# Patient Record
Sex: MALE | Race: WHITE | NOT HISPANIC OR LATINO | Employment: OTHER | URBAN - METROPOLITAN AREA
[De-identification: names, ages, dates, MRNs, and addresses within clinical notes are randomized per-mention and may not be internally consistent; named-entity substitution may affect disease eponyms.]

---

## 2023-02-03 PROBLEM — G89.29 CHRONIC ARTHRALGIAS OF KNEES AND HIPS: Status: ACTIVE | Noted: 2023-02-03

## 2023-02-03 PROBLEM — M25.561 CHRONIC ARTHRALGIAS OF KNEES AND HIPS: Status: ACTIVE | Noted: 2023-02-03

## 2023-02-03 PROBLEM — R79.89 LOW SERUM VITAMIN D: Status: ACTIVE | Noted: 2023-02-03

## 2023-02-03 PROBLEM — M25.551 CHRONIC ARTHRALGIAS OF KNEES AND HIPS: Status: ACTIVE | Noted: 2023-02-03

## 2023-02-03 PROBLEM — I10 ESSENTIAL HYPERTENSION: Status: ACTIVE | Noted: 2023-02-03

## 2023-02-03 PROBLEM — D64.9 ANEMIA: Status: ACTIVE | Noted: 2023-02-03

## 2023-02-03 PROBLEM — M25.552 CHRONIC ARTHRALGIAS OF KNEES AND HIPS: Status: ACTIVE | Noted: 2023-02-03

## 2023-02-03 PROBLEM — E80.6 HYPERBILIRUBINEMIA: Status: ACTIVE | Noted: 2023-02-03

## 2023-02-03 PROBLEM — M25.562 CHRONIC ARTHRALGIAS OF KNEES AND HIPS: Status: ACTIVE | Noted: 2023-02-03

## 2023-02-03 RX ORDER — CHOLECALCIFEROL (VITAMIN D3) 125 MCG
CAPSULE ORAL
COMMUNITY

## 2023-02-03 RX ORDER — LISINOPRIL 10 MG/1
30 TABLET ORAL DAILY
COMMUNITY
Start: 2022-11-01 | End: 2023-08-23 | Stop reason: ALTCHOICE

## 2023-03-17 ENCOUNTER — OFFICE VISIT (OUTPATIENT)
Dept: PRIMARY CARE | Facility: CLINIC | Age: 63
End: 2023-03-17
Payer: COMMERCIAL

## 2023-03-17 VITALS
TEMPERATURE: 97.3 F | HEIGHT: 66 IN | OXYGEN SATURATION: 99 % | RESPIRATION RATE: 16 BRPM | SYSTOLIC BLOOD PRESSURE: 216 MMHG | HEART RATE: 87 BPM | DIASTOLIC BLOOD PRESSURE: 80 MMHG | WEIGHT: 185 LBS | BODY MASS INDEX: 29.73 KG/M2

## 2023-03-17 DIAGNOSIS — E80.6 HYPERBILIRUBINEMIA: ICD-10-CM

## 2023-03-17 DIAGNOSIS — R80.9 ALBUMINURIA: ICD-10-CM

## 2023-03-17 DIAGNOSIS — E78.5 HYPERLIPIDEMIA LDL GOAL <100: ICD-10-CM

## 2023-03-17 DIAGNOSIS — R79.89 LOW SERUM VITAMIN D: ICD-10-CM

## 2023-03-17 DIAGNOSIS — R73.9 HYPERGLYCEMIA: ICD-10-CM

## 2023-03-17 DIAGNOSIS — I10 ESSENTIAL HYPERTENSION: Primary | ICD-10-CM

## 2023-03-17 PROCEDURE — 3077F SYST BP >= 140 MM HG: CPT | Performed by: INTERNAL MEDICINE

## 2023-03-17 PROCEDURE — 3079F DIAST BP 80-89 MM HG: CPT | Performed by: INTERNAL MEDICINE

## 2023-03-17 PROCEDURE — 1036F TOBACCO NON-USER: CPT | Performed by: INTERNAL MEDICINE

## 2023-03-17 PROCEDURE — 99214 OFFICE O/P EST MOD 30 MIN: CPT | Performed by: INTERNAL MEDICINE

## 2023-03-17 RX ORDER — AMLODIPINE BESYLATE 5 MG/1
5 TABLET ORAL DAILY
Qty: 30 TABLET | Refills: 5 | Status: SHIPPED | OUTPATIENT
Start: 2023-03-17 | End: 2023-04-13

## 2023-03-17 SDOH — ECONOMIC STABILITY: FOOD INSECURITY: WITHIN THE PAST 12 MONTHS, YOU WORRIED THAT YOUR FOOD WOULD RUN OUT BEFORE YOU GOT MONEY TO BUY MORE.: NEVER TRUE

## 2023-03-17 SDOH — HEALTH STABILITY: PHYSICAL HEALTH: ON AVERAGE, HOW MANY MINUTES DO YOU ENGAGE IN EXERCISE AT THIS LEVEL?: 90 MIN

## 2023-03-17 SDOH — HEALTH STABILITY: PHYSICAL HEALTH: ON AVERAGE, HOW MANY DAYS PER WEEK DO YOU ENGAGE IN MODERATE TO STRENUOUS EXERCISE (LIKE A BRISK WALK)?: 7 DAYS

## 2023-03-17 SDOH — ECONOMIC STABILITY: FOOD INSECURITY: WITHIN THE PAST 12 MONTHS, THE FOOD YOU BOUGHT JUST DIDN'T LAST AND YOU DIDN'T HAVE MONEY TO GET MORE.: NEVER TRUE

## 2023-03-17 ASSESSMENT — ENCOUNTER SYMPTOMS: HYPERTENSION: 1

## 2023-03-17 ASSESSMENT — PATIENT HEALTH QUESTIONNAIRE - PHQ9
SUM OF ALL RESPONSES TO PHQ9 QUESTIONS 1 & 2: 0
2. FEELING DOWN, DEPRESSED OR HOPELESS: NOT AT ALL
1. LITTLE INTEREST OR PLEASURE IN DOING THINGS: NOT AT ALL

## 2023-03-17 ASSESSMENT — LIFESTYLE VARIABLES
HOW OFTEN DO YOU HAVE A DRINK CONTAINING ALCOHOL: NEVER
HOW OFTEN DO YOU HAVE SIX OR MORE DRINKS ON ONE OCCASION: NEVER
AUDIT-C TOTAL SCORE: 0
HOW MANY STANDARD DRINKS CONTAINING ALCOHOL DO YOU HAVE ON A TYPICAL DAY: PATIENT DOES NOT DRINK
SKIP TO QUESTIONS 9-10: 1

## 2023-03-17 ASSESSMENT — PAIN SCALES - GENERAL: PAINLEVEL: 0-NO PAIN

## 2023-03-17 NOTE — ASSESSMENT & PLAN NOTE
BP remains elevated, he has significant albuminuria, continue the lisinopril, he has no abdominal bruits.  Will add amlodipine to current regimen. Check a renal vascular study and an ECHO cardiogram. Patient does have a family history of HTN.  Recheck BP in the office in 3 to 4 weeks. Recheck labs in a month (3 month follow up).

## 2023-03-17 NOTE — PROGRESS NOTES
Chief Complaint/HPI:  HTN: Patient has been taking lisinopril 30 mg daily since 12/2022, he remains active. He had been eating a high protein diet prior to last labs, he has reduced the amount of protein in the diet now. He otherwise feels well. He avoids added salt in the diet. He does not consume caffeine. He denies any chest discomfort or shortness of breath, he coaches tennis.    Hyperglycemia: patient feels that he was not fasting at the time of the last visit    Vitamin d deficiency: he takes vitamin d daily    ROS otherwise negative aside from what was mentioned above in HPI.      Patient Active Problem List   Diagnosis    Anemia    Chronic arthralgias of knees and hips    Essential hypertension    Hyperbilirubinemia    Low serum vitamin D    Hyperlipidemia LDL goal <100    Albuminuria    Hyperglycemia         Past Medical History:   Diagnosis Date    Anemia 02/03/2023    Chronic arthralgias of knees and hips 02/03/2023    Essential hypertension 02/03/2023    Hyperbilirubinemia 02/03/2023    Hypertension     Low serum vitamin D 02/03/2023     Past Surgical History:   Procedure Laterality Date    OTHER SURGICAL HISTORY  11/24/2021    Tonsillectomy with adenoidectomy    OTHER SURGICAL HISTORY  11/24/2021    [place] tooth extraction     Social History     Social History Narrative    Not on file         ALLERGIES  Patient has no known allergies.      MEDICATIONS  Current Outpatient Medications on File Prior to Visit   Medication Sig Dispense Refill    cholecalciferol (Vitamin D-3) 125 MCG (5000 UT) capsule Vitamin D3 125 MCG (5000 UT) Oral Capsule   Refills: 0       Active      lisinopril 10 mg tablet Take 3 tablets (30 mg) by mouth once daily.      multivit-minerals/FA/lycopene (ONE DAILY FOR MEN ORAL)        No current facility-administered medications on file prior to visit.         PHYSICAL EXAM  BP (!) 216/80 (BP Location: Left arm, Patient Position: Sitting, BP Cuff Size: Large adult)   Pulse 87   Temp  "36.3 °C (97.3 °F) (Temporal)   Resp 16   Ht 1.676 m (5' 6\")   Wt 83.9 kg (185 lb)   SpO2 99%   BMI 29.86 kg/m²   Body mass index is 29.86 kg/m².  Gen: Alert, NAD, wearing a mask  HEENT:  PERRLA, EOMI, conjunctiva and sclera normal in appearance,  neck is supple without masses, bruits or thyromegaly  Respiratory:  Lungs CTAB  Cardiovascular:  Heart RRR. No M/R/G  Neuro:  Gross motor and sensory intact  Abdomen: no abdominal bruits are noted       Component Ref Range & Units 2 mo ago 9 mo ago 1 yr ago   Glucose 74 - 99 mg/dL 113 High   95   Sodium 136 - 145 mmol/L 141  137   Potassium 3.5 - 5.3 mmol/L 4.4  3.9   Chloride 98 - 107 mmol/L 105  101   Bicarbonate 21 - 32 mmol/L 25  25   Anion Gap 10 - 20 mmol/L 15  15   Urea Nitrogen 6 - 23 mg/dL 34 High   28 High    Creatinine 0.50 - 1.30 mg/dL 1.27  1.20   GFR MALE >90 mL/min/1.73m2 64     Comment:  CALCULATIONS OF ESTIMATED GFR ARE PERFORMED   USING THE 2021 CKD-EPI STUDY REFIT EQUATION   WITHOUT THE RACE VARIABLE FOR THE IDMS-TRACEABLE   CREATININE METHODS.    https://jasn.asnjournals.org/content/early/2021/09/22/[email or web address]   Calcium 8.6 - 10.3 mg/dL 9.5  9.4   Albumin 3.4 - 5.0 g/dL 3.9 3.4 4.0   Alkaline Phosphatase 33 - 136 U/L 75 67 62   Total Protein 6.4 - 8.2 g/dL 7.3 6.6 7.5   AST 9 - 39 U/L 38 28 29   Total Bilirubin 0.0 - 1.2 mg/dL 0.6 0.6 1.5 High    ALT (SGPT) 10 - 52 U/L 19 21 CM 19 CM   Comment:  Patients treated with Sulfasalazine may generate     Cholesterol 0 - 199 mg/dL 216 High  154 CM   Comment: .      AGE      DESIRABLE   BORDERLINE HIGH   HIGH     0-19 Y     0 - 169       170 - 199     >/= 200    20-24 Y     0 - 189       190 - 224     >/= 225        >24 Y     0 - 199       200 - 239     >/= 240   **All ranges are based on fasting samples. Specific   therapeutic targets will vary based on patient-specific   cardiac risk.  .   Pediatric guidelines reference:Pediatrics 2011, 128(S5).   Adult guidelines reference: NCEP ATPIII Guidelines,    " LESLEY 2001, 258:2486-97  .   Venipuncture immediately after or during the   administration of Metamizole may lead to falsely   low results. Testing should be performed immediately   prior to Metamizole dosing.   HDL mg/dL 55.9 48.6 CM   Comment: .      AGE      VERY LOW   LOW     NORMAL    HIGH       0-19 Y       < 35   < 40     40-45     ----    20-24 Y       ----   < 40       >45     ----      >24 Y       ----   < 40     40-60      >60  .   Cholesterol/HDL Ratio  3.9 3.2 CM   Comment: REF VALUES  DESIRABLE  < 3.4  HIGH RISK  > 5.0   LDL 0 - 99 mg/dL 143 High  92 CM   Comment: .                           NEAR      BORD      AGE      DESIRABLE  OPTIMAL    HIGH     HIGH     VERY HIGH     0-19 Y     0 - 109     ---    110-129   >/= 130     ----    20-24 Y     0 - 119     ---    120-159   >/= 160     ----      >24 Y     0 -  99   100-129  130-159   160-189     >/=190  .   VLDL 0 - 40 mg/dL 17 14   Triglycerides 0 - 149 mg/dL 85 68 CM   Comment: .      AGE      DESIRABLE   BORDERLINE HIGH   HIGH     VERY HIGH   0 D-90 D    19 - 174         ----         ----        ----  91 D- 9 Y     0 -  74        75 -  99     >/= 100      ----    10-19 Y     0 -  89        90 - 129     >/= 130      ----    20-24 Y     0 - 114       115 - 149     >/= 150      ----        >24 Y     0 - 149       150 - 199    200- 499    >/= 500  .   Venipuncture immediately after or during the   administration of Metamizole may lead to falsely   low results. Testing should be performed immediately   prior to Metamizole dosing.     Component Ref Range & Units 2 mo ago   ALBUMIN (MG/L) IN URINE Not Established mg/L 534.2   Albumin/Creatine Ratio 0.0 - 30.0 ug/mg crt 2,684.4 High    Creatinine, Urine 20.0 - 370.0 mg/dL 19.9 Low        ASSESSMENT/PLAN  Problem List Items Addressed This Visit          Circulatory    Essential hypertension - Primary    Current Assessment & Plan     BP remains elevated, he has significant albuminuria, continue the lisinopril,  he has no abdominal bruits.  Will add amlodipine to current regimen. Check a renal vascular study and an ECHO cardiogram. Patient does have a family history of HTN.  Recheck BP in the office in 3 to 4 weeks. Recheck labs in a month (3 month follow up).            Other    Hyperbilirubinemia    Low serum vitamin D    Current Assessment & Plan     Continue vitamin d and continue to monitor         Hyperlipidemia LDL goal <100    Current Assessment & Plan     Recheck labs in a month, will treat if needed         Albuminuria    Current Assessment & Plan     See above plan for hypertension         Hyperglycemia    Current Assessment & Plan     Check a HgbA1C next month              Federico Katz MD Answers submitted by the patient for this visit:  High Blood Pressure Questionnaire (Submitted on 3/17/2023)  Chief Complaint: Hypertension  Chronicity: chronic  Onset: more than 1 year ago  Progression since onset: waxing and waning  Condition status: resistant  Recheck BP in 3 weeks    Follow up after testing in about one month

## 2023-03-30 DIAGNOSIS — I70.1 RENAL ARTERY STENOSIS, NATIVE, BILATERAL (CMS-HCC): Primary | ICD-10-CM

## 2023-04-08 DIAGNOSIS — R80.9 ALBUMINURIA: ICD-10-CM

## 2023-04-08 DIAGNOSIS — I10 ESSENTIAL HYPERTENSION: ICD-10-CM

## 2023-04-13 RX ORDER — AMLODIPINE BESYLATE 5 MG/1
TABLET ORAL
Qty: 30 TABLET | Refills: 5 | Status: SHIPPED | OUTPATIENT
Start: 2023-04-13 | End: 2023-10-09

## 2023-04-19 NOTE — PROGRESS NOTES
Subjective   Patient ID: Gilberto White is a 62 y.o. male who presents for Hypertension.    HPI   Patient presents for follow up for blood presssure.  Last blood work in 1/2023.    HLD: LDL elevated, 143. Does not currently take medications for this. Has blood work scheduled for 5/8 to reassess lipid panel.    HTN: BP elevated in office, 181/70. At home BP has been similar to office recording. Takes lisinopril 30 mg and Norvasc 5 mg daily. States he has upcoming renal angiography requiring stenting 5/11/2023 with Dr. Wilson which will hopefully help the blood pressure. Mackenzie Katie does not want medication changes until assessment after stenting.   Had recent Echo (4/2023) and revealed Left ventricular systolic function is normal with a 60% estimated ejection fraction. Renal artery US (3/2023) revealed bilateral renal artery stenosis at 60%.    Hyperglycemia: Fasting glucose elevated, 113. Has lost significant weight over the past few years. Has family history of Type 2 DM. Continues to stay physically active.    Vitamin D deficiency: Takes vitamin D daily.    Colon screening: Last colonoscopy 2022.    Review of Systems   Constitutional:  Negative for chills, fatigue, fever and unexpected weight change.   HENT: Negative.     Eyes: Negative.    Respiratory:  Negative for cough and shortness of breath.    Cardiovascular:  Positive for leg swelling (b/l). Negative for chest pain and palpitations.        Bilateral renal artery stenosis noted on renal arterial study, patient will have stent placement per Dr Wilson on 5/11/2023   Gastrointestinal:  Negative for abdominal pain, blood in stool, diarrhea, nausea and vomiting.   Endocrine: Negative.    Genitourinary:  Negative for difficulty urinating, frequency, hematuria and urgency.   Musculoskeletal: Negative.    Skin: Negative.    Neurological:  Negative for dizziness, syncope, weakness, light-headedness and headaches.   Psychiatric/Behavioral: Negative.         Objective  "  BP (!) 181/70 (BP Location: Right arm, BP Cuff Size: Adult)   Pulse 77   Ht 1.734 m (5' 8.27\")   Wt 86.6 kg (191 lb)   SpO2 98%   BMI 28.81 kg/m²     Physical Exam  Vitals reviewed.   Constitutional:       Appearance: Normal appearance. He is not ill-appearing.   HENT:      Head: Normocephalic and atraumatic.      Right Ear: External ear normal.      Left Ear: External ear normal.   Eyes:      Extraocular Movements: Extraocular movements intact.      Conjunctiva/sclera: Conjunctivae normal.   Neck:      Vascular: Carotid bruit present.      Comments: Soft bilateral bruits are noted  Cardiovascular:      Rate and Rhythm: Normal rate and regular rhythm.      Pulses: Normal pulses.      Heart sounds: Murmur heard.      Comments: A soft SM is noted over the LUSB  Pulmonary:      Effort: Pulmonary effort is normal.      Breath sounds: Normal breath sounds.   Abdominal:      General: There is no distension.      Comments: No abdominal bruits are noted   Musculoskeletal:         General: Swelling (b/l below knee to ankle) present. No tenderness. Normal range of motion.      Cervical back: Normal range of motion and neck supple.      Right lower leg: Edema present.      Left lower leg: Edema present.   Skin:     General: Skin is warm and dry.      Findings: No rash (on exposed skin).   Neurological:      General: No focal deficit present.      Mental Status: He is alert and oriented to person, place, and time. Mental status is at baseline.      Motor: No weakness.   Psychiatric:         Mood and Affect: Mood normal.         Behavior: Behavior normal.         Thought Content: Thought content normal.         Judgment: Judgment normal.         Assessment/Plan   Problem List Items Addressed This Visit          Circulatory    Essential hypertension - Primary     Continue meds. No changes. Patient has bilateral renal artery stenosis, stenting procedure is scheduled. Will reassess after renal stenting per Dr. Wilson's " recommendations. Lisinopril is not improving renal function at this time         Relevant Orders    Comprehensive metabolic panel    CBC and Auto Differential    Bilateral renal artery stenosis (CMS/HCC)    Relevant Orders    Comprehensive metabolic panel    CBC and Auto Differential    Albumin, urine, random    Vascular US carotid artery duplex bilateral       Other    Low serum vitamin D     Continue taking vitamin D, check labs as ordered         Relevant Orders    Comprehensive metabolic panel    CBC and Auto Differential    Vitamin D 25-Hydroxy,Total    Hyperlipidemia LDL goal <100     Will reassess after blood work 5/8/23.         Relevant Orders    Comprehensive metabolic panel    CBC and Auto Differential    Lipid panel    Albuminuria     Significant albuminuria noted, likely related to renal issues, stenting is pending         Relevant Orders    Comprehensive metabolic panel    CBC and Auto Differential    Albumin, urine, random    Hyperglycemia     Continue to watch diet. Will assess with A1c.         Relevant Orders    Comprehensive metabolic panel    CBC and Auto Differential    Hemoglobin A1c     Other Visit Diagnoses       Bilateral carotid bruits        Relevant Orders    Vascular US carotid artery duplex bilateral             Follow up here in 4-6 months   Maintain appointments with Dr. Wilson

## 2023-04-20 ENCOUNTER — OFFICE VISIT (OUTPATIENT)
Dept: PRIMARY CARE | Facility: CLINIC | Age: 63
End: 2023-04-20
Payer: COMMERCIAL

## 2023-04-20 VITALS
HEIGHT: 68 IN | DIASTOLIC BLOOD PRESSURE: 70 MMHG | HEART RATE: 77 BPM | OXYGEN SATURATION: 98 % | SYSTOLIC BLOOD PRESSURE: 181 MMHG | BODY MASS INDEX: 28.95 KG/M2 | WEIGHT: 191 LBS

## 2023-04-20 DIAGNOSIS — I10 ESSENTIAL HYPERTENSION: Primary | ICD-10-CM

## 2023-04-20 DIAGNOSIS — R73.9 HYPERGLYCEMIA: ICD-10-CM

## 2023-04-20 DIAGNOSIS — E78.5 HYPERLIPIDEMIA LDL GOAL <100: ICD-10-CM

## 2023-04-20 DIAGNOSIS — R09.89 BILATERAL CAROTID BRUITS: ICD-10-CM

## 2023-04-20 DIAGNOSIS — I70.1 BILATERAL RENAL ARTERY STENOSIS (CMS-HCC): ICD-10-CM

## 2023-04-20 DIAGNOSIS — R79.89 LOW SERUM VITAMIN D: ICD-10-CM

## 2023-04-20 DIAGNOSIS — R80.9 ALBUMINURIA: ICD-10-CM

## 2023-04-20 PROCEDURE — 3077F SYST BP >= 140 MM HG: CPT | Performed by: INTERNAL MEDICINE

## 2023-04-20 PROCEDURE — 99214 OFFICE O/P EST MOD 30 MIN: CPT | Performed by: INTERNAL MEDICINE

## 2023-04-20 PROCEDURE — 3078F DIAST BP <80 MM HG: CPT | Performed by: INTERNAL MEDICINE

## 2023-04-20 PROCEDURE — 1036F TOBACCO NON-USER: CPT | Performed by: INTERNAL MEDICINE

## 2023-04-20 ASSESSMENT — ENCOUNTER SYMPTOMS
SHORTNESS OF BREATH: 0
LIGHT-HEADEDNESS: 0
WEAKNESS: 0
ENDOCRINE NEGATIVE: 1
EYES NEGATIVE: 1
MUSCULOSKELETAL NEGATIVE: 1
PSYCHIATRIC NEGATIVE: 1
VOMITING: 0
DIARRHEA: 0
COUGH: 0
FREQUENCY: 0
CHILLS: 0
HEMATURIA: 0
BLOOD IN STOOL: 0
DIZZINESS: 0
HEADACHES: 0
ABDOMINAL PAIN: 0
FEVER: 0
NAUSEA: 0
FATIGUE: 0
PALPITATIONS: 0
UNEXPECTED WEIGHT CHANGE: 0
DIFFICULTY URINATING: 0

## 2023-04-20 ASSESSMENT — PATIENT HEALTH QUESTIONNAIRE - PHQ9
2. FEELING DOWN, DEPRESSED OR HOPELESS: NOT AT ALL
1. LITTLE INTEREST OR PLEASURE IN DOING THINGS: NOT AT ALL
SUM OF ALL RESPONSES TO PHQ9 QUESTIONS 1 & 2: 0

## 2023-04-20 NOTE — ASSESSMENT & PLAN NOTE
Continue meds. No changes. Patient has bilateral renal artery stenosis, stenting procedure is scheduled. Will reassess after renal stenting per Dr. Wilson's recommendations. Lisinopril is not improving renal function at this time

## 2023-05-08 ENCOUNTER — LAB (OUTPATIENT)
Dept: LAB | Facility: LAB | Age: 63
End: 2023-05-08
Payer: COMMERCIAL

## 2023-05-08 DIAGNOSIS — I10 ESSENTIAL HYPERTENSION: ICD-10-CM

## 2023-05-08 DIAGNOSIS — R79.89 LOW SERUM VITAMIN D: ICD-10-CM

## 2023-05-08 DIAGNOSIS — E78.5 HYPERLIPIDEMIA LDL GOAL <100: ICD-10-CM

## 2023-05-08 DIAGNOSIS — R80.9 ALBUMINURIA: ICD-10-CM

## 2023-05-08 DIAGNOSIS — I70.1 BILATERAL RENAL ARTERY STENOSIS (CMS-HCC): ICD-10-CM

## 2023-05-08 DIAGNOSIS — R73.9 HYPERGLYCEMIA: ICD-10-CM

## 2023-05-08 LAB
ALANINE AMINOTRANSFERASE (SGPT) (U/L) IN SER/PLAS: 32 U/L (ref 10–52)
ALBUMIN (G/DL) IN SER/PLAS: 3.8 G/DL (ref 3.4–5)
ALBUMIN (MG/L) IN URINE: 202.2 MG/L
ALBUMIN/CREATININE (UG/MG) IN URINE: 646 UG/MG CRT (ref 0–30)
ALKALINE PHOSPHATASE (U/L) IN SER/PLAS: 84 U/L (ref 33–136)
ANION GAP IN SER/PLAS: 14 MMOL/L (ref 10–20)
ASPARTATE AMINOTRANSFERASE (SGOT) (U/L) IN SER/PLAS: 42 U/L (ref 9–39)
BASOPHILS (10*3/UL) IN BLOOD BY AUTOMATED COUNT: 0.07 X10E9/L (ref 0–0.1)
BASOPHILS/100 LEUKOCYTES IN BLOOD BY AUTOMATED COUNT: 1.3 % (ref 0–2)
BILIRUBIN TOTAL (MG/DL) IN SER/PLAS: 1.2 MG/DL (ref 0–1.2)
CALCIDIOL (25 OH VITAMIN D3) (NG/ML) IN SER/PLAS: 65 NG/ML
CALCIUM (MG/DL) IN SER/PLAS: 8.7 MG/DL (ref 8.6–10.3)
CARBON DIOXIDE, TOTAL (MMOL/L) IN SER/PLAS: 17 MMOL/L (ref 21–32)
CHLORIDE (MMOL/L) IN SER/PLAS: 106 MMOL/L (ref 98–107)
CHOLESTEROL (MG/DL) IN SER/PLAS: 171 MG/DL (ref 0–199)
CHOLESTEROL IN HDL (MG/DL) IN SER/PLAS: 56.5 MG/DL
CHOLESTEROL/HDL RATIO: 3
CREATININE (MG/DL) IN SER/PLAS: 1.38 MG/DL (ref 0.5–1.3)
CREATININE (MG/DL) IN URINE: 31.3 MG/DL (ref 20–370)
EOSINOPHILS (10*3/UL) IN BLOOD BY AUTOMATED COUNT: 0.22 X10E9/L (ref 0–0.7)
EOSINOPHILS/100 LEUKOCYTES IN BLOOD BY AUTOMATED COUNT: 4 % (ref 0–6)
ERYTHROCYTE DISTRIBUTION WIDTH (RATIO) BY AUTOMATED COUNT: 12.6 % (ref 11.5–14.5)
ERYTHROCYTE MEAN CORPUSCULAR HEMOGLOBIN CONCENTRATION (G/DL) BY AUTOMATED: 32.1 G/DL (ref 32–36)
ERYTHROCYTE MEAN CORPUSCULAR VOLUME (FL) BY AUTOMATED COUNT: 103 FL (ref 80–100)
ERYTHROCYTES (10*6/UL) IN BLOOD BY AUTOMATED COUNT: 3.37 X10E12/L (ref 4.5–5.9)
ESTIMATED AVERAGE GLUCOSE FOR HBA1C: 126 MG/DL
GFR MALE: 58 ML/MIN/1.73M2
GLUCOSE (MG/DL) IN SER/PLAS: 72 MG/DL (ref 74–99)
HEMATOCRIT (%) IN BLOOD BY AUTOMATED COUNT: 34.6 % (ref 41–52)
HEMOGLOBIN (G/DL) IN BLOOD: 11.1 G/DL (ref 13.5–17.5)
HEMOGLOBIN A1C/HEMOGLOBIN TOTAL IN BLOOD: 6 %
IMMATURE GRANULOCYTES/100 LEUKOCYTES IN BLOOD BY AUTOMATED COUNT: 0.4 % (ref 0–0.9)
LDL: 104 MG/DL (ref 0–99)
LEUKOCYTES (10*3/UL) IN BLOOD BY AUTOMATED COUNT: 5.5 X10E9/L (ref 4.4–11.3)
LYMPHOCYTES (10*3/UL) IN BLOOD BY AUTOMATED COUNT: 1.29 X10E9/L (ref 1.2–4.8)
LYMPHOCYTES/100 LEUKOCYTES IN BLOOD BY AUTOMATED COUNT: 23.5 % (ref 13–44)
MONOCYTES (10*3/UL) IN BLOOD BY AUTOMATED COUNT: 0.48 X10E9/L (ref 0.1–1)
MONOCYTES/100 LEUKOCYTES IN BLOOD BY AUTOMATED COUNT: 8.7 % (ref 2–10)
NEUTROPHILS (10*3/UL) IN BLOOD BY AUTOMATED COUNT: 3.41 X10E9/L (ref 1.2–7.7)
NEUTROPHILS/100 LEUKOCYTES IN BLOOD BY AUTOMATED COUNT: 62.1 % (ref 40–80)
PLATELETS (10*3/UL) IN BLOOD AUTOMATED COUNT: 180 X10E9/L (ref 150–450)
POTASSIUM (MMOL/L) IN SER/PLAS: 5.5 MMOL/L (ref 3.5–5.3)
PROTEIN TOTAL: 6.6 G/DL (ref 6.4–8.2)
SODIUM (MMOL/L) IN SER/PLAS: 131 MMOL/L (ref 136–145)
TRIGLYCERIDE (MG/DL) IN SER/PLAS: 51 MG/DL (ref 0–149)
UREA NITROGEN (MG/DL) IN SER/PLAS: 56 MG/DL (ref 6–23)
VLDL: 10 MG/DL (ref 0–40)

## 2023-05-08 PROCEDURE — 82043 UR ALBUMIN QUANTITATIVE: CPT

## 2023-05-08 PROCEDURE — 80053 COMPREHEN METABOLIC PANEL: CPT

## 2023-05-08 PROCEDURE — 82306 VITAMIN D 25 HYDROXY: CPT

## 2023-05-08 PROCEDURE — 82570 ASSAY OF URINE CREATININE: CPT

## 2023-05-08 PROCEDURE — 36415 COLL VENOUS BLD VENIPUNCTURE: CPT

## 2023-05-08 PROCEDURE — 80061 LIPID PANEL: CPT

## 2023-05-08 PROCEDURE — 85025 COMPLETE CBC W/AUTO DIFF WBC: CPT

## 2023-05-08 PROCEDURE — 83036 HEMOGLOBIN GLYCOSYLATED A1C: CPT

## 2023-05-09 LAB — SARS-COV-2 RESULT: NOT DETECTED

## 2023-05-11 ENCOUNTER — HOSPITAL ENCOUNTER (OUTPATIENT)
Dept: DATA CONVERSION | Facility: HOSPITAL | Age: 63
End: 2023-05-11
Attending: INTERNAL MEDICINE | Admitting: INTERNAL MEDICINE
Payer: COMMERCIAL

## 2023-05-11 DIAGNOSIS — Z90.49 ACQUIRED ABSENCE OF OTHER SPECIFIED PARTS OF DIGESTIVE TRACT: ICD-10-CM

## 2023-05-11 DIAGNOSIS — Z90.89 ACQUIRED ABSENCE OF OTHER ORGANS: ICD-10-CM

## 2023-05-11 DIAGNOSIS — E80.6 OTHER DISORDERS OF BILIRUBIN METABOLISM: ICD-10-CM

## 2023-05-11 DIAGNOSIS — G89.29 OTHER CHRONIC PAIN: ICD-10-CM

## 2023-05-11 DIAGNOSIS — I73.9 PERIPHERAL VASCULAR DISEASE, UNSPECIFIED (CMS-HCC): ICD-10-CM

## 2023-05-11 DIAGNOSIS — I10 ESSENTIAL (PRIMARY) HYPERTENSION: ICD-10-CM

## 2023-05-11 DIAGNOSIS — I70.1 ATHEROSCLEROSIS OF RENAL ARTERY (CMS-HCC): ICD-10-CM

## 2023-05-11 DIAGNOSIS — D64.9 ANEMIA, UNSPECIFIED: ICD-10-CM

## 2023-05-12 LAB
ANION GAP IN SER/PLAS: 11 MMOL/L (ref 10–20)
CALCIUM (MG/DL) IN SER/PLAS: 8.7 MG/DL (ref 8.6–10.3)
CARBON DIOXIDE, TOTAL (MMOL/L) IN SER/PLAS: 19 MMOL/L (ref 21–32)
CHLORIDE (MMOL/L) IN SER/PLAS: 115 MMOL/L (ref 98–107)
CREATININE (MG/DL) IN SER/PLAS: 1.49 MG/DL (ref 0.5–1.3)
ERYTHROCYTE DISTRIBUTION WIDTH (RATIO) BY AUTOMATED COUNT: 13.1 % (ref 11.5–14.5)
ERYTHROCYTE MEAN CORPUSCULAR HEMOGLOBIN CONCENTRATION (G/DL) BY AUTOMATED: 32.6 G/DL (ref 32–36)
ERYTHROCYTE MEAN CORPUSCULAR VOLUME (FL) BY AUTOMATED COUNT: 101 FL (ref 80–100)
ERYTHROCYTES (10*6/UL) IN BLOOD BY AUTOMATED COUNT: 2.76 X10E12/L (ref 4.5–5.9)
GFR MALE: 53 ML/MIN/1.73M2
GLUCOSE (MG/DL) IN SER/PLAS: 126 MG/DL (ref 74–99)
HEMATOCRIT (%) IN BLOOD BY AUTOMATED COUNT: 27.9 % (ref 41–52)
HEMOGLOBIN (G/DL) IN BLOOD: 9.1 G/DL (ref 13.5–17.5)
LEUKOCYTES (10*3/UL) IN BLOOD BY AUTOMATED COUNT: 8.4 X10E9/L (ref 4.4–11.3)
PLATELETS (10*3/UL) IN BLOOD AUTOMATED COUNT: 187 X10E9/L (ref 150–450)
POTASSIUM (MMOL/L) IN SER/PLAS: 5.8 MMOL/L (ref 3.5–5.3)
SODIUM (MMOL/L) IN SER/PLAS: 139 MMOL/L (ref 136–145)
UREA NITROGEN (MG/DL) IN SER/PLAS: 48 MG/DL (ref 6–23)

## 2023-08-23 ENCOUNTER — OFFICE VISIT (OUTPATIENT)
Dept: PRIMARY CARE | Facility: CLINIC | Age: 63
End: 2023-08-23
Payer: COMMERCIAL

## 2023-08-23 VITALS
DIASTOLIC BLOOD PRESSURE: 73 MMHG | HEART RATE: 74 BPM | SYSTOLIC BLOOD PRESSURE: 136 MMHG | OXYGEN SATURATION: 99 % | BODY MASS INDEX: 28.59 KG/M2 | RESPIRATION RATE: 16 BRPM | WEIGHT: 188 LBS | TEMPERATURE: 96.7 F

## 2023-08-23 DIAGNOSIS — I70.1 BILATERAL RENAL ARTERY STENOSIS (CMS-HCC): ICD-10-CM

## 2023-08-23 DIAGNOSIS — R73.9 HYPERGLYCEMIA: ICD-10-CM

## 2023-08-23 DIAGNOSIS — D64.9 ANEMIA, UNSPECIFIED TYPE: ICD-10-CM

## 2023-08-23 DIAGNOSIS — I10 ESSENTIAL HYPERTENSION: Primary | ICD-10-CM

## 2023-08-23 DIAGNOSIS — R79.89 LOW SERUM VITAMIN D: ICD-10-CM

## 2023-08-23 DIAGNOSIS — E78.5 HYPERLIPIDEMIA LDL GOAL <100: ICD-10-CM

## 2023-08-23 DIAGNOSIS — Z00.00 HEALTHCARE MAINTENANCE: ICD-10-CM

## 2023-08-23 DIAGNOSIS — N18.31 STAGE 3A CHRONIC KIDNEY DISEASE (MULTI): ICD-10-CM

## 2023-08-23 PROBLEM — I72.3: Status: ACTIVE | Noted: 2023-08-23

## 2023-08-23 PROBLEM — N18.9 CKD (CHRONIC KIDNEY DISEASE): Status: ACTIVE | Noted: 2023-08-23

## 2023-08-23 PROCEDURE — 3075F SYST BP GE 130 - 139MM HG: CPT | Performed by: INTERNAL MEDICINE

## 2023-08-23 PROCEDURE — 3078F DIAST BP <80 MM HG: CPT | Performed by: INTERNAL MEDICINE

## 2023-08-23 PROCEDURE — 1036F TOBACCO NON-USER: CPT | Performed by: INTERNAL MEDICINE

## 2023-08-23 PROCEDURE — 99214 OFFICE O/P EST MOD 30 MIN: CPT | Performed by: INTERNAL MEDICINE

## 2023-08-23 RX ORDER — CHLORTHALIDONE 25 MG/1
25 TABLET ORAL DAILY
COMMUNITY
Start: 2023-08-01

## 2023-08-23 RX ORDER — HYDRALAZINE HYDROCHLORIDE 25 MG/1
25 TABLET, FILM COATED ORAL 2 TIMES DAILY
COMMUNITY
End: 2024-02-26 | Stop reason: WASHOUT

## 2023-08-23 RX ORDER — LISINOPRIL 20 MG/1
20 TABLET ORAL DAILY
COMMUNITY
Start: 2023-08-01 | End: 2024-02-26 | Stop reason: WASHOUT

## 2023-08-23 RX ORDER — METOPROLOL SUCCINATE 25 MG/1
25 TABLET, EXTENDED RELEASE ORAL DAILY
COMMUNITY

## 2023-08-23 RX ORDER — HYDRALAZINE HYDROCHLORIDE 25 MG/1
25 TABLET, FILM COATED ORAL EVERY 12 HOURS
COMMUNITY
End: 2023-08-23 | Stop reason: ALTCHOICE

## 2023-08-23 SDOH — ECONOMIC STABILITY: FOOD INSECURITY: WITHIN THE PAST 12 MONTHS, THE FOOD YOU BOUGHT JUST DIDN'T LAST AND YOU DIDN'T HAVE MONEY TO GET MORE.: NEVER TRUE

## 2023-08-23 SDOH — ECONOMIC STABILITY: FOOD INSECURITY: WITHIN THE PAST 12 MONTHS, YOU WORRIED THAT YOUR FOOD WOULD RUN OUT BEFORE YOU GOT MONEY TO BUY MORE.: NEVER TRUE

## 2023-08-23 ASSESSMENT — LIFESTYLE VARIABLES
AUDIT-C TOTAL SCORE: 0
HOW OFTEN DO YOU HAVE SIX OR MORE DRINKS ON ONE OCCASION: NEVER
HOW MANY STANDARD DRINKS CONTAINING ALCOHOL DO YOU HAVE ON A TYPICAL DAY: PATIENT DOES NOT DRINK
HOW OFTEN DO YOU HAVE A DRINK CONTAINING ALCOHOL: NEVER
SKIP TO QUESTIONS 9-10: 1

## 2023-08-23 ASSESSMENT — PATIENT HEALTH QUESTIONNAIRE - PHQ9
2. FEELING DOWN, DEPRESSED OR HOPELESS: NOT AT ALL
SUM OF ALL RESPONSES TO PHQ9 QUESTIONS 1 & 2: 0
1. LITTLE INTEREST OR PLEASURE IN DOING THINGS: NOT AT ALL

## 2023-08-23 ASSESSMENT — PAIN SCALES - GENERAL: PAINLEVEL: 0-NO PAIN

## 2023-08-23 NOTE — PROGRESS NOTES
Chief Complaint/HPI:    hyperlipidemia:      HTN: BP elevated in office, Patient had evaluation by Dr Wilson. He did not require renal artery stenting. Lisinopril initially was stopped, he has been resumed on lisinopril and chlorthalidone per nephrology. Patient currently takes amlodipine, metoprolol, chlorthalidone, hydralazine and lisinopril for BP. Patient now sees Dr Fink for nephrology.  Had recent Echo (4/2023) and revealed Left ventricular systolic function is normal with a 60% estimated ejection fraction. Renal artery US (3/2023) revealed bilateral renal artery stenosis at 60%, the patient apparently did not have significant renal artery stenosis on cath procedure. He did not require stenting. Patient does get lightheaded at times, especially when standing.  Patient has some residual hip pain post cath procedure     Hyperglycemia:  most recent glucose was 126, it was not fasting however. Patient has had good fasting glucoses in the recent past     Vitamin D deficiency: Takes vitamin D daily.     Colon screening: Last colonoscopy 2022.    ROS otherwise negative aside from what was mentioned above in HPI.      Patient Active Problem List   Diagnosis    Anemia    Chronic arthralgias of knees and hips    Essential hypertension    Hyperbilirubinemia    Low serum vitamin D    Hyperlipidemia LDL goal <100    Albuminuria    Hyperglycemia    Bilateral renal artery stenosis (CMS/HCC)    CKD (chronic kidney disease)    Pseudoaneurysm of iliac artery (CMS/HCC)    Renal artery stenosis (CMS/HCC)         Past Medical History:   Diagnosis Date    Anemia 02/03/2023    Chronic arthralgias of knees and hips 02/03/2023    Essential hypertension 02/03/2023    Hyperbilirubinemia 02/03/2023    Hypertension     Low serum vitamin D 02/03/2023     Past Surgical History:   Procedure Laterality Date    OTHER SURGICAL HISTORY  11/24/2021    Tonsillectomy with adenoidectomy    OTHER SURGICAL HISTORY  11/24/2021    [place] tooth  extraction     Social History     Social History Narrative    Not on file         ALLERGIES  Patient has no known allergies.      MEDICATIONS  Current Outpatient Medications on File Prior to Visit   Medication Sig Dispense Refill    amLODIPine (Norvasc) 5 mg tablet TAKE 1 TABLET BY MOUTH EVERY DAY 30 tablet 5    chlorthalidone (Hygroton) 25 mg tablet Take 1 tablet (25 mg) by mouth once daily.      cholecalciferol (Vitamin D-3) 125 MCG (5000 UT) capsule Vitamin D3 125 MCG (5000 UT) Oral Capsule   Refills: 0       Active      lisinopril 20 mg tablet Take 1 tablet (20 mg) by mouth once daily.      metoprolol succinate XL (Toprol-XL) 25 mg 24 hr tablet Take 1 tablet (25 mg) by mouth once daily.      multivit-minerals/FA/lycopene (ONE DAILY FOR MEN ORAL)       hydrALAZINE (Apresoline) 25 mg tablet Take 1 tablet (25 mg) by mouth 2 times a day.      [DISCONTINUED] hydrALAZINE (Apresoline) 25 mg tablet Take 1 tablet (25 mg) by mouth every 12 hours.      [DISCONTINUED] lisinopril 10 mg tablet Take 3 tablets (30 mg) by mouth once daily.       No current facility-administered medications on file prior to visit.         PHYSICAL EXAM  /73 (BP Location: Left arm, Patient Position: Sitting, BP Cuff Size: Adult)   Pulse 74   Temp 35.9 °C (96.7 °F) (Temporal)   Resp 16   Wt 85.3 kg (188 lb)   SpO2 99%   BMI 28.59 kg/m²   Body mass index is 28.59 kg/m².  Constitutional:       Appearance: Normal appearance. He is not ill-appearing.   HENT:      Head: Normocephalic and atraumatic.      Right Ear: External ear normal.      Left Ear: External ear normal.   Eyes:      Extraocular Movements: Extraocular movements intact.      Conjunctiva/sclera: Conjunctivae normal.   Neck:      Vascular: Carotid bruit present.      Comments: Soft bilateral bruits are noted, unchanged  Cardiovascular:      Rate and Rhythm: Normal rate and regular rhythm.      Pulses: Normal pulses.      Heart sounds: Murmur heard.      Comments: A soft SM is  noted over the LUSB, unchanged  Pulmonary:      Effort: Pulmonary effort is normal.      Breath sounds: Normal breath sounds.   Musculoskeletal:         General: Swelling (b/l below knee to ankle) present. No tenderness. Normal range of motion.      Cervical back: Normal range of motion and neck supple.      Right lower leg: Edema present, he has 1 + pitting ankle edema bilaterally.      Left lower leg: Edema present.   Skin:     General: Skin is warm and dry.      Findings: a few pale patches on crown of scalp, no significant pigmented lesions (post sunburn).   Neurological:      General: No focal deficit present.      Mental Status: He is alert and oriented to person, place, and time. Mental status is at baseline.      Motor: No weakness.   Psychiatric:         Mood and Affect: Mood normal.         Behavior: Behavior normal.         Thought Content: Thought content normal.         Judgment: Judgment normal.     ASSESSMENT/PLAN  Problem List Items Addressed This Visit       Anemia    Current Assessment & Plan     Possibly due to blood loss post cath procedure, recheck the CBC         Relevant Orders    CBC and Auto Differential    Comprehensive metabolic panel    Essential hypertension - Primary    Current Assessment & Plan     Controlled on current med therapy, sees nephrology         Relevant Orders    Comprehensive metabolic panel    Albumin, urine, random    Low serum vitamin D    Current Assessment & Plan     Recheck labs as ordered         Relevant Orders    Comprehensive metabolic panel    Vitamin D 25-Hydroxy,Total    Hyperlipidemia LDL goal <100    Current Assessment & Plan     Fair control, check labs as ordered         Relevant Orders    Lipid panel    Comprehensive metabolic panel    Hyperglycemia    Current Assessment & Plan     Glucoses have been stable, recheck HgbA1C         Relevant Orders    Comprehensive metabolic panel    Hemoglobin A1c    Bilateral renal artery stenosis (CMS/HCC)    Current  Assessment & Plan     No stenting required, is managing BP         Relevant Orders    CBC and Auto Differential    Comprehensive metabolic panel    Albumin, urine, random    CKD (chronic kidney disease)    Current Assessment & Plan     Stable, sees nephrology now, takes multiple meds for BP         Relevant Orders    Comprehensive metabolic panel     Other Visit Diagnoses       Healthcare maintenance        Relevant Orders    Hepatitis C antibody          Recheck labs as ordered, may follow up in 6 months    Federico Katz MD

## 2023-09-05 ENCOUNTER — LAB (OUTPATIENT)
Dept: LAB | Facility: LAB | Age: 63
End: 2023-09-05
Payer: COMMERCIAL

## 2023-09-05 DIAGNOSIS — D64.9 ANEMIA, UNSPECIFIED TYPE: ICD-10-CM

## 2023-09-05 DIAGNOSIS — R79.89 LOW SERUM VITAMIN D: ICD-10-CM

## 2023-09-05 DIAGNOSIS — I10 ESSENTIAL HYPERTENSION: ICD-10-CM

## 2023-09-05 DIAGNOSIS — N18.31 STAGE 3A CHRONIC KIDNEY DISEASE (MULTI): ICD-10-CM

## 2023-09-05 DIAGNOSIS — R73.9 HYPERGLYCEMIA: ICD-10-CM

## 2023-09-05 DIAGNOSIS — I70.1 BILATERAL RENAL ARTERY STENOSIS (CMS-HCC): ICD-10-CM

## 2023-09-05 DIAGNOSIS — Z00.00 HEALTHCARE MAINTENANCE: ICD-10-CM

## 2023-09-05 DIAGNOSIS — E78.5 HYPERLIPIDEMIA LDL GOAL <100: ICD-10-CM

## 2023-09-05 LAB
ALANINE AMINOTRANSFERASE (SGPT) (U/L) IN SER/PLAS: 36 U/L (ref 10–52)
ALBUMIN (G/DL) IN SER/PLAS: 3.9 G/DL (ref 3.4–5)
ALBUMIN (MG/L) IN URINE: 117.9 MG/L
ALBUMIN/CREATININE (UG/MG) IN URINE: 193.3 UG/MG CRT (ref 0–30)
ALKALINE PHOSPHATASE (U/L) IN SER/PLAS: 79 U/L (ref 33–136)
ANION GAP IN SER/PLAS: 15 MMOL/L (ref 10–20)
ASPARTATE AMINOTRANSFERASE (SGOT) (U/L) IN SER/PLAS: 52 U/L (ref 9–39)
BASOPHILS (10*3/UL) IN BLOOD BY AUTOMATED COUNT: 0.05 X10E9/L (ref 0–0.1)
BASOPHILS/100 LEUKOCYTES IN BLOOD BY AUTOMATED COUNT: 1 % (ref 0–2)
BILIRUBIN TOTAL (MG/DL) IN SER/PLAS: 1.2 MG/DL (ref 0–1.2)
CALCIDIOL (25 OH VITAMIN D3) (NG/ML) IN SER/PLAS: 55 NG/ML
CALCIUM (MG/DL) IN SER/PLAS: 9.1 MG/DL (ref 8.6–10.3)
CARBON DIOXIDE, TOTAL (MMOL/L) IN SER/PLAS: 22 MMOL/L (ref 21–32)
CHLORIDE (MMOL/L) IN SER/PLAS: 99 MMOL/L (ref 98–107)
CHOLESTEROL (MG/DL) IN SER/PLAS: 169 MG/DL (ref 0–199)
CHOLESTEROL IN HDL (MG/DL) IN SER/PLAS: 35.6 MG/DL
CHOLESTEROL/HDL RATIO: 4.7
CREATININE (MG/DL) IN SER/PLAS: 1.84 MG/DL (ref 0.5–1.3)
CREATININE (MG/DL) IN URINE: 61 MG/DL (ref 20–370)
EOSINOPHILS (10*3/UL) IN BLOOD BY AUTOMATED COUNT: 0.29 X10E9/L (ref 0–0.7)
EOSINOPHILS/100 LEUKOCYTES IN BLOOD BY AUTOMATED COUNT: 5.8 % (ref 0–6)
ERYTHROCYTE DISTRIBUTION WIDTH (RATIO) BY AUTOMATED COUNT: 12 % (ref 11.5–14.5)
ERYTHROCYTE MEAN CORPUSCULAR HEMOGLOBIN CONCENTRATION (G/DL) BY AUTOMATED: 33.9 G/DL (ref 32–36)
ERYTHROCYTE MEAN CORPUSCULAR VOLUME (FL) BY AUTOMATED COUNT: 92 FL (ref 80–100)
ERYTHROCYTES (10*6/UL) IN BLOOD BY AUTOMATED COUNT: 3.46 X10E12/L (ref 4.5–5.9)
ESTIMATED AVERAGE GLUCOSE FOR HBA1C: 143 MG/DL
GFR MALE: 41 ML/MIN/1.73M2
GLUCOSE (MG/DL) IN SER/PLAS: 84 MG/DL (ref 74–99)
HEMATOCRIT (%) IN BLOOD BY AUTOMATED COUNT: 31.9 % (ref 41–52)
HEMOGLOBIN (G/DL) IN BLOOD: 10.8 G/DL (ref 13.5–17.5)
HEMOGLOBIN A1C/HEMOGLOBIN TOTAL IN BLOOD: 6.6 %
HEPATITIS C VIRUS AB PRESENCE IN SERUM: NONREACTIVE
IMMATURE GRANULOCYTES/100 LEUKOCYTES IN BLOOD BY AUTOMATED COUNT: 0.2 % (ref 0–0.9)
LDL: 111 MG/DL (ref 0–99)
LEUKOCYTES (10*3/UL) IN BLOOD BY AUTOMATED COUNT: 5 X10E9/L (ref 4.4–11.3)
LYMPHOCYTES (10*3/UL) IN BLOOD BY AUTOMATED COUNT: 1.49 X10E9/L (ref 1.2–4.8)
LYMPHOCYTES/100 LEUKOCYTES IN BLOOD BY AUTOMATED COUNT: 30 % (ref 13–44)
MONOCYTES (10*3/UL) IN BLOOD BY AUTOMATED COUNT: 0.65 X10E9/L (ref 0.1–1)
MONOCYTES/100 LEUKOCYTES IN BLOOD BY AUTOMATED COUNT: 13.1 % (ref 2–10)
NEUTROPHILS (10*3/UL) IN BLOOD BY AUTOMATED COUNT: 2.47 X10E9/L (ref 1.2–7.7)
NEUTROPHILS/100 LEUKOCYTES IN BLOOD BY AUTOMATED COUNT: 49.9 % (ref 40–80)
PLATELETS (10*3/UL) IN BLOOD AUTOMATED COUNT: 179 X10E9/L (ref 150–450)
POTASSIUM (MMOL/L) IN SER/PLAS: 4.9 MMOL/L (ref 3.5–5.3)
PROTEIN TOTAL: 6.6 G/DL (ref 6.4–8.2)
SODIUM (MMOL/L) IN SER/PLAS: 131 MMOL/L (ref 136–145)
TRIGLYCERIDE (MG/DL) IN SER/PLAS: 114 MG/DL (ref 0–149)
UREA NITROGEN (MG/DL) IN SER/PLAS: 59 MG/DL (ref 6–23)
VLDL: 23 MG/DL (ref 0–40)

## 2023-09-05 PROCEDURE — 85025 COMPLETE CBC W/AUTO DIFF WBC: CPT

## 2023-09-05 PROCEDURE — 36415 COLL VENOUS BLD VENIPUNCTURE: CPT

## 2023-09-05 PROCEDURE — 80061 LIPID PANEL: CPT

## 2023-09-05 PROCEDURE — 82043 UR ALBUMIN QUANTITATIVE: CPT

## 2023-09-05 PROCEDURE — 82570 ASSAY OF URINE CREATININE: CPT

## 2023-09-05 PROCEDURE — 83036 HEMOGLOBIN GLYCOSYLATED A1C: CPT

## 2023-09-05 PROCEDURE — 82306 VITAMIN D 25 HYDROXY: CPT

## 2023-09-05 PROCEDURE — 86803 HEPATITIS C AB TEST: CPT

## 2023-09-05 PROCEDURE — 80053 COMPREHEN METABOLIC PANEL: CPT

## 2023-09-14 NOTE — H&P
History & Physical Reviewed:   I have reviewed the History and Physical dated:  13-Apr-2023   History and Physical reviewed and relevant findings noted. Patient examined to review pertinent physical  findings.: No significant changes   Home Medications Reviewed: no changes noted   Allergies Reviewed: no changes noted       Airway/Sedation Assessment:  ·  Emotional Status calm   ·  Neurologic alert & oriented x 3   ·  Respiratory clear to auscultation   ·  Cardiovascular rhythm & rate regular   ·  Mouth Opening OK yes   ·  Neck Flexibility OK yes   ·  Loose Teeth no   ·  Oropharyngeal Classification Class II       ERAS (Enhanced Recovery After Surgery):  ·  ERAS Patient: no     Consent:   COVID-19 Consent:  ·  COVID-19 Risk Consent Surgeon has reviewed key risks related to the risk of felipe COVID-19 and if they contract COVID-19 what the risks are.     Assessment/Plan:   ·  Assessment and Plan    Impression: bilateral renal artery stenosis     Plan: renal angiogram       Electronic Signatures:  Lisa Camarena (APRN-CNP)  (Signed 11-May-2023 11:49)   Authored: History & Physical Reviewed, Airway/Sedation,  ERAS, Consent, Assessment/Plan, Note Completion      Last Updated: 11-May-2023 11:49 by Lisa Camarena (APRN-CNP)

## 2023-10-08 DIAGNOSIS — I10 ESSENTIAL HYPERTENSION: ICD-10-CM

## 2023-10-08 DIAGNOSIS — R80.9 ALBUMINURIA: ICD-10-CM

## 2023-10-09 RX ORDER — AMLODIPINE BESYLATE 5 MG/1
TABLET ORAL
Qty: 90 TABLET | Refills: 1 | Status: SHIPPED | OUTPATIENT
Start: 2023-10-09

## 2023-11-10 ENCOUNTER — LAB (OUTPATIENT)
Dept: LAB | Facility: LAB | Age: 63
End: 2023-11-10
Payer: COMMERCIAL

## 2023-11-13 ENCOUNTER — LAB (OUTPATIENT)
Dept: LAB | Facility: LAB | Age: 63
End: 2023-11-13
Payer: COMMERCIAL

## 2023-11-13 DIAGNOSIS — D64.9 ANEMIA, UNSPECIFIED: ICD-10-CM

## 2023-11-13 DIAGNOSIS — N18.31 CHRONIC KIDNEY DISEASE, STAGE 3A (MULTI): Primary | ICD-10-CM

## 2023-11-13 LAB
ALBUMIN SERPL BCP-MCNC: 4 G/DL (ref 3.4–5)
ANION GAP SERPL CALC-SCNC: 14 MMOL/L (ref 10–20)
APPEARANCE UR: CLEAR
BILIRUB UR STRIP.AUTO-MCNC: NEGATIVE MG/DL
BUN SERPL-MCNC: 65 MG/DL (ref 6–23)
CALCIUM SERPL-MCNC: 8.8 MG/DL (ref 8.6–10.3)
CHLORIDE SERPL-SCNC: 108 MMOL/L (ref 98–107)
CO2 SERPL-SCNC: 20 MMOL/L (ref 21–32)
COLOR UR: ABNORMAL
CREAT SERPL-MCNC: 2.06 MG/DL (ref 0.5–1.3)
CREAT UR-MCNC: 57.4 MG/DL (ref 20–370)
FERRITIN SERPL-MCNC: 775 NG/ML (ref 20–300)
GFR SERPL CREATININE-BSD FRML MDRD: 36 ML/MIN/1.73M*2
GLUCOSE SERPL-MCNC: 95 MG/DL (ref 74–99)
GLUCOSE UR STRIP.AUTO-MCNC: NEGATIVE MG/DL
IRON SATN MFR SERPL: 34 % (ref 25–45)
IRON SERPL-MCNC: 101 UG/DL (ref 35–150)
KETONES UR STRIP.AUTO-MCNC: NEGATIVE MG/DL
LEUKOCYTE ESTERASE UR QL STRIP.AUTO: NEGATIVE
MAGNESIUM SERPL-MCNC: 2.82 MG/DL (ref 1.6–2.4)
NITRITE UR QL STRIP.AUTO: NEGATIVE
PH UR STRIP.AUTO: 5 [PH]
PHOSPHATE SERPL-MCNC: 3.9 MG/DL (ref 2.5–4.9)
POTASSIUM SERPL-SCNC: 5 MMOL/L (ref 3.5–5.3)
PROT UR STRIP.AUTO-MCNC: ABNORMAL MG/DL
PROT UR-ACNC: 18 MG/DL (ref 5–25)
PROT UR-ACNC: 19 MG/DL (ref 5–25)
PROT/CREAT UR: 0.31 MG/MG CREAT (ref 0–0.17)
PTH-INTACT SERPL-MCNC: 44.9 PG/ML (ref 18.5–88)
RBC # UR STRIP.AUTO: ABNORMAL /UL
RBC #/AREA URNS AUTO: NORMAL /HPF
SODIUM SERPL-SCNC: 137 MMOL/L (ref 136–145)
SP GR UR STRIP.AUTO: 1.01
TIBC SERPL-MCNC: 299 UG/DL (ref 240–445)
UIBC SERPL-MCNC: 198 UG/DL (ref 110–370)
UROBILINOGEN UR STRIP.AUTO-MCNC: <2 MG/DL
VIT B12 SERPL-MCNC: 600 PG/ML (ref 211–911)
WBC #/AREA URNS AUTO: NORMAL /HPF

## 2023-11-13 PROCEDURE — 83521 IG LIGHT CHAINS FREE EACH: CPT

## 2023-11-13 PROCEDURE — 83550 IRON BINDING TEST: CPT

## 2023-11-13 PROCEDURE — 36415 COLL VENOUS BLD VENIPUNCTURE: CPT

## 2023-11-13 PROCEDURE — 84166 PROTEIN E-PHORESIS/URINE/CSF: CPT

## 2023-11-13 PROCEDURE — 81001 URINALYSIS AUTO W/SCOPE: CPT

## 2023-11-13 PROCEDURE — 83970 ASSAY OF PARATHORMONE: CPT

## 2023-11-13 PROCEDURE — 83735 ASSAY OF MAGNESIUM: CPT

## 2023-11-13 PROCEDURE — 80069 RENAL FUNCTION PANEL: CPT

## 2023-11-13 PROCEDURE — 82607 VITAMIN B-12: CPT

## 2023-11-13 PROCEDURE — 82570 ASSAY OF URINE CREATININE: CPT

## 2023-11-13 PROCEDURE — 83540 ASSAY OF IRON: CPT

## 2023-11-13 PROCEDURE — 82728 ASSAY OF FERRITIN: CPT

## 2023-11-13 PROCEDURE — 86335 IMMUNFIX E-PHORSIS/URINE/CSF: CPT

## 2023-11-13 PROCEDURE — 84156 ASSAY OF PROTEIN URINE: CPT

## 2023-11-13 PROCEDURE — 86325 OTHER IMMUNOELECTROPHORESIS: CPT

## 2023-11-14 LAB
KAPPA LC SERPL-MCNC: 5.74 MG/DL (ref 0.33–1.94)
KAPPA LC/LAMBDA SER: 1.94 {RATIO} (ref 0.26–1.65)
LAMBDA LC SERPL-MCNC: 2.96 MG/DL (ref 0.57–2.63)

## 2023-11-17 LAB
ALBUMIN MFR UR ELPH: 72.1 %
ALPHA1 GLOB MFR UR ELPH: 2.6 %
ALPHA2 GLOB MFR UR ELPH: 4.4 %
B-GLOBULIN MFR UR ELPH: 10 %
GAMMA GLOB MFR UR ELPH: 10.9 %
IMMUNOFIXATION COMMENT: NORMAL
PATH REVIEW - URINE IMMUNOFIXATION: NORMAL
PATH REVIEW-URINE PROTEIN ELECTROPHORESIS: NORMAL
URINE ELECTROPHORESIS COMMENT: NORMAL

## 2023-11-27 ENCOUNTER — LAB (OUTPATIENT)
Dept: LAB | Facility: LAB | Age: 63
End: 2023-11-27
Payer: COMMERCIAL

## 2023-11-27 DIAGNOSIS — N17.9 ACUTE KIDNEY FAILURE, UNSPECIFIED (CMS-HCC): ICD-10-CM

## 2023-11-27 DIAGNOSIS — N17.9 ACUTE KIDNEY FAILURE, UNSPECIFIED (CMS-HCC): Primary | ICD-10-CM

## 2023-11-27 LAB
ALBUMIN SERPL BCP-MCNC: 4 G/DL (ref 3.4–5)
ANION GAP SERPL CALC-SCNC: 13 MMOL/L (ref 10–20)
BUN SERPL-MCNC: 56 MG/DL (ref 6–23)
CALCIUM SERPL-MCNC: 9.1 MG/DL (ref 8.6–10.3)
CHLORIDE SERPL-SCNC: 112 MMOL/L (ref 98–107)
CO2 SERPL-SCNC: 21 MMOL/L (ref 21–32)
CREAT SERPL-MCNC: 1.59 MG/DL (ref 0.5–1.3)
GFR SERPL CREATININE-BSD FRML MDRD: 49 ML/MIN/1.73M*2
GLUCOSE SERPL-MCNC: 94 MG/DL (ref 74–99)
PHOSPHATE SERPL-MCNC: 3.4 MG/DL (ref 2.5–4.9)
POTASSIUM SERPL-SCNC: 5.4 MMOL/L (ref 3.5–5.3)
SODIUM SERPL-SCNC: 141 MMOL/L (ref 136–145)

## 2023-11-27 PROCEDURE — 80069 RENAL FUNCTION PANEL: CPT

## 2023-11-27 PROCEDURE — 36415 COLL VENOUS BLD VENIPUNCTURE: CPT

## 2023-12-18 RX ORDER — LISINOPRIL 40 MG/1
40 TABLET ORAL DAILY
COMMUNITY
Start: 2023-09-12 | End: 2024-02-26 | Stop reason: WASHOUT

## 2023-12-18 RX ORDER — LORATADINE 10 MG/1
10 TABLET ORAL EVERY 24 HOURS
COMMUNITY
End: 2024-02-26 | Stop reason: WASHOUT

## 2023-12-18 RX ORDER — FLUTICASONE PROPIONATE 50 MCG
1 SPRAY, SUSPENSION (ML) NASAL EVERY 24 HOURS
COMMUNITY
End: 2024-02-26 | Stop reason: ALTCHOICE

## 2023-12-18 RX ORDER — ASPIRIN 81 MG/1
81 TABLET ORAL DAILY
COMMUNITY
End: 2024-02-26 | Stop reason: WASHOUT

## 2023-12-18 RX ORDER — LEVOTHYROXINE SODIUM 88 UG/1
88 TABLET ORAL EVERY 24 HOURS
COMMUNITY
End: 2024-02-26 | Stop reason: WASHOUT

## 2023-12-18 RX ORDER — VERAPAMIL HYDROCHLORIDE 180 MG/1
180 TABLET, FILM COATED, EXTENDED RELEASE ORAL EVERY 24 HOURS
COMMUNITY
End: 2024-02-26 | Stop reason: WASHOUT

## 2023-12-18 RX ORDER — LOSARTAN POTASSIUM 100 MG/1
25 TABLET ORAL EVERY 24 HOURS
COMMUNITY

## 2023-12-18 RX ORDER — HYDROCHLOROTHIAZIDE 12.5 MG/1
12.5 CAPSULE ORAL EVERY 12 HOURS
COMMUNITY
End: 2024-02-26 | Stop reason: WASHOUT

## 2023-12-18 RX ORDER — MONTELUKAST SODIUM 10 MG/1
10 TABLET ORAL EVERY 24 HOURS
COMMUNITY
End: 2024-02-26 | Stop reason: WASHOUT

## 2023-12-21 ENCOUNTER — LAB (OUTPATIENT)
Dept: LAB | Facility: LAB | Age: 63
End: 2023-12-21
Payer: COMMERCIAL

## 2023-12-21 DIAGNOSIS — N17.9 ACUTE KIDNEY FAILURE, UNSPECIFIED (CMS-HCC): Primary | ICD-10-CM

## 2023-12-21 LAB
ALBUMIN SERPL BCP-MCNC: 3.8 G/DL (ref 3.4–5)
ANION GAP SERPL CALC-SCNC: 11 MMOL/L (ref 10–20)
BUN SERPL-MCNC: 42 MG/DL (ref 6–23)
CALCIUM SERPL-MCNC: 9 MG/DL (ref 8.6–10.3)
CHLORIDE SERPL-SCNC: 105 MMOL/L (ref 98–107)
CO2 SERPL-SCNC: 27 MMOL/L (ref 21–32)
CREAT SERPL-MCNC: 1.52 MG/DL (ref 0.5–1.3)
GFR SERPL CREATININE-BSD FRML MDRD: 51 ML/MIN/1.73M*2
GLUCOSE SERPL-MCNC: 98 MG/DL (ref 74–99)
PHOSPHATE SERPL-MCNC: 3.6 MG/DL (ref 2.5–4.9)
POTASSIUM SERPL-SCNC: 4.3 MMOL/L (ref 3.5–5.3)
SODIUM SERPL-SCNC: 139 MMOL/L (ref 136–145)

## 2023-12-21 PROCEDURE — 80069 RENAL FUNCTION PANEL: CPT

## 2023-12-21 PROCEDURE — 36415 COLL VENOUS BLD VENIPUNCTURE: CPT

## 2024-01-16 ENCOUNTER — LAB (OUTPATIENT)
Dept: LAB | Facility: LAB | Age: 64
End: 2024-01-16
Payer: COMMERCIAL

## 2024-01-16 DIAGNOSIS — N18.31 CHRONIC KIDNEY DISEASE, STAGE 3A (MULTI): Primary | ICD-10-CM

## 2024-01-16 DIAGNOSIS — N18.31 CHRONIC KIDNEY DISEASE, STAGE 3A (MULTI): ICD-10-CM

## 2024-01-16 LAB
ALBUMIN SERPL BCP-MCNC: 3.8 G/DL (ref 3.4–5)
ANION GAP SERPL CALC-SCNC: 11 MMOL/L (ref 10–20)
BUN SERPL-MCNC: 41 MG/DL (ref 6–23)
CALCIUM SERPL-MCNC: 8.7 MG/DL (ref 8.6–10.3)
CHLORIDE SERPL-SCNC: 108 MMOL/L (ref 98–107)
CO2 SERPL-SCNC: 26 MMOL/L (ref 21–32)
CREAT SERPL-MCNC: 1.52 MG/DL (ref 0.5–1.3)
EGFRCR SERPLBLD CKD-EPI 2021: 51 ML/MIN/1.73M*2
GLUCOSE SERPL-MCNC: 147 MG/DL (ref 74–99)
PHOSPHATE SERPL-MCNC: 3.7 MG/DL (ref 2.5–4.9)
POTASSIUM SERPL-SCNC: 4.3 MMOL/L (ref 3.5–5.3)
SODIUM SERPL-SCNC: 141 MMOL/L (ref 136–145)

## 2024-01-16 PROCEDURE — 36415 COLL VENOUS BLD VENIPUNCTURE: CPT

## 2024-01-16 PROCEDURE — 80069 RENAL FUNCTION PANEL: CPT

## 2024-02-26 ENCOUNTER — OFFICE VISIT (OUTPATIENT)
Dept: PRIMARY CARE | Facility: CLINIC | Age: 64
End: 2024-02-26
Payer: COMMERCIAL

## 2024-02-26 VITALS
OXYGEN SATURATION: 99 % | BODY MASS INDEX: 27.74 KG/M2 | SYSTOLIC BLOOD PRESSURE: 169 MMHG | HEART RATE: 80 BPM | RESPIRATION RATE: 16 BRPM | TEMPERATURE: 97.8 F | DIASTOLIC BLOOD PRESSURE: 67 MMHG | HEIGHT: 68 IN | WEIGHT: 183 LBS

## 2024-02-26 DIAGNOSIS — I70.1 BILATERAL RENAL ARTERY STENOSIS (CMS-HCC): ICD-10-CM

## 2024-02-26 DIAGNOSIS — R73.9 HYPERGLYCEMIA: ICD-10-CM

## 2024-02-26 DIAGNOSIS — N18.31 STAGE 3A CHRONIC KIDNEY DISEASE (MULTI): ICD-10-CM

## 2024-02-26 DIAGNOSIS — R79.89 LOW SERUM VITAMIN D: ICD-10-CM

## 2024-02-26 DIAGNOSIS — I10 ESSENTIAL HYPERTENSION: Primary | ICD-10-CM

## 2024-02-26 DIAGNOSIS — D64.9 ANEMIA, UNSPECIFIED TYPE: ICD-10-CM

## 2024-02-26 DIAGNOSIS — E78.5 HYPERLIPIDEMIA LDL GOAL <100: ICD-10-CM

## 2024-02-26 PROBLEM — K92.9 DISORDER OF DIGESTIVE TRACT: Status: ACTIVE | Noted: 2024-02-26

## 2024-02-26 PROBLEM — G89.29 CHRONIC PAIN: Status: ACTIVE | Noted: 2022-06-10

## 2024-02-26 PROBLEM — R09.89 CAROTID BRUIT: Status: ACTIVE | Noted: 2024-02-26

## 2024-02-26 PROCEDURE — 3078F DIAST BP <80 MM HG: CPT | Performed by: INTERNAL MEDICINE

## 2024-02-26 PROCEDURE — 3077F SYST BP >= 140 MM HG: CPT | Performed by: INTERNAL MEDICINE

## 2024-02-26 PROCEDURE — 99214 OFFICE O/P EST MOD 30 MIN: CPT | Performed by: INTERNAL MEDICINE

## 2024-02-26 PROCEDURE — 1036F TOBACCO NON-USER: CPT | Performed by: INTERNAL MEDICINE

## 2024-02-26 SDOH — ECONOMIC STABILITY: FOOD INSECURITY: WITHIN THE PAST 12 MONTHS, THE FOOD YOU BOUGHT JUST DIDN'T LAST AND YOU DIDN'T HAVE MONEY TO GET MORE.: NEVER TRUE

## 2024-02-26 SDOH — ECONOMIC STABILITY: FOOD INSECURITY: WITHIN THE PAST 12 MONTHS, YOU WORRIED THAT YOUR FOOD WOULD RUN OUT BEFORE YOU GOT MONEY TO BUY MORE.: NEVER TRUE

## 2024-02-26 ASSESSMENT — LIFESTYLE VARIABLES
HOW OFTEN DO YOU HAVE SIX OR MORE DRINKS ON ONE OCCASION: NEVER
HOW OFTEN DO YOU HAVE A DRINK CONTAINING ALCOHOL: NEVER
AUDIT-C TOTAL SCORE: 0
SKIP TO QUESTIONS 9-10: 1
HOW MANY STANDARD DRINKS CONTAINING ALCOHOL DO YOU HAVE ON A TYPICAL DAY: PATIENT DOES NOT DRINK

## 2024-02-26 ASSESSMENT — PATIENT HEALTH QUESTIONNAIRE - PHQ9
1. LITTLE INTEREST OR PLEASURE IN DOING THINGS: NOT AT ALL
2. FEELING DOWN, DEPRESSED OR HOPELESS: NOT AT ALL
SUM OF ALL RESPONSES TO PHQ9 QUESTIONS 1 & 2: 0

## 2024-02-26 NOTE — PROGRESS NOTES
"subjective   Patient ID: Gilberto White is a 63 y.o. male who presents for Follow-up (No new health concerns).     HPI      HLD: cholesterol 169, HDL 35.6, , Tri 114, not currently on a statin     HTN: ongoing BP-elevation in office (169/67), patient had an evaluation by Dr Wilson. He did not require renal artery stenting.     Lisinopril-therapy off-and-on but has now been completely removed, on losartan and chlorthalidone per nephrology. Patient currently takes amlodipine, metoprolol, chlorthalidone, and losartan and for BP.     Since starting on Losartan, he admits to developing a cough. But, he states that he is willing to live with the side-effect if it helps with his lightheadedness.     He has a home BP-cuff and measures twice a day. This /60 before coming to the office.     Patient now sees Dr Fink for nephrology. Renal artery US (3/2023) revealed bilateral renal artery stenosis at 60%, the patient apparently did not have significant renal artery stenosis on cath procedure. He did not require stenting. Patient no longer gets lightheaded at times, especially when standing. Follow-up appt scheduled for next week. Goal mentioned of 130/80.     Hyperglycemia:  most recent glucose was 147. Patient has had good fasting glucoses in the recent past. Most recent Hgb A1c 6.6 back in Sept. He resumed exercise 2.5 wks ago.     Cataracts: consultation scheduled in April      Vitamin D deficiency: Takes vitamin D daily.     Review of Systems   All other systems reviewed and are negative.  ,aside from what was mentioned above in HPI.            Objective   /67 (BP Location: Left arm, Patient Position: Sitting, BP Cuff Size: Adult)   Pulse 80   Temp 36.6 °C (97.8 °F)   Resp 16   Ht 1.727 m (5' 8\")   Wt 83 kg (183 lb)   SpO2 99%   BMI 27.83 kg/m²      Physical Exam  Constitutional:       Appearance: Normal appearance. He is not ill-appearing.   HENT:      Head: Normocephalic and atraumatic. "      Right Ear: External ear normal.      Left Ear: External ear normal.   Eyes:      Extraocular Movements: Extraocular movements intact.      Conjunctiva/sclera: Conjunctivae normal.   Neck:      Vascular: Carotid bruit present.      Comments: Soft bilateral bruits are noted, unchanged  Cardiovascular:      Rate and Rhythm: Normal rate and regular rhythm.      Pulses: Normal pulses.      Heart sounds: Murmur heard.      Comments: A soft SM is noted over the LUSB, unchanged  Pulmonary:      Effort: Pulmonary effort is normal.      Breath sounds: Normal breath sounds.   Musculoskeletal:         General: Swelling (b/l below knee to ankle) present. No tenderness. Normal range of motion.      Cervical back: Normal range of motion and neck supple.      Right lower leg: Edema present, he has trace pitting ankle edema bilaterally. Right > left     Left lower leg: Edema present.   Skin:     General: Skin is warm and dry.      Findings: a few pale patches on crown of scalp, no significant pigmented lesions (post sunburn).   Neurological:      General: No focal deficit present.      Mental Status: He is alert and oriented to person, place, and time. Mental status is at baseline.      Motor: No weakness.   Psychiatric:         Mood and Affect: Mood normal.         Behavior: Behavior normal.         Thought Content: Thought content normal.         Judgment: Judgment normal.

## 2024-02-26 NOTE — ASSESSMENT & PLAN NOTE
BP is elevated today, will not change meds since the patient sees nephrology, check labs as ordered

## 2024-02-26 NOTE — PROGRESS NOTES
"Subjective   Patient ID: Gilberto White is a 63 y.o. male who presents for Follow-up (No new health concerns).    HPI     HLD: cholesterol 169, HDL 35.6, , Tri 114, not currently on a statin     HTN: ongoing BP-elevation in office (169/67), patient had an evaluation by Dr Wilson. He did not require renal artery stenting.    Lisinopril-therapy off-and-on but has now been completely removed, on losartan and chlorthalidone per nephrology. Patient currently takes amlodipine, metoprolol, chlorthalidone, and losartan and for BP.    Since starting on Losartan, he admits to developing a cough. But, he states that he is willing to live with the side-effect if it helps with his lightheadedness.    He has a home BP-cuff and measures twice a day. This /60 before coming to the office.     Patient now sees Dr Fink for nephrology. Renal artery US (3/2023) revealed bilateral renal artery stenosis at 60%, the patient apparently did not have significant renal artery stenosis on cath procedure. He did not require stenting. Patient no longer gets lightheaded at times, especially when standing. Follow-up appt scheduled for next week. Goal mentioned of 130/80.     Hyperglycemia:  most recent glucose was 147. Patient has had good fasting glucoses in the recent past. Most recent Hgb A1c 6.6 back in Sept. He resumed exercise 2.5 wks ago.    Cataracts: consultation scheduled in April      Vitamin D deficiency: Takes vitamin D daily.    Review of Systems   All other systems reviewed and are negative.  Negative ,aside from what was mentioned above in HPI.     Objective   /67 (BP Location: Left arm, Patient Position: Sitting, BP Cuff Size: Adult)   Pulse 80   Temp 36.6 °C (97.8 °F)   Resp 16   Ht 1.727 m (5' 8\")   Wt 83 kg (183 lb)   SpO2 99%   BMI 27.83 kg/m²     Physical Exam  Constitutional:       Appearance: Normal appearance. He is not ill-appearing.   HENT:      Head: Normocephalic and atraumatic.      " Right Ear: External ear normal.      Left Ear: External ear normal.   Eyes:      Extraocular Movements: Extraocular movements intact.      Conjunctiva/sclera: Conjunctivae normal.   Neck:      Vascular: Carotid bruit present.      Comments: Soft bilateral bruits are noted, unchanged  Cardiovascular:      Rate and Rhythm: Normal rate and regular rhythm.      Pulses: Normal pulses.      Heart sounds: Murmur heard.      Comments: A soft SM is noted over the LUSB, unchanged  Pulmonary:      Effort: Pulmonary effort is normal.      Breath sounds: Normal breath sounds.   Musculoskeletal:         General: Swelling (b/l below knee to ankle) present. No tenderness. Normal range of motion.      Cervical back: Normal range of motion and neck supple.      Right lower leg: Edema present, he has 1 + pitting ankle edema bilaterally. This is not new     Left lower leg: Edema present.   Skin:     General: Skin is warm and dry.      Findings: a few pale patches on crown of scalp, no significant pigmented lesions (post sunburn).   Neurological:      General: No focal deficit present.      Mental Status: He is alert and oriented to person, place, and time. Mental status is at baseline.      Motor: No weakness.   Psychiatric:         Mood and Affect: Mood normal.         Behavior: Behavior normal.         Thought Content: Thought content normal.         Judgment: Judgment normal.   Assessment/Plan     Problem List Items Addressed This Visit       Anemia    Relevant Orders    CBC and Auto Differential    Comprehensive Metabolic Panel    Essential hypertension - Primary     BP is elevated today, will not change meds since the patient sees nephrology, check labs as ordered         Relevant Orders    Albumin , Urine Random    CBC and Auto Differential    Comprehensive Metabolic Panel    Low serum vitamin D    Relevant Orders    CBC and Auto Differential    Comprehensive Metabolic Panel    Vitamin D 25-Hydroxy,Total (for eval of Vitamin D  levels)    Hyperlipidemia LDL goal <100     Will reorder lipids, if patient has DM , statin therapy is recommended         Relevant Orders    CBC and Auto Differential    Comprehensive Metabolic Panel    Lipid Panel    Hyperglycemia     Continue to monitor labs, concerned that the patient has DM, dietary measures may be adequate, recheck labs now         Relevant Orders    CBC and Auto Differential    Comprehensive Metabolic Panel    Hemoglobin A1C    TSH with reflex to Free T4 if abnormal    Bilateral renal artery stenosis (CMS/HCC)     Follow up with Dr Wilson as needed         Relevant Orders    CBC and Auto Differential    Comprehensive Metabolic Panel    CKD (chronic kidney disease)     To follow up with Dr Avalos         Relevant Orders    CBC and Auto Differential    Comprehensive Metabolic Panel    TSH with reflex to Free T4 if abnormal    Vitamin D 25-Hydroxy,Total (for eval of Vitamin D levels)     Follow up in 6 months  Recheck labs prior to next visit, continue follow up with nephrology , recommend TDAP vaccine when available.

## 2024-02-26 NOTE — ASSESSMENT & PLAN NOTE
Continue to monitor labs, concerned that the patient has DM, dietary measures may be adequate, recheck labs now

## 2024-03-01 ENCOUNTER — LAB (OUTPATIENT)
Dept: LAB | Facility: LAB | Age: 64
End: 2024-03-01
Payer: COMMERCIAL

## 2024-03-01 DIAGNOSIS — N18.31 CHRONIC KIDNEY DISEASE, STAGE 3A (MULTI): Primary | ICD-10-CM

## 2024-03-01 DIAGNOSIS — N18.31 CHRONIC KIDNEY DISEASE, STAGE 3A (MULTI): ICD-10-CM

## 2024-03-01 LAB
25(OH)D3 SERPL-MCNC: 57 NG/ML (ref 30–100)
ALBUMIN SERPL BCP-MCNC: 3.7 G/DL (ref 3.4–5)
ANION GAP SERPL CALC-SCNC: 19 MMOL/L (ref 10–20)
APPEARANCE UR: CLEAR
BILIRUB UR STRIP.AUTO-MCNC: NEGATIVE MG/DL
BUN SERPL-MCNC: 43 MG/DL (ref 6–23)
CALCIUM SERPL-MCNC: 9.2 MG/DL (ref 8.6–10.3)
CHLORIDE SERPL-SCNC: 102 MMOL/L (ref 98–107)
CO2 SERPL-SCNC: 21 MMOL/L (ref 21–32)
COLOR UR: YELLOW
CREAT SERPL-MCNC: 1.57 MG/DL (ref 0.5–1.3)
CREAT UR-MCNC: 66.1 MG/DL (ref 20–370)
EGFRCR SERPLBLD CKD-EPI 2021: 50 ML/MIN/1.73M*2
GLUCOSE SERPL-MCNC: 73 MG/DL (ref 74–99)
GLUCOSE UR STRIP.AUTO-MCNC: NEGATIVE MG/DL
KETONES UR STRIP.AUTO-MCNC: ABNORMAL MG/DL
LEUKOCYTE ESTERASE UR QL STRIP.AUTO: NEGATIVE
MICROALBUMIN UR-MCNC: 160.3 MG/L
MICROALBUMIN/CREAT UR: 242.5 UG/MG CREAT
NITRITE UR QL STRIP.AUTO: NEGATIVE
PH UR STRIP.AUTO: 5 [PH]
PHOSPHATE SERPL-MCNC: 3.5 MG/DL (ref 2.5–4.9)
POTASSIUM SERPL-SCNC: 4.7 MMOL/L (ref 3.5–5.3)
PROT UR STRIP.AUTO-MCNC: ABNORMAL MG/DL
RBC # UR STRIP.AUTO: ABNORMAL /UL
RBC #/AREA URNS AUTO: NORMAL /HPF
SODIUM SERPL-SCNC: 137 MMOL/L (ref 136–145)
SP GR UR STRIP.AUTO: 1.01
UROBILINOGEN UR STRIP.AUTO-MCNC: <2 MG/DL
WBC #/AREA URNS AUTO: NORMAL /HPF

## 2024-03-01 PROCEDURE — 36415 COLL VENOUS BLD VENIPUNCTURE: CPT

## 2024-03-01 PROCEDURE — 82306 VITAMIN D 25 HYDROXY: CPT

## 2024-03-01 PROCEDURE — 82043 UR ALBUMIN QUANTITATIVE: CPT

## 2024-03-01 PROCEDURE — 82570 ASSAY OF URINE CREATININE: CPT

## 2024-03-01 PROCEDURE — 80069 RENAL FUNCTION PANEL: CPT

## 2024-03-01 PROCEDURE — 81001 URINALYSIS AUTO W/SCOPE: CPT

## 2024-07-02 ENCOUNTER — LAB (OUTPATIENT)
Dept: LAB | Facility: LAB | Age: 64
End: 2024-07-02
Payer: COMMERCIAL

## 2024-07-02 DIAGNOSIS — N20.0 CALCULUS OF KIDNEY: ICD-10-CM

## 2024-07-02 DIAGNOSIS — N18.31 CHRONIC KIDNEY DISEASE, STAGE 3A (MULTI): Primary | ICD-10-CM

## 2024-07-02 LAB
ALBUMIN SERPL BCP-MCNC: 3.9 G/DL (ref 3.4–5)
ANION GAP SERPL CALC-SCNC: 12 MMOL/L (ref 10–20)
APPEARANCE UR: CLEAR
BILIRUB UR STRIP.AUTO-MCNC: NEGATIVE MG/DL
BUN SERPL-MCNC: 60 MG/DL (ref 6–23)
CALCIUM SERPL-MCNC: 8.8 MG/DL (ref 8.6–10.3)
CHLORIDE SERPL-SCNC: 100 MMOL/L (ref 98–107)
CO2 SERPL-SCNC: 26 MMOL/L (ref 21–32)
COLOR UR: YELLOW
CREAT SERPL-MCNC: 1.81 MG/DL (ref 0.5–1.3)
CREAT UR-MCNC: 137.3 MG/DL (ref 20–370)
EGFRCR SERPLBLD CKD-EPI 2021: 42 ML/MIN/1.73M*2
GLUCOSE SERPL-MCNC: 89 MG/DL (ref 74–99)
GLUCOSE UR STRIP.AUTO-MCNC: ABNORMAL MG/DL
HYALINE CASTS #/AREA URNS AUTO: ABNORMAL /LPF
KETONES UR STRIP.AUTO-MCNC: NEGATIVE MG/DL
LEUKOCYTE ESTERASE UR QL STRIP.AUTO: NEGATIVE
MICROALBUMIN UR-MCNC: 160.7 MG/L
MICROALBUMIN/CREAT UR: 117 UG/MG CREAT
MUCOUS THREADS #/AREA URNS AUTO: ABNORMAL /LPF
NITRITE UR QL STRIP.AUTO: NEGATIVE
PH UR STRIP.AUTO: 5 [PH]
PHOSPHATE SERPL-MCNC: 3.9 MG/DL (ref 2.5–4.9)
POTASSIUM SERPL-SCNC: 4.4 MMOL/L (ref 3.5–5.3)
PROT UR STRIP.AUTO-MCNC: ABNORMAL MG/DL
PTH-INTACT SERPL-MCNC: 68.3 PG/ML (ref 18.5–88)
RBC # UR STRIP.AUTO: ABNORMAL /UL
RBC #/AREA URNS AUTO: >20 /HPF
SODIUM SERPL-SCNC: 134 MMOL/L (ref 136–145)
SP GR UR STRIP.AUTO: 1.01
SQUAMOUS #/AREA URNS AUTO: ABNORMAL /HPF
UROBILINOGEN UR STRIP.AUTO-MCNC: NORMAL MG/DL
WBC #/AREA URNS AUTO: ABNORMAL /HPF

## 2024-07-02 PROCEDURE — 36415 COLL VENOUS BLD VENIPUNCTURE: CPT

## 2024-07-02 PROCEDURE — 80069 RENAL FUNCTION PANEL: CPT

## 2024-07-02 PROCEDURE — 81001 URINALYSIS AUTO W/SCOPE: CPT

## 2024-07-02 PROCEDURE — 82043 UR ALBUMIN QUANTITATIVE: CPT

## 2024-07-02 PROCEDURE — 83970 ASSAY OF PARATHORMONE: CPT

## 2024-07-02 PROCEDURE — 82570 ASSAY OF URINE CREATININE: CPT

## 2024-08-20 ENCOUNTER — LAB (OUTPATIENT)
Dept: LAB | Facility: LAB | Age: 64
End: 2024-08-20
Payer: COMMERCIAL

## 2024-08-20 DIAGNOSIS — R79.89 LOW SERUM VITAMIN D: ICD-10-CM

## 2024-08-20 DIAGNOSIS — N18.31 STAGE 3A CHRONIC KIDNEY DISEASE (MULTI): ICD-10-CM

## 2024-08-20 DIAGNOSIS — I70.1 BILATERAL RENAL ARTERY STENOSIS (CMS-HCC): ICD-10-CM

## 2024-08-20 DIAGNOSIS — I10 ESSENTIAL HYPERTENSION: ICD-10-CM

## 2024-08-20 DIAGNOSIS — D64.9 ANEMIA, UNSPECIFIED TYPE: ICD-10-CM

## 2024-08-20 DIAGNOSIS — R73.9 HYPERGLYCEMIA: ICD-10-CM

## 2024-08-20 DIAGNOSIS — E78.5 HYPERLIPIDEMIA LDL GOAL <100: ICD-10-CM

## 2024-08-20 LAB
25(OH)D3 SERPL-MCNC: 67 NG/ML (ref 30–100)
ALBUMIN SERPL BCP-MCNC: 4.1 G/DL (ref 3.4–5)
ALP SERPL-CCNC: 69 U/L (ref 33–136)
ALT SERPL W P-5'-P-CCNC: 16 U/L (ref 10–52)
ANION GAP SERPL CALC-SCNC: 12 MMOL/L (ref 10–20)
AST SERPL W P-5'-P-CCNC: 24 U/L (ref 9–39)
BASOPHILS # BLD AUTO: 0.05 X10*3/UL (ref 0–0.1)
BASOPHILS NFR BLD AUTO: 1 %
BILIRUB SERPL-MCNC: 1.5 MG/DL (ref 0–1.2)
BUN SERPL-MCNC: 59 MG/DL (ref 6–23)
CALCIUM SERPL-MCNC: 9.2 MG/DL (ref 8.6–10.3)
CHLORIDE SERPL-SCNC: 102 MMOL/L (ref 98–107)
CHOLEST SERPL-MCNC: 211 MG/DL (ref 0–199)
CHOLESTEROL/HDL RATIO: 4.7
CO2 SERPL-SCNC: 27 MMOL/L (ref 21–32)
CREAT SERPL-MCNC: 1.65 MG/DL (ref 0.5–1.3)
CREAT UR-MCNC: 41.1 MG/DL (ref 20–370)
EGFRCR SERPLBLD CKD-EPI 2021: 47 ML/MIN/1.73M*2
EOSINOPHIL # BLD AUTO: 0.22 X10*3/UL (ref 0–0.7)
EOSINOPHIL NFR BLD AUTO: 4.4 %
ERYTHROCYTE [DISTWIDTH] IN BLOOD BY AUTOMATED COUNT: 12.2 % (ref 11.5–14.5)
EST. AVERAGE GLUCOSE BLD GHB EST-MCNC: 126 MG/DL
GLUCOSE SERPL-MCNC: 80 MG/DL (ref 74–99)
HBA1C MFR BLD: 6 %
HCT VFR BLD AUTO: 31.9 % (ref 41–52)
HDLC SERPL-MCNC: 44.6 MG/DL
HGB BLD-MCNC: 10.7 G/DL (ref 13.5–17.5)
IMM GRANULOCYTES # BLD AUTO: 0.01 X10*3/UL (ref 0–0.7)
IMM GRANULOCYTES NFR BLD AUTO: 0.2 % (ref 0–0.9)
LDLC SERPL CALC-MCNC: 140 MG/DL
LYMPHOCYTES # BLD AUTO: 1.5 X10*3/UL (ref 1.2–4.8)
LYMPHOCYTES NFR BLD AUTO: 29.9 %
MCH RBC QN AUTO: 32.4 PG (ref 26–34)
MCHC RBC AUTO-ENTMCNC: 33.5 G/DL (ref 32–36)
MCV RBC AUTO: 97 FL (ref 80–100)
MICROALBUMIN UR-MCNC: 83.1 MG/L
MICROALBUMIN/CREAT UR: 202.2 UG/MG CREAT
MONOCYTES # BLD AUTO: 0.57 X10*3/UL (ref 0.1–1)
MONOCYTES NFR BLD AUTO: 11.4 %
NEUTROPHILS # BLD AUTO: 2.66 X10*3/UL (ref 1.2–7.7)
NEUTROPHILS NFR BLD AUTO: 53.1 %
NON HDL CHOLESTEROL: 166 MG/DL (ref 0–149)
NRBC BLD-RTO: 0 /100 WBCS (ref 0–0)
PLATELET # BLD AUTO: 180 X10*3/UL (ref 150–450)
POTASSIUM SERPL-SCNC: 4.5 MMOL/L (ref 3.5–5.3)
PROT SERPL-MCNC: 7.1 G/DL (ref 6.4–8.2)
RBC # BLD AUTO: 3.3 X10*6/UL (ref 4.5–5.9)
SODIUM SERPL-SCNC: 136 MMOL/L (ref 136–145)
TRIGL SERPL-MCNC: 134 MG/DL (ref 0–149)
TSH SERPL-ACNC: 1.24 MIU/L (ref 0.44–3.98)
VLDL: 27 MG/DL (ref 0–40)
WBC # BLD AUTO: 5 X10*3/UL (ref 4.4–11.3)

## 2024-08-20 PROCEDURE — 80053 COMPREHEN METABOLIC PANEL: CPT

## 2024-08-20 PROCEDURE — 82306 VITAMIN D 25 HYDROXY: CPT

## 2024-08-20 PROCEDURE — 84443 ASSAY THYROID STIM HORMONE: CPT

## 2024-08-20 PROCEDURE — 83036 HEMOGLOBIN GLYCOSYLATED A1C: CPT

## 2024-08-20 PROCEDURE — 85025 COMPLETE CBC W/AUTO DIFF WBC: CPT

## 2024-08-20 PROCEDURE — 82043 UR ALBUMIN QUANTITATIVE: CPT

## 2024-08-20 PROCEDURE — 36415 COLL VENOUS BLD VENIPUNCTURE: CPT

## 2024-08-20 PROCEDURE — 82570 ASSAY OF URINE CREATININE: CPT

## 2024-08-20 PROCEDURE — 80061 LIPID PANEL: CPT

## 2024-08-28 ENCOUNTER — APPOINTMENT (OUTPATIENT)
Dept: PRIMARY CARE | Facility: CLINIC | Age: 64
End: 2024-08-28
Payer: COMMERCIAL

## 2024-08-28 VITALS
BODY MASS INDEX: 27.04 KG/M2 | TEMPERATURE: 97.5 F | DIASTOLIC BLOOD PRESSURE: 70 MMHG | HEART RATE: 85 BPM | RESPIRATION RATE: 16 BRPM | SYSTOLIC BLOOD PRESSURE: 145 MMHG | WEIGHT: 178.4 LBS | HEIGHT: 68 IN | OXYGEN SATURATION: 99 %

## 2024-08-28 DIAGNOSIS — R79.89 LOW SERUM VITAMIN D: ICD-10-CM

## 2024-08-28 DIAGNOSIS — L98.9 LESION OF SKIN OF SCALP: ICD-10-CM

## 2024-08-28 DIAGNOSIS — Z12.5 PROSTATE CANCER SCREENING: ICD-10-CM

## 2024-08-28 DIAGNOSIS — N18.31 TYPE 2 DIABETES MELLITUS WITH STAGE 3A CHRONIC KIDNEY DISEASE, WITHOUT LONG-TERM CURRENT USE OF INSULIN (MULTI): ICD-10-CM

## 2024-08-28 DIAGNOSIS — E11.22 TYPE 2 DIABETES MELLITUS WITH STAGE 3A CHRONIC KIDNEY DISEASE, WITHOUT LONG-TERM CURRENT USE OF INSULIN (MULTI): ICD-10-CM

## 2024-08-28 DIAGNOSIS — E78.5 HYPERLIPIDEMIA LDL GOAL <100: Primary | ICD-10-CM

## 2024-08-28 DIAGNOSIS — I10 ESSENTIAL HYPERTENSION: ICD-10-CM

## 2024-08-28 DIAGNOSIS — N18.31 STAGE 3A CHRONIC KIDNEY DISEASE (MULTI): ICD-10-CM

## 2024-08-28 DIAGNOSIS — L97.511 SKIN ULCER OF TOE OF RIGHT FOOT, LIMITED TO BREAKDOWN OF SKIN (MULTI): ICD-10-CM

## 2024-08-28 PROCEDURE — 3077F SYST BP >= 140 MM HG: CPT | Performed by: INTERNAL MEDICINE

## 2024-08-28 PROCEDURE — 3008F BODY MASS INDEX DOCD: CPT | Performed by: INTERNAL MEDICINE

## 2024-08-28 PROCEDURE — 3044F HG A1C LEVEL LT 7.0%: CPT | Performed by: INTERNAL MEDICINE

## 2024-08-28 PROCEDURE — 99215 OFFICE O/P EST HI 40 MIN: CPT | Performed by: INTERNAL MEDICINE

## 2024-08-28 PROCEDURE — 3050F LDL-C >= 130 MG/DL: CPT | Performed by: INTERNAL MEDICINE

## 2024-08-28 PROCEDURE — 3078F DIAST BP <80 MM HG: CPT | Performed by: INTERNAL MEDICINE

## 2024-08-28 PROCEDURE — 4010F ACE/ARB THERAPY RXD/TAKEN: CPT | Performed by: INTERNAL MEDICINE

## 2024-08-28 PROCEDURE — 1036F TOBACCO NON-USER: CPT | Performed by: INTERNAL MEDICINE

## 2024-08-28 PROCEDURE — 3060F POS MICROALBUMINURIA REV: CPT | Performed by: INTERNAL MEDICINE

## 2024-08-28 RX ORDER — EMPAGLIFLOZIN 10 MG/1
1 TABLET, FILM COATED ORAL DAILY
COMMUNITY
Start: 2024-03-05 | End: 2024-09-01

## 2024-08-28 RX ORDER — AFLIBERCEPT 40 MG/ML
2 INJECTION, SOLUTION INTRAVITREAL
COMMUNITY

## 2024-08-28 RX ORDER — ROSUVASTATIN CALCIUM 10 MG/1
10 TABLET, COATED ORAL DAILY
Qty: 90 TABLET | Refills: 3 | Status: SHIPPED | OUTPATIENT
Start: 2024-08-28 | End: 2025-10-02

## 2024-08-28 SDOH — ECONOMIC STABILITY: FOOD INSECURITY: WITHIN THE PAST 12 MONTHS, THE FOOD YOU BOUGHT JUST DIDN'T LAST AND YOU DIDN'T HAVE MONEY TO GET MORE.: NEVER TRUE

## 2024-08-28 SDOH — ECONOMIC STABILITY: FOOD INSECURITY: WITHIN THE PAST 12 MONTHS, YOU WORRIED THAT YOUR FOOD WOULD RUN OUT BEFORE YOU GOT MONEY TO BUY MORE.: NEVER TRUE

## 2024-08-28 ASSESSMENT — PATIENT HEALTH QUESTIONNAIRE - PHQ9
1. LITTLE INTEREST OR PLEASURE IN DOING THINGS: NOT AT ALL
SUM OF ALL RESPONSES TO PHQ9 QUESTIONS 1 & 2: 0
2. FEELING DOWN, DEPRESSED OR HOPELESS: NOT AT ALL

## 2024-08-28 ASSESSMENT — LIFESTYLE VARIABLES
HOW OFTEN DO YOU HAVE SIX OR MORE DRINKS ON ONE OCCASION: NEVER
HOW MANY STANDARD DRINKS CONTAINING ALCOHOL DO YOU HAVE ON A TYPICAL DAY: PATIENT DOES NOT DRINK
SKIP TO QUESTIONS 9-10: 1
HOW OFTEN DO YOU HAVE A DRINK CONTAINING ALCOHOL: NEVER
AUDIT-C TOTAL SCORE: 0

## 2024-08-28 NOTE — ASSESSMENT & PLAN NOTE
Patient now takes Jardiance, will monitor for now, may require a dose increase in the future, follow up with ophthalmology also

## 2024-08-28 NOTE — PROGRESS NOTES
Chief Complaint/HPI:    HLD:  not currently on a statin, labs recently completed     HTN:  patient had an evaluation by Dr Wilson. He did not require renal artery stenting.     Patient currently takes amlodipine, metoprolol, chlorthalidone, and losartan and for BP.  Patient did decrease the amlodipine.     Patient now sees Dr Fink for nephrology. Renal artery US (3/2023) revealed bilateral renal artery stenosis at 60%, the patient apparently did not have significant renal artery stenosis on cath procedure. He did not require stenting. Patient no longer gets lightheaded at times, especially when standing.     DM type 2: He takes Jardiance now. Patient walks 4-5 miles per day now, the patient now takes low dose Jardiance. Patient has a history of retinal swelling. He initially had a HgbA1C of 6.6, it is now 6.0  , he generally follows a keto diet.    Callus on toe: patient has developed a crusted lesion on the right great toe. Patient developed some right great toe swelling after vascular procedure.      Cataracts: these are stable, no changes.      Vitamin D deficiency: Takes vitamin D daily.    ROS otherwise negative aside from what was mentioned above in HPI.      Patient Active Problem List   Diagnosis    Anemia    Chronic arthralgias of knees and hips    Essential hypertension    Hyperbilirubinemia    Low serum vitamin D    Hyperlipidemia LDL goal <100    Albuminuria    Type 2 diabetes mellitus with chronic kidney disease, without long-term current use of insulin (Multi)    Bilateral renal artery stenosis (CMS-HCC)    CKD (chronic kidney disease)    Pseudoaneurysm of iliac artery (CMS-HCC)    Renal artery stenosis (CMS-HCC)    Carotid bruit    Chronic pain    Disorder of digestive tract    Skin ulcer of toe of right foot, limited to breakdown of skin (Multi)    Lesion of skin of scalp         Past Medical History:   Diagnosis Date    Anemia 02/03/2023    Chronic arthralgias of knees and hips 02/03/2023     "Essential hypertension 02/03/2023    Hyperbilirubinemia 02/03/2023    Hypertension     Low serum vitamin D 02/03/2023     Past Surgical History:   Procedure Laterality Date    OTHER SURGICAL HISTORY  11/24/2021    Tonsillectomy with adenoidectomy    OTHER SURGICAL HISTORY  11/24/2021    [place] tooth extraction     Social History     Social History Narrative    Not on file         ALLERGIES  Patient has no known allergies.      MEDICATIONS  Current Outpatient Medications on File Prior to Visit   Medication Sig Dispense Refill    aflibercept (Eylea) 2 mg/0.05 mL intra-ocular injection 0.05 mL (2 mg) by intravitreal route every 30 (thirty) days.      amLODIPine (Norvasc) 5 mg tablet TAKE 1 TABLET BY MOUTH EVERY DAY 90 tablet 1    chlorthalidone (Hygroton) 25 mg tablet Take 1 tablet (25 mg) by mouth once daily.      cholecalciferol (Vitamin D-3) 125 MCG (5000 UT) capsule Vitamin D3 125 MCG (5000 UT) Oral Capsule   Refills: 0       Active      Jardiance 10 mg Take 1 tablet (10 mg) by mouth once daily.      losartan (Cozaar) 100 mg tablet Take 25 mg by mouth once every 24 hours.      metoprolol succinate XL (Toprol-XL) 25 mg 24 hr tablet Take 1 tablet (25 mg) by mouth once daily.      multivit-minerals/FA/lycopene (ONE DAILY FOR MEN ORAL)        No current facility-administered medications on file prior to visit.         PHYSICAL EXAM  /70 (BP Location: Left arm, Patient Position: Sitting, BP Cuff Size: Adult)   Pulse 85   Temp 36.4 °C (97.5 °F)   Resp 16   Ht 1.727 m (5' 8\")   Wt 80.9 kg (178 lb 6.4 oz)   SpO2 99%   BMI 27.13 kg/m²   Body mass index is 27.13 kg/m².    Constitutional:       Appearance: Normal appearance. He is not ill-appearing.   HENT:      Head: Normocephalic and atraumatic.      Right Ear: External ear normal.      Left Ear: External ear normal.   Eyes:      Extraocular Movements: Extraocular movements intact.      Conjunctiva/sclera: Conjunctivae normal.   Neck:        Comments:  carotid " bruits are not noted today  Cardiovascular:      Rate and Rhythm: Normal rate and regular rhythm.      Pulses: Normal pulses.      Heart sounds: Murmur heard.      Comments: A soft SM is noted over the LUSB, unchanged  Pulmonary:      Effort: Pulmonary effort is normal.      Breath sounds: Normal breath sounds.   Musculoskeletal:         General: no edema is present today. No tenderness. Normal range of motion.      Cervical back: Normal range of motion and neck supple.      Right lower leg: no edema is noted      Left lower leg: no edema is noted  Skin:     General: skin on the crown of scalp is slightly erythematous and some crusting is noted       A crusted closed callus on the dorsal right great toe. The right 1st MCP joint is swelled also.    Neurological:      General: No focal deficit present.      Mental Status: He is alert and oriented to person, place, and time. Mental status is at baseline.      Motor: No weakness.   Psychiatric:         Mood and Affect: Mood normal.         Behavior: Behavior normal.         Thought Content: Thought content normal.         Judgment: Judgment normal.        ASSESSMENT/PLAN  Problem List Items Addressed This Visit       CKD (chronic kidney disease)    Relevant Orders    CBC and Auto Differential    Comprehensive Metabolic Panel    Albumin-Creatinine Ratio, Urine Random    Essential hypertension    Current Assessment & Plan     BP has improved, will not change meds for now         Relevant Orders    CBC and Auto Differential    Comprehensive Metabolic Panel    Hyperlipidemia LDL goal <100 - Primary    Current Assessment & Plan     LDL is still elevated, will start rosuvastatin 10 mg daily, recheck labs in 3 months         Relevant Medications    rosuvastatin (Crestor) 10 mg tablet    Other Relevant Orders    CBC and Auto Differential    Comprehensive Metabolic Panel    Lipid Panel    Lesion of skin of scalp    Relevant Orders    Referral to Dermatology    Low serum vitamin  D    Current Assessment & Plan     Continue vitamin d supplement         Relevant Orders    CBC and Auto Differential    Comprehensive Metabolic Panel    Vitamin D 25-Hydroxy,Total (for eval of Vitamin D levels)    Skin ulcer of toe of right foot, limited to breakdown of skin (Multi)    Relevant Orders    Uric acid    Referral to Podiatry    CBC and Auto Differential    Comprehensive Metabolic Panel    Type 2 diabetes mellitus with chronic kidney disease, without long-term current use of insulin (Multi)    Current Assessment & Plan     Patient now takes Jardiance, will monitor for now, may require a dose increase in the future, follow up with ophthalmology also         Relevant Medications    rosuvastatin (Crestor) 10 mg tablet    Other Relevant Orders    Uric acid    Referral to Podiatry    CBC and Auto Differential    Comprehensive Metabolic Panel    Hemoglobin A1C    Albumin-Creatinine Ratio, Urine Random     Other Visit Diagnoses       Prostate cancer screening        Relevant Orders    Prostate Spec.Ag,Screen    CBC and Auto Differential    Comprehensive Metabolic Panel          Recheck labs in 2-3 months  Follow up in 4 months to review, see podiatry for exam of foot    Federico Katz MD

## 2024-09-09 ENCOUNTER — LAB (OUTPATIENT)
Dept: LAB | Facility: LAB | Age: 64
End: 2024-09-09
Payer: COMMERCIAL

## 2024-09-09 DIAGNOSIS — N18.31 TYPE 2 DIABETES MELLITUS WITH STAGE 3A CHRONIC KIDNEY DISEASE, WITHOUT LONG-TERM CURRENT USE OF INSULIN (MULTI): ICD-10-CM

## 2024-09-09 DIAGNOSIS — Z12.5 PROSTATE CANCER SCREENING: ICD-10-CM

## 2024-09-09 DIAGNOSIS — L97.511 SKIN ULCER OF TOE OF RIGHT FOOT, LIMITED TO BREAKDOWN OF SKIN (MULTI): ICD-10-CM

## 2024-09-09 DIAGNOSIS — E11.22 TYPE 2 DIABETES MELLITUS WITH STAGE 3A CHRONIC KIDNEY DISEASE, WITHOUT LONG-TERM CURRENT USE OF INSULIN (MULTI): ICD-10-CM

## 2024-09-09 LAB
PSA SERPL-MCNC: 0.36 NG/ML
URATE SERPL-MCNC: 7.7 MG/DL (ref 4–7.5)

## 2024-09-09 PROCEDURE — 84153 ASSAY OF PSA TOTAL: CPT

## 2024-09-09 PROCEDURE — 36415 COLL VENOUS BLD VENIPUNCTURE: CPT

## 2024-09-09 PROCEDURE — 84550 ASSAY OF BLOOD/URIC ACID: CPT

## 2024-09-22 ENCOUNTER — APPOINTMENT (OUTPATIENT)
Dept: RADIOLOGY | Facility: HOSPITAL | Age: 64
DRG: 853 | End: 2024-09-22
Payer: COMMERCIAL

## 2024-09-22 ENCOUNTER — HOSPITAL ENCOUNTER (INPATIENT)
Facility: HOSPITAL | Age: 64
DRG: 853 | End: 2024-09-22
Attending: EMERGENCY MEDICINE | Admitting: SURGERY
Payer: COMMERCIAL

## 2024-09-22 ENCOUNTER — ANESTHESIA (OUTPATIENT)
Dept: OPERATING ROOM | Facility: HOSPITAL | Age: 64
End: 2024-09-22
Payer: COMMERCIAL

## 2024-09-22 ENCOUNTER — APPOINTMENT (OUTPATIENT)
Dept: CARDIOLOGY | Facility: HOSPITAL | Age: 64
DRG: 853 | End: 2024-09-22
Payer: COMMERCIAL

## 2024-09-22 ENCOUNTER — ANESTHESIA EVENT (OUTPATIENT)
Dept: OPERATING ROOM | Facility: HOSPITAL | Age: 64
End: 2024-09-22
Payer: COMMERCIAL

## 2024-09-22 VITALS
OXYGEN SATURATION: 94 % | HEIGHT: 68 IN | BODY MASS INDEX: 26.98 KG/M2 | HEART RATE: 85 BPM | TEMPERATURE: 99.1 F | RESPIRATION RATE: 18 BRPM | SYSTOLIC BLOOD PRESSURE: 124 MMHG | DIASTOLIC BLOOD PRESSURE: 61 MMHG | WEIGHT: 178 LBS

## 2024-09-22 DIAGNOSIS — K35.32 ACUTE APPENDICITIS WITH PERFORATION AND LOCALIZED PERITONITIS, UNSPECIFIED WHETHER ABSCESS PRESENT, UNSPECIFIED WHETHER GANGRENE PRESENT: ICD-10-CM

## 2024-09-22 DIAGNOSIS — K35.30 ACUTE APPENDICITIS WITH LOCALIZED PERITONITIS, WITHOUT PERFORATION OR ABSCESS, UNSPECIFIED WHETHER GANGRENE PRESENT: ICD-10-CM

## 2024-09-22 DIAGNOSIS — K35.33 ACUTE APPENDICITIS WITH APPENDICEAL ABSCESS: Primary | ICD-10-CM

## 2024-09-22 LAB
ALBUMIN SERPL BCP-MCNC: 3.9 G/DL (ref 3.4–5)
ALP SERPL-CCNC: 79 U/L (ref 33–136)
ALT SERPL W P-5'-P-CCNC: 25 U/L (ref 10–52)
ANION GAP SERPL CALC-SCNC: 12 MMOL/L (ref 10–20)
APPEARANCE UR: CLEAR
AST SERPL W P-5'-P-CCNC: 26 U/L (ref 9–39)
BASOPHILS # BLD MANUAL: 0.09 X10*3/UL (ref 0–0.1)
BASOPHILS NFR BLD MANUAL: 1 %
BILIRUB SERPL-MCNC: 0.7 MG/DL (ref 0–1.2)
BILIRUB UR STRIP.AUTO-MCNC: NEGATIVE MG/DL
BUN SERPL-MCNC: 89 MG/DL (ref 6–23)
CALCIUM SERPL-MCNC: 8.2 MG/DL (ref 8.6–10.3)
CHLORIDE SERPL-SCNC: 106 MMOL/L (ref 98–107)
CO2 SERPL-SCNC: 23 MMOL/L (ref 21–32)
COLOR UR: ABNORMAL
CREAT SERPL-MCNC: 2.27 MG/DL (ref 0.5–1.3)
EGFRCR SERPLBLD CKD-EPI 2021: 32 ML/MIN/1.73M*2
EOSINOPHIL # BLD MANUAL: 0.09 X10*3/UL (ref 0–0.7)
EOSINOPHIL NFR BLD MANUAL: 1 %
ERYTHROCYTE [DISTWIDTH] IN BLOOD BY AUTOMATED COUNT: 12 % (ref 11.5–14.5)
GLUCOSE SERPL-MCNC: 120 MG/DL (ref 74–99)
GLUCOSE UR STRIP.AUTO-MCNC: ABNORMAL MG/DL
GRAN CASTS #/AREA UR COMP ASSIST: ABNORMAL /LPF
HCT VFR BLD AUTO: 30.1 % (ref 41–52)
HGB BLD-MCNC: 10.2 G/DL (ref 13.5–17.5)
HYALINE CASTS #/AREA URNS AUTO: ABNORMAL /LPF
IMM GRANULOCYTES # BLD AUTO: 0.06 X10*3/UL (ref 0–0.7)
IMM GRANULOCYTES NFR BLD AUTO: 0.6 % (ref 0–0.9)
KETONES UR STRIP.AUTO-MCNC: NEGATIVE MG/DL
LEUKOCYTE ESTERASE UR QL STRIP.AUTO: NEGATIVE
LIPASE SERPL-CCNC: 32 U/L (ref 9–82)
LYMPHOCYTES # BLD MANUAL: 0.74 X10*3/UL (ref 1.2–4.8)
LYMPHOCYTES NFR BLD MANUAL: 8 %
MCH RBC QN AUTO: 32.1 PG (ref 26–34)
MCHC RBC AUTO-ENTMCNC: 33.9 G/DL (ref 32–36)
MCV RBC AUTO: 95 FL (ref 80–100)
MONOCYTES # BLD MANUAL: 0.47 X10*3/UL (ref 0.1–1)
MONOCYTES NFR BLD MANUAL: 5 %
MUCOUS THREADS #/AREA URNS AUTO: ABNORMAL /LPF
NEUTS SEG # BLD MANUAL: 7.44 X10*3/UL (ref 1.2–7)
NEUTS SEG NFR BLD MANUAL: 80 %
NITRITE UR QL STRIP.AUTO: NEGATIVE
NRBC BLD-RTO: 0 /100 WBCS (ref 0–0)
PH UR STRIP.AUTO: 5 [PH]
PLATELET # BLD AUTO: 209 X10*3/UL (ref 150–450)
POTASSIUM SERPL-SCNC: 4 MMOL/L (ref 3.5–5.3)
PROT SERPL-MCNC: 7.3 G/DL (ref 6.4–8.2)
PROT UR STRIP.AUTO-MCNC: ABNORMAL MG/DL
RBC # BLD AUTO: 3.18 X10*6/UL (ref 4.5–5.9)
RBC # UR STRIP.AUTO: ABNORMAL /UL
RBC #/AREA URNS AUTO: ABNORMAL /HPF
RBC MORPH BLD: ABNORMAL
SODIUM SERPL-SCNC: 137 MMOL/L (ref 136–145)
SP GR UR STRIP.AUTO: 1.01
SQUAMOUS #/AREA URNS AUTO: ABNORMAL /HPF
TOTAL CELLS COUNTED BLD: 100
UROBILINOGEN UR STRIP.AUTO-MCNC: NORMAL MG/DL
VARIANT LYMPHS # BLD MANUAL: 0.47 X10*3/UL (ref 0–0.5)
VARIANT LYMPHS NFR BLD: 5 %
WBC # BLD AUTO: 9.3 X10*3/UL (ref 4.4–11.3)
WBC #/AREA URNS AUTO: ABNORMAL /HPF

## 2024-09-22 PROCEDURE — 2500000004 HC RX 250 GENERAL PHARMACY W/ HCPCS (ALT 636 FOR OP/ED): Performed by: EMERGENCY MEDICINE

## 2024-09-22 PROCEDURE — 2500000004 HC RX 250 GENERAL PHARMACY W/ HCPCS (ALT 636 FOR OP/ED): Performed by: PHYSICIAN ASSISTANT

## 2024-09-22 PROCEDURE — 1210000001 HC SEMI-PRIVATE ROOM DAILY

## 2024-09-22 PROCEDURE — 99285 EMERGENCY DEPT VISIT HI MDM: CPT | Mod: 25

## 2024-09-22 PROCEDURE — 96365 THER/PROPH/DIAG IV INF INIT: CPT

## 2024-09-22 PROCEDURE — 2500000004 HC RX 250 GENERAL PHARMACY W/ HCPCS (ALT 636 FOR OP/ED): Performed by: SURGERY

## 2024-09-22 PROCEDURE — 85007 BL SMEAR W/DIFF WBC COUNT: CPT | Performed by: PHYSICIAN ASSISTANT

## 2024-09-22 PROCEDURE — 96375 TX/PRO/DX INJ NEW DRUG ADDON: CPT

## 2024-09-22 PROCEDURE — 2500000005 HC RX 250 GENERAL PHARMACY W/O HCPCS: Performed by: NURSE ANESTHETIST, CERTIFIED REGISTERED

## 2024-09-22 PROCEDURE — 87077 CULTURE AEROBIC IDENTIFY: CPT | Mod: PORLAB | Performed by: SURGERY

## 2024-09-22 PROCEDURE — 7100000001 HC RECOVERY ROOM TIME - INITIAL BASE CHARGE: Performed by: SURGERY

## 2024-09-22 PROCEDURE — 2500000004 HC RX 250 GENERAL PHARMACY W/ HCPCS (ALT 636 FOR OP/ED): Performed by: ANESTHESIOLOGY

## 2024-09-22 PROCEDURE — 2500000004 HC RX 250 GENERAL PHARMACY W/ HCPCS (ALT 636 FOR OP/ED): Performed by: NURSE ANESTHETIST, CERTIFIED REGISTERED

## 2024-09-22 PROCEDURE — 93005 ELECTROCARDIOGRAM TRACING: CPT

## 2024-09-22 PROCEDURE — 3700000002 HC GENERAL ANESTHESIA TIME - EACH INCREMENTAL 1 MINUTE: Performed by: SURGERY

## 2024-09-22 PROCEDURE — 0752T DGTZ GLS MCRSCP SLD LVL III: CPT | Mod: TC,PORLAB | Performed by: SURGERY

## 2024-09-22 PROCEDURE — 93010 ELECTROCARDIOGRAM REPORT: CPT | Performed by: INTERNAL MEDICINE

## 2024-09-22 PROCEDURE — 3600000004 HC OR TIME - INITIAL BASE CHARGE - PROCEDURE LEVEL FOUR: Performed by: SURGERY

## 2024-09-22 PROCEDURE — 99221 1ST HOSP IP/OBS SF/LOW 40: CPT

## 2024-09-22 PROCEDURE — 2720000007 HC OR 272 NO HCPCS: Performed by: SURGERY

## 2024-09-22 PROCEDURE — 96361 HYDRATE IV INFUSION ADD-ON: CPT

## 2024-09-22 PROCEDURE — 36415 COLL VENOUS BLD VENIPUNCTURE: CPT | Performed by: PHYSICIAN ASSISTANT

## 2024-09-22 PROCEDURE — 74176 CT ABD & PELVIS W/O CONTRAST: CPT | Performed by: RADIOLOGY

## 2024-09-22 PROCEDURE — 2500000004 HC RX 250 GENERAL PHARMACY W/ HCPCS (ALT 636 FOR OP/ED)

## 2024-09-22 PROCEDURE — 7100000002 HC RECOVERY ROOM TIME - EACH INCREMENTAL 1 MINUTE: Performed by: SURGERY

## 2024-09-22 PROCEDURE — 83690 ASSAY OF LIPASE: CPT | Performed by: PHYSICIAN ASSISTANT

## 2024-09-22 PROCEDURE — 3700000001 HC GENERAL ANESTHESIA TIME - INITIAL BASE CHARGE: Performed by: SURGERY

## 2024-09-22 PROCEDURE — 80053 COMPREHEN METABOLIC PANEL: CPT | Performed by: PHYSICIAN ASSISTANT

## 2024-09-22 PROCEDURE — 81001 URINALYSIS AUTO W/SCOPE: CPT | Performed by: PHYSICIAN ASSISTANT

## 2024-09-22 PROCEDURE — 85027 COMPLETE CBC AUTOMATED: CPT | Performed by: PHYSICIAN ASSISTANT

## 2024-09-22 PROCEDURE — 74176 CT ABD & PELVIS W/O CONTRAST: CPT

## 2024-09-22 PROCEDURE — 3600000009 HC OR TIME - EACH INCREMENTAL 1 MINUTE - PROCEDURE LEVEL FOUR: Performed by: SURGERY

## 2024-09-22 PROCEDURE — 0DTJ0ZZ RESECTION OF APPENDIX, OPEN APPROACH: ICD-10-PCS | Performed by: SURGERY

## 2024-09-22 RX ORDER — AMLODIPINE BESYLATE 5 MG/1
5 TABLET ORAL DAILY
Status: DISCONTINUED | OUTPATIENT
Start: 2024-09-23 | End: 2024-09-26

## 2024-09-22 RX ORDER — ORPHENADRINE CITRATE 30 MG/ML
30 INJECTION INTRAMUSCULAR; INTRAVENOUS ONCE
Status: COMPLETED | OUTPATIENT
Start: 2024-09-22 | End: 2024-09-22

## 2024-09-22 RX ORDER — ROSUVASTATIN CALCIUM 10 MG/1
10 TABLET, COATED ORAL NIGHTLY
Status: DISCONTINUED | OUTPATIENT
Start: 2024-09-22 | End: 2024-09-28 | Stop reason: HOSPADM

## 2024-09-22 RX ORDER — ROCURONIUM BROMIDE 10 MG/ML
INJECTION, SOLUTION INTRAVENOUS AS NEEDED
Status: DISCONTINUED | OUTPATIENT
Start: 2024-09-22 | End: 2024-09-22

## 2024-09-22 RX ORDER — METOPROLOL SUCCINATE 25 MG/1
25 TABLET, EXTENDED RELEASE ORAL DAILY
Status: DISCONTINUED | OUTPATIENT
Start: 2024-09-23 | End: 2024-09-28 | Stop reason: HOSPADM

## 2024-09-22 RX ORDER — MORPHINE SULFATE 2 MG/ML
2 INJECTION, SOLUTION INTRAMUSCULAR; INTRAVENOUS EVERY 5 MIN PRN
Status: DISCONTINUED | OUTPATIENT
Start: 2024-09-22 | End: 2024-09-22 | Stop reason: HOSPADM

## 2024-09-22 RX ORDER — PROPOFOL 10 MG/ML
INJECTION, EMULSION INTRAVENOUS AS NEEDED
Status: DISCONTINUED | OUTPATIENT
Start: 2024-09-22 | End: 2024-09-22

## 2024-09-22 RX ORDER — SODIUM CHLORIDE, SODIUM LACTATE, POTASSIUM CHLORIDE, CALCIUM CHLORIDE 600; 310; 30; 20 MG/100ML; MG/100ML; MG/100ML; MG/100ML
100 INJECTION, SOLUTION INTRAVENOUS CONTINUOUS
Status: DISCONTINUED | OUTPATIENT
Start: 2024-09-22 | End: 2024-09-25

## 2024-09-22 RX ORDER — HYDROMORPHONE HYDROCHLORIDE 1 MG/ML
1 INJECTION, SOLUTION INTRAMUSCULAR; INTRAVENOUS; SUBCUTANEOUS EVERY 5 MIN PRN
Status: DISCONTINUED | OUTPATIENT
Start: 2024-09-22 | End: 2024-09-22 | Stop reason: HOSPADM

## 2024-09-22 RX ORDER — MORPHINE SULFATE 4 MG/ML
4 INJECTION INTRAVENOUS EVERY 4 HOURS PRN
Status: DISCONTINUED | OUTPATIENT
Start: 2024-09-22 | End: 2024-09-28 | Stop reason: HOSPADM

## 2024-09-22 RX ORDER — PHENYLEPHRINE HCL IN 0.9% NACL 1 MG/10 ML
SYRINGE (ML) INTRAVENOUS AS NEEDED
Status: DISCONTINUED | OUTPATIENT
Start: 2024-09-22 | End: 2024-09-22

## 2024-09-22 RX ORDER — ONDANSETRON HYDROCHLORIDE 2 MG/ML
4 INJECTION, SOLUTION INTRAVENOUS EVERY 6 HOURS PRN
Status: DISCONTINUED | OUTPATIENT
Start: 2024-09-22 | End: 2024-09-28 | Stop reason: HOSPADM

## 2024-09-22 RX ORDER — SODIUM CHLORIDE, SODIUM LACTATE, POTASSIUM CHLORIDE, CALCIUM CHLORIDE 600; 310; 30; 20 MG/100ML; MG/100ML; MG/100ML; MG/100ML
100 INJECTION, SOLUTION INTRAVENOUS CONTINUOUS
Status: DISCONTINUED | OUTPATIENT
Start: 2024-09-22 | End: 2024-09-22 | Stop reason: HOSPADM

## 2024-09-22 RX ORDER — LIDOCAINE HYDROCHLORIDE 10 MG/ML
0.1 INJECTION, SOLUTION EPIDURAL; INFILTRATION; INTRACAUDAL; PERINEURAL ONCE
Status: DISCONTINUED | OUTPATIENT
Start: 2024-09-22 | End: 2024-09-22 | Stop reason: HOSPADM

## 2024-09-22 RX ORDER — LIDOCAINE HYDROCHLORIDE 20 MG/ML
INJECTION, SOLUTION INFILTRATION; PERINEURAL AS NEEDED
Status: DISCONTINUED | OUTPATIENT
Start: 2024-09-22 | End: 2024-09-22

## 2024-09-22 RX ORDER — CHLORTHALIDONE 25 MG/1
25 TABLET ORAL DAILY
Status: DISCONTINUED | OUTPATIENT
Start: 2024-09-23 | End: 2024-09-28 | Stop reason: HOSPADM

## 2024-09-22 RX ORDER — SODIUM CHLORIDE, SODIUM LACTATE, POTASSIUM CHLORIDE, CALCIUM CHLORIDE 600; 310; 30; 20 MG/100ML; MG/100ML; MG/100ML; MG/100ML
INJECTION, SOLUTION INTRAVENOUS CONTINUOUS PRN
Status: DISCONTINUED | OUTPATIENT
Start: 2024-09-22 | End: 2024-09-22

## 2024-09-22 RX ORDER — LOSARTAN POTASSIUM 25 MG/1
25 TABLET ORAL DAILY
Status: DISCONTINUED | OUTPATIENT
Start: 2024-09-23 | End: 2024-09-28 | Stop reason: HOSPADM

## 2024-09-22 RX ORDER — ACETAMINOPHEN 325 MG/1
650 TABLET ORAL EVERY 4 HOURS PRN
Status: DISCONTINUED | OUTPATIENT
Start: 2024-09-22 | End: 2024-09-28 | Stop reason: HOSPADM

## 2024-09-22 RX ORDER — FENTANYL CITRATE 50 UG/ML
INJECTION, SOLUTION INTRAMUSCULAR; INTRAVENOUS AS NEEDED
Status: DISCONTINUED | OUTPATIENT
Start: 2024-09-22 | End: 2024-09-22

## 2024-09-22 RX ORDER — ONDANSETRON HYDROCHLORIDE 2 MG/ML
INJECTION, SOLUTION INTRAVENOUS AS NEEDED
Status: DISCONTINUED | OUTPATIENT
Start: 2024-09-22 | End: 2024-09-22

## 2024-09-22 RX ORDER — MORPHINE SULFATE 2 MG/ML
2 INJECTION, SOLUTION INTRAMUSCULAR; INTRAVENOUS EVERY 4 HOURS PRN
Status: DISCONTINUED | OUTPATIENT
Start: 2024-09-22 | End: 2024-09-28 | Stop reason: HOSPADM

## 2024-09-22 RX ADMIN — PIPERACILLIN SODIUM AND TAZOBACTAM SODIUM 3.38 G: 3; .375 INJECTION, SOLUTION INTRAVENOUS at 16:50

## 2024-09-22 RX ADMIN — ONDANSETRON 4 MG: 2 INJECTION INTRAMUSCULAR; INTRAVENOUS at 23:10

## 2024-09-22 RX ADMIN — MORPHINE SULFATE 2 MG: 2 INJECTION, SOLUTION INTRAMUSCULAR; INTRAVENOUS at 20:20

## 2024-09-22 RX ADMIN — PROMETHAZINE HYDROCHLORIDE 12.5 MG: 25 INJECTION INTRAMUSCULAR; INTRAVENOUS at 20:41

## 2024-09-22 RX ADMIN — SODIUM CHLORIDE, POTASSIUM CHLORIDE, SODIUM LACTATE AND CALCIUM CHLORIDE 100 ML/HR: 600; 310; 30; 20 INJECTION, SOLUTION INTRAVENOUS at 22:40

## 2024-09-22 RX ADMIN — ORPHENADRINE CITRATE 30 MG: 30 INJECTION, SOLUTION INTRAMUSCULAR; INTRAVENOUS at 14:44

## 2024-09-22 RX ADMIN — SODIUM CHLORIDE 1000 ML: 9 INJECTION, SOLUTION INTRAVENOUS at 14:42

## 2024-09-22 RX ADMIN — PIPERACILLIN SODIUM AND TAZOBACTAM SODIUM 2.25 G: 2; .25 INJECTION, SOLUTION INTRAVENOUS at 22:40

## 2024-09-22 SDOH — HEALTH STABILITY: MENTAL HEALTH: CURRENT SMOKER: 0

## 2024-09-22 SDOH — SOCIAL STABILITY: SOCIAL INSECURITY: WERE YOU ABLE TO COMPLETE ALL THE BEHAVIORAL HEALTH SCREENINGS?: YES

## 2024-09-22 SDOH — SOCIAL STABILITY: SOCIAL INSECURITY: HAVE YOU HAD THOUGHTS OF HARMING ANYONE ELSE?: NO

## 2024-09-22 ASSESSMENT — PAIN SCALES - GENERAL
PAINLEVEL_OUTOF10: 4
PAINLEVEL_OUTOF10: 6
PAIN_LEVEL: 1
PAINLEVEL_OUTOF10: 5 - MODERATE PAIN
PAINLEVEL_OUTOF10: 0 - NO PAIN
PAINLEVEL_OUTOF10: 6

## 2024-09-22 ASSESSMENT — COGNITIVE AND FUNCTIONAL STATUS - GENERAL
DAILY ACTIVITIY SCORE: 19
DRESSING REGULAR UPPER BODY CLOTHING: A LITTLE
DRESSING REGULAR LOWER BODY CLOTHING: A LITTLE
PERSONAL GROOMING: A LITTLE
TOILETING: A LITTLE
MOBILITY SCORE: 18
CLIMB 3 TO 5 STEPS WITH RAILING: A LITTLE
TURNING FROM BACK TO SIDE WHILE IN FLAT BAD: A LITTLE
WALKING IN HOSPITAL ROOM: A LITTLE
STANDING UP FROM CHAIR USING ARMS: A LITTLE
HELP NEEDED FOR BATHING: A LITTLE
PATIENT BASELINE BEDBOUND: NO
MOVING TO AND FROM BED TO CHAIR: A LITTLE
MOVING FROM LYING ON BACK TO SITTING ON SIDE OF FLAT BED WITH BEDRAILS: A LITTLE

## 2024-09-22 ASSESSMENT — ACTIVITIES OF DAILY LIVING (ADL)
HEARING - RIGHT EAR: FUNCTIONAL
JUDGMENT_ADEQUATE_SAFELY_COMPLETE_DAILY_ACTIVITIES: YES
ASSISTIVE_DEVICE: EYEGLASSES
TOILETING: INDEPENDENT
LACK_OF_TRANSPORTATION: NO
DRESSING YOURSELF: INDEPENDENT
BATHING: INDEPENDENT
FEEDING YOURSELF: INDEPENDENT
WALKS IN HOME: INDEPENDENT
PATIENT'S MEMORY ADEQUATE TO SAFELY COMPLETE DAILY ACTIVITIES?: YES
ADEQUATE_TO_COMPLETE_ADL: YES
GROOMING: INDEPENDENT
HEARING - LEFT EAR: FUNCTIONAL

## 2024-09-22 ASSESSMENT — COLUMBIA-SUICIDE SEVERITY RATING SCALE - C-SSRS
6. HAVE YOU EVER DONE ANYTHING, STARTED TO DO ANYTHING, OR PREPARED TO DO ANYTHING TO END YOUR LIFE?: NO
1. IN THE PAST MONTH, HAVE YOU WISHED YOU WERE DEAD OR WISHED YOU COULD GO TO SLEEP AND NOT WAKE UP?: NO
2. HAVE YOU ACTUALLY HAD ANY THOUGHTS OF KILLING YOURSELF?: NO

## 2024-09-22 ASSESSMENT — LIFESTYLE VARIABLES
EVER FELT BAD OR GUILTY ABOUT YOUR DRINKING: NO
TOTAL SCORE: 0
HAVE YOU EVER FELT YOU SHOULD CUT DOWN ON YOUR DRINKING: NO
HOW OFTEN DO YOU HAVE A DRINK CONTAINING ALCOHOL: NEVER
HAVE PEOPLE ANNOYED YOU BY CRITICIZING YOUR DRINKING: NO
SKIP TO QUESTIONS 9-10: 1
EVER HAD A DRINK FIRST THING IN THE MORNING TO STEADY YOUR NERVES TO GET RID OF A HANGOVER: NO
HOW OFTEN DO YOU HAVE 6 OR MORE DRINKS ON ONE OCCASION: NEVER
AUDIT-C TOTAL SCORE: 0
HOW MANY STANDARD DRINKS CONTAINING ALCOHOL DO YOU HAVE ON A TYPICAL DAY: PATIENT DOES NOT DRINK
AUDIT-C TOTAL SCORE: 0

## 2024-09-22 ASSESSMENT — PAIN - FUNCTIONAL ASSESSMENT
PAIN_FUNCTIONAL_ASSESSMENT: 0-10

## 2024-09-22 ASSESSMENT — PAIN DESCRIPTION - LOCATION: LOCATION: ABDOMEN

## 2024-09-22 ASSESSMENT — PAIN DESCRIPTION - ORIENTATION: ORIENTATION: MID;LOWER

## 2024-09-22 NOTE — ED PROVIDER NOTES
HPI   Chief Complaint   Patient presents with    Flank Pain     Began last Monday after lifting heavy objects. Nothing make it better or worse.       History of present illness: 63-year-old male with history of diabetes, kidney stones, chronic kidney disease, hypertension complains of right flank and abdominal pain for 1 week.  Says the pain has been intermittent primarily with movement.  Patient states that he was lifting objects when the pain began.  Pain was improving with Biofreeze and he then helped lift a 200 pound individual.  Patient has a history of kidney stones and says this feels somewhat similar to the pain.  He is concerned because the pain has not gone away.  Denies dysuria polyuria hematuria nausea vomiting diarrhea constipation.    Review of systems: Constitutional, eye, ENT, cardiovascular, respiratory, gastrointestinal, genitourinary, neurologic, musculoskeletal, dermatologic, hematologic, endocrine systems were evaluated and were negative unless otherwise specified in history of present illness.    Medications: Reviewed and per nursing note.    Family history: Denies relevant medical conditions.    Social history: Denies tobacco, alcohol, drug use.      Physical exam:    Appearance: Well-developed, well-nourished, nontoxic-appearing, alert and oriented x3. Talking in complete sentences.    HEENT:  Head normocephalic atraumatic, extraocular movements intact, pupils equal round reactive to light, mucous membranes are moist and pink.    NECK:  Nml Inspection, no meningismus, no thyromegaly, no lymphadenopathy, no JVD, trachea is midline.    Respiratory: Clear to auscultation bilaterally with normal bilateral excursion. No wheezes, rhonchi, crackles.    Cardiovascular: Regular rate and rhythm, no murmurs, rubs or gallops. Pulses 2+ symmetrically in the dorsalis pedis and radial pulses.    Abdomen/GI: Slight right lower quadrant tenderness with no rebound guarding or rigidity.  Soft, nondistended,  normal bowel sounds x4. No masses or organomegaly.    : Right CVA tenderness    Neuro:  Oriented x 3, Speech Clear, cranial nerves grossly intact. Normal sensation to light touch in all 4 extremities.    Musculoskeletal: Patient spontaneously moves all 4 extremities.    Skin:  No cyanosis, clubbing, edema, open wounds, or rashes.                Patient History   Past Medical History:   Diagnosis Date    Anemia 02/03/2023    Chronic arthralgias of knees and hips 02/03/2023    Essential hypertension 02/03/2023    Hyperbilirubinemia 02/03/2023    Hypertension     Low serum vitamin D 02/03/2023     Past Surgical History:   Procedure Laterality Date    OTHER SURGICAL HISTORY  11/24/2021    Tonsillectomy with adenoidectomy    OTHER SURGICAL HISTORY  11/24/2021    [place] tooth extraction     Family History   Problem Relation Name Age of Onset    Stroke Mother      Diabetes Mother      Breast cancer Mother      Diabetes type II Mother          with other skin complication    Diabetes Father      Myasthenia gravis Father      Diabetes type II Father      Stroke Sibling       Social History     Tobacco Use    Smoking status: Never    Smokeless tobacco: Never   Vaping Use    Vaping status: Never Used   Substance Use Topics    Alcohol use: Never    Drug use: Never       Physical Exam   ED Triage Vitals [09/22/24 1339]   Temperature Heart Rate Respirations BP   36.6 °C (97.9 °F) 74 16 158/71      Pulse Ox Temp src Heart Rate Source Patient Position   98 % -- -- --      BP Location FiO2 (%)     -- --       Physical Exam      ED Course & MDM   Diagnoses as of 09/24/24 0948   Acute appendicitis with appendiceal abscess                 No data recorded     Gunner Coma Scale Score: 15 (09/24/24 0844 : Kindra Hemphill RN)                           Medical Decision Making  63-year-old male with abdominal and flank pain.  Differential diagnosis of muscle strain, spasm, appendicitis, kidney stone, pyelonephritis, pancreatitis,  diverticulitis.  Examination shows tenderness in the right flank and right lower abdomen.  No associated nausea vomiting or urine changes.  History of kidney stones.    CBC CMP urinalysis lipase CT ab pelvis ordered.  Given normal saline and Norflex IV.    Diagnostic studies show glucose 120, BUN 89, creatinine 2.27 with known history of CKD, urinalysis with blood and no bacteria.  No UTI.  CBC with normal white blood cell count hemoglobin 10.2.  CT abdomen pelvis shows markedly dilated and thick-walled appendix containing radiopaque material approximately suspect appendicolith with associated periappendiceal fat stranding with no clear abscess or perforation.  Presentation most consistent with acute appendicitis.  Patient has nonobstructing nephrolithiasis bilateral with perinephric edema.  No clear UTI.    Spoke with Dr. Davis  general surgeon who evaluated patient.  Intends to take him to the operating room for acute appendicitis.  Patient is treated with IV antibiotics with Zosyn and cultures were obtained.  Patient remains hemodynamically stable.        Procedure  Procedures     Dave Casper PA-C  09/22/24 1709       Dave Casper PA-C  09/22/24 1714       Dave Casper PA-C  09/24/24 0955

## 2024-09-22 NOTE — H&P
History Of Present Illness  Gilberto White is a 63 y.o. male presenting with r upper abd pain.     Past Medical History  Past Medical History:   Diagnosis Date    Anemia 02/03/2023    Chronic arthralgias of knees and hips 02/03/2023    Essential hypertension 02/03/2023    Hyperbilirubinemia 02/03/2023    Hypertension     Low serum vitamin D 02/03/2023       Surgical History  Past Surgical History:   Procedure Laterality Date    OTHER SURGICAL HISTORY  11/24/2021    Tonsillectomy with adenoidectomy    OTHER SURGICAL HISTORY  11/24/2021    [place] tooth extraction        Social History  He reports that he has never smoked. He has never used smokeless tobacco. He reports that he does not drink alcohol and does not use drugs.    Family History  Family History   Problem Relation Name Age of Onset    Stroke Mother      Diabetes Mother      Breast cancer Mother      Diabetes type II Mother          with other skin complication    Diabetes Father      Myasthenia gravis Father      Diabetes type II Father      Stroke Sibling          Allergies  Patient has no known allergies.    Review of Systems  Noncontributory  Physical Exam  Constitutional:       Appearance: Normal appearance. He is normal weight.   HENT:      Head: Normocephalic.      Nose: Nose normal.   Cardiovascular:      Rate and Rhythm: Normal rate and regular rhythm.   Pulmonary:      Effort: Pulmonary effort is normal.      Breath sounds: Normal breath sounds.   Abdominal:      General: Abdomen is flat. There is no distension.      Palpations: Abdomen is soft. There is no mass.      Tenderness: There is abdominal tenderness (ruq laterally). There is no guarding or rebound.   Musculoskeletal:         General: Normal range of motion.   Skin:     General: Skin is warm and dry.   Neurological:      General: No focal deficit present.      Mental Status: He is alert and oriented to person, place, and time.   Psychiatric:         Mood and Affect: Mood normal.         "  Last Recorded Vitals  Blood pressure 158/71, pulse 74, temperature 36.6 °C (97.9 °F), resp. rate 16, height 1.727 m (5' 8\"), weight 80.7 kg (178 lb), SpO2 98%.    Relevant Results      Results for orders placed or performed during the hospital encounter of 09/22/24 (from the past 24 hour(s))   CBC and Auto Differential   Result Value Ref Range    WBC 9.3 4.4 - 11.3 x10*3/uL    nRBC 0.0 0.0 - 0.0 /100 WBCs    RBC 3.18 (L) 4.50 - 5.90 x10*6/uL    Hemoglobin 10.2 (L) 13.5 - 17.5 g/dL    Hematocrit 30.1 (L) 41.0 - 52.0 %    MCV 95 80 - 100 fL    MCH 32.1 26.0 - 34.0 pg    MCHC 33.9 32.0 - 36.0 g/dL    RDW 12.0 11.5 - 14.5 %    Platelets 209 150 - 450 x10*3/uL    Immature Granulocytes %, Automated 0.6 0.0 - 0.9 %    Immature Granulocytes Absolute, Automated 0.06 0.00 - 0.70 x10*3/uL   Comprehensive metabolic panel   Result Value Ref Range    Glucose 120 (H) 74 - 99 mg/dL    Sodium 137 136 - 145 mmol/L    Potassium 4.0 3.5 - 5.3 mmol/L    Chloride 106 98 - 107 mmol/L    Bicarbonate 23 21 - 32 mmol/L    Anion Gap 12 10 - 20 mmol/L    Urea Nitrogen 89 (H) 6 - 23 mg/dL    Creatinine 2.27 (H) 0.50 - 1.30 mg/dL    eGFR 32 (L) >60 mL/min/1.73m*2    Calcium 8.2 (L) 8.6 - 10.3 mg/dL    Albumin 3.9 3.4 - 5.0 g/dL    Alkaline Phosphatase 79 33 - 136 U/L    Total Protein 7.3 6.4 - 8.2 g/dL    AST 26 9 - 39 U/L    Bilirubin, Total 0.7 0.0 - 1.2 mg/dL    ALT 25 10 - 52 U/L   Lipase   Result Value Ref Range    Lipase 32 9 - 82 U/L   Manual Differential   Result Value Ref Range    Neutrophils %, Manual 80.0 40.0 - 80.0 %    Lymphocytes %, Manual 8.0 13.0 - 44.0 %    Monocytes %, Manual 5.0 2.0 - 10.0 %    Eosinophils %, Manual 1.0 0.0 - 6.0 %    Basophils %, Manual 1.0 0.0 - 2.0 %    Atypical Lymphocytes %, Manual 5.0 0.0 - 2.0 %    Seg Neutrophils Absolute, Manual 7.44 (H) 1.20 - 7.00 x10*3/uL    Lymphocytes Absolute, Manual 0.74 (L) 1.20 - 4.80 x10*3/uL    Monocytes Absolute, Manual 0.47 0.10 - 1.00 x10*3/uL    Eosinophils Absolute, " Manual 0.09 0.00 - 0.70 x10*3/uL    Basophils Absolute, Manual 0.09 0.00 - 0.10 x10*3/uL    Atypical Lymphs Absolute, Manual 0.47 0.00 - 0.50 x10*3/uL    Total Cells Counted 100     RBC Morphology No significant RBC morphology present    Urinalysis with Reflex Culture and Microscopic   Result Value Ref Range    Color, Urine Light-Yellow Light-Yellow, Yellow, Dark-Yellow    Appearance, Urine Clear Clear    Specific Gravity, Urine 1.011 1.005 - 1.035    pH, Urine 5.0 5.0, 5.5, 6.0, 6.5, 7.0, 7.5, 8.0    Protein, Urine 20 (TRACE) NEGATIVE, 10 (TRACE), 20 (TRACE) mg/dL    Glucose, Urine OVER (4+) (A) Normal mg/dL    Blood, Urine 1.0 (3+) (A) NEGATIVE    Ketones, Urine NEGATIVE NEGATIVE mg/dL    Bilirubin, Urine NEGATIVE NEGATIVE    Urobilinogen, Urine Normal Normal mg/dL    Nitrite, Urine NEGATIVE NEGATIVE    Leukocyte Esterase, Urine NEGATIVE NEGATIVE   Urinalysis Microscopic   Result Value Ref Range    WBC, Urine 1-5 1-5, NONE /HPF    RBC, Urine 11-20 (A) NONE, 1-2, 3-5 /HPF    Squamous Epithelial Cells, Urine 1-9 (SPARSE) Reference range not established. /HPF    Mucus, Urine FEW Reference range not established. /LPF    Hyaline Casts, Urine 2+ (A) NONE /LPF    Fine Granular Casts, Urine 2+ (A) NONE /LPF    CT abdomen pelvis wo IV contrast    Result Date: 9/22/2024  Interpreted By:  Anna Stephens, STUDY: CT ABDOMEN PELVIS WO IV CONTRAST;  9/22/2024 3:08 pm   INDICATION: Signs/Symptoms:right flank and rlq pain, tender, hx kidney stones.   COMPARISON: 09/05/2023   ACCESSION NUMBER(S): [id number]   ORDERING CLINICIAN: ANGELINA MCFADDEN   TECHNIQUE: CT of the abdomen and pelvis was performed. Contiguous axial images were obtained at 3 mm slice thickness through the abdomen and pelvis. Coronal and sagittal reconstructions at 3 mm slice thickness were performed.  No intravenous or oral contrast agents were administered.   FINDINGS: Please note that the evaluation of vessels, lymph nodes and organs is limited without intravenous  contrast.   LOWER CHEST: There are bands of atelectasis in the lung bases and right middle lobe. No pleural effusion. Gynecomastia noted.   ABDOMEN:   LIVER: Normal size, contour, and density. No focal lesion evident.   GALLBLADDER: Normal-size. Hazy increased density in the dependent portion of the gallbladder, likely sludge. No definite radiopaque gallstone.   BILE DUCTS: Normal caliber.   PANCREAS: The pancreas appears within normal limits, no evidence of pancreatitis or mass.   SPLEEN: Normal size.   ADRENAL GLANDS: Within normal limits.   KIDNEYS AND URETERS: Bilateral perinephric edema. No hydronephrosis. Bilateral nonobstructing calculi are present. The largest is in the right renal pelvis, triangular shaped, 1.3 cm. No ureteral stone. No focal lesion evident.   PELVIS:   BLADDER: Within normal limits.   REPRODUCTIVE ORGANS: Mild prostate enlargement.   BOWEL: The stomach is unremarkable. The small bowel is normal in caliber without evidence of focal wall thickening or obstruction. There is no evidence of focal wall thickening or dilatation of the large bowel. Markedly abnormal appendix is diffusely thickened and dilated, 1.8 cm diameter, with significant surrounding fat stranding, extends along right paracolic gutter up to near the gallbladder fossa. There is calcific density column of material in the proximal appendix which may represent 1 or more appendicoliths although difficult to measure. Significant fat stranding present around the appendix, there is no definite fluid collection or abscess. No evidence of perforation.   VESSELS: Normal caliber aorta. Atherosclerosis present.   PERITONEUM/RETROPERITONEUM/LYMPH NODES: Retroperitoneal edema bilateral perinephric space and along the paracolic gutters. Edema surrounds the dilated appendix, however no significant free fluid. No pneumoperitoneum. No adenopathy.   BONES AND ABDOMINAL WALL: No evidence of acute fracture. No suspicious osseous lesions are  identified.Mild degenerative changes in the spine and hips. Small fat containing left inguinal hernia.       1.  Acute appendicitis. There is a markedly dilated (1.8 cm) and thick-walled appendix containing some radiopaque material proximally, suspect there is probably an appendicolith within although difficult to measure. Periappendiceal fat stranding is present, however no definite abscess or evidence of perforation. 2. Bilateral nonobstructing nephrolithiasis. Perinephric edema is symmetric and nonspecific. Correlate for any clinical evidence of UTI. 3. Ancillary findings as above.     Signed by: Anna Stephens 9/22/2024 4:09 PM Dictation workstation:   [id number]             Assessment/Plan   Assessment & Plan      Acute appendicitis       I spent 20 minutes in the professional and overall care of this patient.      Harsha Davis MD

## 2024-09-22 NOTE — ANESTHESIA PROCEDURE NOTES
Airway  Date/Time: 9/22/2024 6:33 PM  Urgency: emergent    Airway not difficult    Staffing  Performed: CRNA   Authorized by: TIMOTHY Sims    Performed by: TIMOTHY Sims  Patient location during procedure: OR    Consent for Airway (if performed for an anesthetic, see related documentation for consents)  Patient identity confirmed: verbally with patient, arm band, provided demographic data and hospital-assigned identification number  Consent: No emergent situation. Verbal consent obtained. Written consent obtained.  Risks and benefits: risks, benefits and alternatives were discussed  Consent given by: patient and spouse      Indications and Patient Condition  Indications for airway management: anesthesia  Spontaneous ventilation: present  Sedation level: deep  Preoxygenated: yes  Patient position: sniffing  MILS maintained throughout  Mask difficulty assessment: 1 - vent by mask  Planned trial extubation    Final Airway Details  Final airway type: endotracheal airway      Successful airway: ETT  Cuffed: yes   Successful intubation technique: direct laryngoscopy  Facilitating devices/methods: cricoid pressure and intubating stylet  Endotracheal tube insertion site: oral  Blade: Ines  Blade size: #4  ETT size (mm): 7.5  Cormack-Lehane Classification: grade I - full view of glottis  Placement verified by: chest auscultation and capnometry   Inital cuff pressure (cm H2O): 10  Cuff volume (mL): 5  Measured from: lips  ETT to lips (cm): 22  Number of attempts at approach: 1  Ventilation between attempts: none  Number of other approaches attempted: 0

## 2024-09-22 NOTE — OP NOTE
Appendectomy Laparoscopy, RUPTURED, CONVERTED TO OPEN Operative Note     Date: 2024  OR Location: [facility]    Name: Gilberto White, : 1960, Age: 63 y.o., MRN: [id number], Sex: male    Diagnosis  Acute appendicitis Acute perforated appendicitis with abscess     Procedures  Appendectomy Laparoscopy, RUPTURED, CONVERTED TO OPEN  [id number] - IN LAPAROSCOPIC APPENDECTOMY      Surgeons      * Harsha Davis - Primary    Resident/Fellow/Other Assistant:  Surgeons and Role:  * No surgeons found with a matching role *  Sarvan  Procedure Summary  Anesthesia: General  ASA: II  Anesthesia Staff: CRNA: SASHA Sims-CRNA; SASHA Dennis-CRNA  Estimated Blood Loss: 4mL  Intra-op Medications: Administrations occurring from 1800 to 2015 on 24:  * No intraprocedure medications in log *           Anesthesia Record               Intraprocedure I/O Totals       None           Specimen:   ID Type Source Tests Collected by Time   1 : Appendix Tissue APPENDIX SURGICAL PATHOLOGY EXAM Harsha Davis MD 2024 [id number]        Staff:   Circulator: Matilde  Scrub Person: Geovany  Scrub Person: Shankar         Drains and/or Catheters: * None in log *    Tourniquet Times:         Implants:     Findings:       Indications: Gilberto White is an 63 y.o. male who is having surgery for .  Acute appendicitis    The patient was seen in the preoperative area. The risks, benefits, complications, treatment options, non-operative alternatives, expected recovery and outcomes were discussed with the patient. The possibilities of reaction to medication, pulmonary aspiration, injury to surrounding structures, bleeding, recurrent infection, the need for additional procedures, failure to diagnose a condition, and creating a complication requiring transfusion or operation were discussed with the patient. The patient concurred with the proposed plan, giving informed consent.  The site of surgery was properly noted/marked if necessary per policy. The  patient has been actively warmed in preoperative area. Preoperative antibiotics were given in the emergency room venous thrombosis prophylaxis have been ordered including bilateral sequential compression devices    Procedure Details: Patient is a 63-year-old male who notes almost a week of symptoms of right lateral upper abdominal discomfort.  He thought this was related to some work he did 6 days ago however the pain persisted he came to the emergency room he was found to have appendicitis.  He was counseled and brought to the operating room for laparoscopic appendectomy    Patient was brought to the operating room placed in supine position placed under general endotracheal anesthesia.  Orogastric tube Kunz catheter PAS stockings were placed.  Abdomen was prepped and draped in usual sterile fashion.  Given the position of his appendix supraumbilical incision was made carried down through the subcutaneous tissues to the fascia which was elevated bilaterally incised and a Venita clamp was used into the peritoneum bluntly.  Bilateral stay sutures 0 Vicryl were placed.  Blackwell trocar was placed.  Abdomen was insufflated to a pressure of 15 with CO2 gas.  A subxiphoid 5 mm port was placed as was a left upper quadrant port.  These were 5 mm.  In the operating room patient was found to have a mass in the right upper quadrant in the area of where the appendix was once the abdomen was insufflated to a pressure of 15 patient was placed headdown and turned to the left.  The right colon was found to be somewhat adhesed to the anterior abdominal wall and when this was teased down we had the on rush of pus which was cultured.  We could see the ruptured necrotic appendix and we could not get to the base to do the inflammatory changes and hence the ports were removed patient was open both water was placed and we took down some adhesions of the peritoneum to the right colon and this allowed us to present the base of the appendix  which was pink and healthy.  Right ankle was placed around the base of the appendix and to ties of 0 silk were placed and the appendix was transected.  Distal appendix was then dissected out using a combination of traction and right angle clips and cautery.  The base of the appendix was cauterized using a hemostat on the Bovie.  Wound was irrigated copiously abdomen was cultured again prior to irrigating and then the fascia was closed with running double-stranded PDS.  Skin was closed with interrupted staples and lj.  Upper 5 mm subxiphoid port closed with staples.  Prior to closure of 10 mm Luis Antonio-Sharif had been placed in the appendiceal bed covering where the abscess was.  This was brought out through the left upper quadrant wound.  Patient was then awakened extubated returned to recovery room in satisfactory condition.  Complications:  None; patient tolerated the procedure well.    Disposition: PACU - hemodynamically stable.  Condition: stable         Additional Details:       Attending Attestation: I performed the procedure.    Harsha Davis  Phone Number: [phone]

## 2024-09-22 NOTE — ANESTHESIA PREPROCEDURE EVALUATION
Patient: Gilberto White    Procedure Information       Date/Time: 09/22/24 1800    Location: [facility] 01 / [facility]    Surgeons: Harsha Davis MD            Relevant Problems   Cardiac   (+) Bilateral renal artery stenosis (CMS-HCC)   (+) Essential hypertension   (+) Hyperlipidemia LDL goal <100   (+) Renal artery stenosis (CMS-HCC)      Endocrine   (+) Type 2 diabetes mellitus with chronic kidney disease, without long-term current use of insulin (Multi)      Hematology   (+) Anemia       Clinical information reviewed:    Allergies                NPO Detail:  No data recorded     Physical Exam    Airway  Mallampati: II  TM distance: >3 FB  Neck ROM: full     Cardiovascular - normal exam  Rhythm: regular  Rate: normal     Dental - normal exam     Pulmonary    Abdominal   Abdomen: soft           Anesthesia Plan    History of general anesthesia?: yes  History of complications of general anesthesia?: no    ASA 2 - emergent     general     The patient is not a current smoker.  Patient was not previously instructed to abstain from smoking on day of procedure.  Patient did not smoke on day of procedure.    intravenous induction   Anesthetic plan and risks discussed with patient.    Plan discussed with CRNA.

## 2024-09-23 LAB
ALBUMIN SERPL BCP-MCNC: 3.3 G/DL (ref 3.4–5)
ALP SERPL-CCNC: 64 U/L (ref 33–136)
ALT SERPL W P-5'-P-CCNC: 18 U/L (ref 10–52)
ANION GAP SERPL CALC-SCNC: 15 MMOL/L (ref 10–20)
AST SERPL W P-5'-P-CCNC: 18 U/L (ref 9–39)
ATRIAL RATE: 84 BPM
BASOPHILS # BLD AUTO: 0.05 X10*3/UL (ref 0–0.1)
BASOPHILS NFR BLD AUTO: 0.4 %
BILIRUB SERPL-MCNC: 0.6 MG/DL (ref 0–1.2)
BUN SERPL-MCNC: 76 MG/DL (ref 6–23)
CALCIUM SERPL-MCNC: 7.7 MG/DL (ref 8.6–10.3)
CHLORIDE SERPL-SCNC: 109 MMOL/L (ref 98–107)
CO2 SERPL-SCNC: 19 MMOL/L (ref 21–32)
CREAT SERPL-MCNC: 2.24 MG/DL (ref 0.5–1.3)
CREAT UR-MCNC: 50.3 MG/DL (ref 20–370)
EGFRCR SERPLBLD CKD-EPI 2021: 32 ML/MIN/1.73M*2
EOSINOPHIL # BLD AUTO: 0.06 X10*3/UL (ref 0–0.7)
EOSINOPHIL NFR BLD AUTO: 0.5 %
ERYTHROCYTE [DISTWIDTH] IN BLOOD BY AUTOMATED COUNT: 11.9 % (ref 11.5–14.5)
GLUCOSE BLD MANUAL STRIP-MCNC: 155 MG/DL (ref 74–99)
GLUCOSE BLD MANUAL STRIP-MCNC: 158 MG/DL (ref 74–99)
GLUCOSE BLD MANUAL STRIP-MCNC: 161 MG/DL (ref 74–99)
GLUCOSE BLD MANUAL STRIP-MCNC: 183 MG/DL (ref 74–99)
GLUCOSE SERPL-MCNC: 173 MG/DL (ref 74–99)
HCT VFR BLD AUTO: 29.2 % (ref 41–52)
HGB BLD-MCNC: 10 G/DL (ref 13.5–17.5)
HOLD SPECIMEN: NORMAL
IMM GRANULOCYTES # BLD AUTO: 0.1 X10*3/UL (ref 0–0.7)
IMM GRANULOCYTES NFR BLD AUTO: 0.8 % (ref 0–0.9)
LYMPHOCYTES # BLD AUTO: 0.46 X10*3/UL (ref 1.2–4.8)
LYMPHOCYTES NFR BLD AUTO: 3.5 %
MCH RBC QN AUTO: 31.9 PG (ref 26–34)
MCHC RBC AUTO-ENTMCNC: 34.2 G/DL (ref 32–36)
MCV RBC AUTO: 93 FL (ref 80–100)
MONOCYTES # BLD AUTO: 0.82 X10*3/UL (ref 0.1–1)
MONOCYTES NFR BLD AUTO: 6.2 %
NEUTROPHILS # BLD AUTO: 11.84 X10*3/UL (ref 1.2–7.7)
NEUTROPHILS NFR BLD AUTO: 88.6 %
NRBC BLD-RTO: 0 /100 WBCS (ref 0–0)
P AXIS: 28 DEGREES
PLATELET # BLD AUTO: 215 X10*3/UL (ref 150–450)
POTASSIUM SERPL-SCNC: 4.3 MMOL/L (ref 3.5–5.3)
PR INTERVAL: 194 MS
PROT SERPL-MCNC: 6.2 G/DL (ref 6.4–8.2)
Q ONSET: 252 MS
QRS COUNT: 14 BEATS
QRS DURATION: 99 MS
QT INTERVAL: 403 MS
QTC CALCULATION(BAZETT): 474 MS
QTC FREDERICIA: 449 MS
R AXIS: -11 DEGREES
RBC # BLD AUTO: 3.13 X10*6/UL (ref 4.5–5.9)
SODIUM SERPL-SCNC: 139 MMOL/L (ref 136–145)
SODIUM UR-SCNC: 59 MMOL/L
SODIUM/CREAT UR-RTO: 117 MMOL/G CREAT
T AXIS: 31 DEGREES
T OFFSET: 454 MS
VENTRICULAR RATE: 83 BPM
WBC # BLD AUTO: 13.3 X10*3/UL (ref 4.4–11.3)

## 2024-09-23 PROCEDURE — 82947 ASSAY GLUCOSE BLOOD QUANT: CPT

## 2024-09-23 PROCEDURE — 2500000002 HC RX 250 W HCPCS SELF ADMINISTERED DRUGS (ALT 637 FOR MEDICARE OP, ALT 636 FOR OP/ED)

## 2024-09-23 PROCEDURE — 1210000001 HC SEMI-PRIVATE ROOM DAILY

## 2024-09-23 PROCEDURE — 2500000001 HC RX 250 WO HCPCS SELF ADMINISTERED DRUGS (ALT 637 FOR MEDICARE OP)

## 2024-09-23 PROCEDURE — 36415 COLL VENOUS BLD VENIPUNCTURE: CPT | Performed by: SURGERY

## 2024-09-23 PROCEDURE — 84075 ASSAY ALKALINE PHOSPHATASE: CPT

## 2024-09-23 PROCEDURE — 82542 COL CHROMOTOGRAPHY QUAL/QUAN: CPT | Performed by: REGISTERED NURSE

## 2024-09-23 PROCEDURE — 85025 COMPLETE CBC W/AUTO DIFF WBC: CPT

## 2024-09-23 PROCEDURE — 87040 BLOOD CULTURE FOR BACTERIA: CPT | Mod: PORLAB | Performed by: SURGERY

## 2024-09-23 PROCEDURE — 97162 PT EVAL MOD COMPLEX 30 MIN: CPT | Mod: GP

## 2024-09-23 PROCEDURE — 82570 ASSAY OF URINE CREATININE: CPT | Performed by: REGISTERED NURSE

## 2024-09-23 PROCEDURE — 2500000004 HC RX 250 GENERAL PHARMACY W/ HCPCS (ALT 636 FOR OP/ED)

## 2024-09-23 PROCEDURE — 99233 SBSQ HOSP IP/OBS HIGH 50: CPT | Performed by: PHYSICIAN ASSISTANT

## 2024-09-23 PROCEDURE — 36415 COLL VENOUS BLD VENIPUNCTURE: CPT

## 2024-09-23 PROCEDURE — 2500000004 HC RX 250 GENERAL PHARMACY W/ HCPCS (ALT 636 FOR OP/ED): Performed by: SURGERY

## 2024-09-23 PROCEDURE — 97165 OT EVAL LOW COMPLEX 30 MIN: CPT | Mod: GO | Performed by: OCCUPATIONAL THERAPIST

## 2024-09-23 RX ADMIN — PIPERACILLIN SODIUM AND TAZOBACTAM SODIUM 2.25 G: 2; .25 INJECTION, SOLUTION INTRAVENOUS at 22:46

## 2024-09-23 RX ADMIN — SODIUM CHLORIDE, POTASSIUM CHLORIDE, SODIUM LACTATE AND CALCIUM CHLORIDE 100 ML/HR: 600; 310; 30; 20 INJECTION, SOLUTION INTRAVENOUS at 20:59

## 2024-09-23 RX ADMIN — PIPERACILLIN SODIUM AND TAZOBACTAM SODIUM 2.25 G: 2; .25 INJECTION, SOLUTION INTRAVENOUS at 16:38

## 2024-09-23 RX ADMIN — METOPROLOL SUCCINATE 25 MG: 25 TABLET, EXTENDED RELEASE ORAL at 21:01

## 2024-09-23 RX ADMIN — AMLODIPINE BESYLATE 5 MG: 5 TABLET ORAL at 09:07

## 2024-09-23 RX ADMIN — PIPERACILLIN SODIUM AND TAZOBACTAM SODIUM 2.25 G: 2; .25 INJECTION, SOLUTION INTRAVENOUS at 10:41

## 2024-09-23 RX ADMIN — MORPHINE SULFATE 4 MG: 4 INJECTION INTRAVENOUS at 01:00

## 2024-09-23 RX ADMIN — EMPAGLIFLOZIN 10 MG: 10 TABLET, FILM COATED ORAL at 09:07

## 2024-09-23 RX ADMIN — MORPHINE SULFATE 2 MG: 2 INJECTION, SOLUTION INTRAMUSCULAR; INTRAVENOUS at 16:43

## 2024-09-23 RX ADMIN — ROSUVASTATIN 10 MG: 10 TABLET, FILM COATED ORAL at 20:59

## 2024-09-23 RX ADMIN — PIPERACILLIN SODIUM AND TAZOBACTAM SODIUM 2.25 G: 2; .25 INJECTION, SOLUTION INTRAVENOUS at 05:03

## 2024-09-23 RX ADMIN — MORPHINE SULFATE 2 MG: 2 INJECTION, SOLUTION INTRAMUSCULAR; INTRAVENOUS at 04:27

## 2024-09-23 ASSESSMENT — ENCOUNTER SYMPTOMS
CHEST TIGHTNESS: 0
CHILLS: 0
AGITATION: 0
PALPITATIONS: 0
FREQUENCY: 0
LIGHT-HEADEDNESS: 0
TROUBLE SWALLOWING: 0
JOINT SWELLING: 0
FEVER: 0
CONSTIPATION: 0
MYALGIAS: 0
NECK STIFFNESS: 0
BLOOD IN STOOL: 0
SHORTNESS OF BREATH: 0
CHILLS: 1
BRUISES/BLEEDS EASILY: 0
VOMITING: 0
BACK PAIN: 0
FACIAL SWELLING: 0
ABDOMINAL PAIN: 1
NUMBNESS: 0
HALLUCINATIONS: 0
WEAKNESS: 0
DIAPHORESIS: 0
DYSURIA: 0
FLANK PAIN: 0
DIZZINESS: 0
FATIGUE: 0
COUGH: 0
WOUND: 0
HEMATURIA: 0
DIARRHEA: 0
COLOR CHANGE: 0
EYE PAIN: 0
APPETITE CHANGE: 0
NAUSEA: 0
WHEEZING: 0
SORE THROAT: 0
HEADACHES: 0
POLYDIPSIA: 0

## 2024-09-23 ASSESSMENT — COGNITIVE AND FUNCTIONAL STATUS - GENERAL
HELP NEEDED FOR BATHING: A LITTLE
TOILETING: A LITTLE
DAILY ACTIVITIY SCORE: 22
STANDING UP FROM CHAIR USING ARMS: A LITTLE
DRESSING REGULAR UPPER BODY CLOTHING: A LITTLE
TURNING FROM BACK TO SIDE WHILE IN FLAT BAD: A LITTLE
DRESSING REGULAR LOWER BODY CLOTHING: A LITTLE
DRESSING REGULAR LOWER BODY CLOTHING: A LITTLE
STANDING UP FROM CHAIR USING ARMS: A LITTLE
WALKING IN HOSPITAL ROOM: A LITTLE
MOVING FROM LYING ON BACK TO SITTING ON SIDE OF FLAT BED WITH BEDRAILS: A LITTLE
TURNING FROM BACK TO SIDE WHILE IN FLAT BAD: A LITTLE
TOILETING: A LITTLE
HELP NEEDED FOR BATHING: A LITTLE
CLIMB 3 TO 5 STEPS WITH RAILING: A LITTLE
CLIMB 3 TO 5 STEPS WITH RAILING: A LITTLE
HELP NEEDED FOR BATHING: A LITTLE
MOVING TO AND FROM BED TO CHAIR: A LITTLE
PERSONAL GROOMING: A LITTLE
DRESSING REGULAR LOWER BODY CLOTHING: A LITTLE
MOBILITY SCORE: 23
MOBILITY SCORE: 18
MOVING TO AND FROM BED TO CHAIR: A LITTLE
MOBILITY SCORE: 18
WALKING IN HOSPITAL ROOM: A LITTLE
MOVING FROM LYING ON BACK TO SITTING ON SIDE OF FLAT BED WITH BEDRAILS: A LITTLE
CLIMB 3 TO 5 STEPS WITH RAILING: A LITTLE
DAILY ACTIVITIY SCORE: 19
DAILY ACTIVITIY SCORE: 21

## 2024-09-23 ASSESSMENT — PAIN SCALES - GENERAL
PAINLEVEL_OUTOF10: 6
PAINLEVEL_OUTOF10: 4
PAINLEVEL_OUTOF10: 2
PAINLEVEL_OUTOF10: 6
PAINLEVEL_OUTOF10: 4
PAINLEVEL_OUTOF10: 7

## 2024-09-23 ASSESSMENT — PAIN - FUNCTIONAL ASSESSMENT
PAIN_FUNCTIONAL_ASSESSMENT: 0-10

## 2024-09-23 ASSESSMENT — ACTIVITIES OF DAILY LIVING (ADL)
ADL_ASSISTANCE: INDEPENDENT
LACK_OF_TRANSPORTATION: NO

## 2024-09-23 ASSESSMENT — PAIN DESCRIPTION - LOCATION
LOCATION: ABDOMEN
LOCATION: ABDOMEN

## 2024-09-23 NOTE — CARE PLAN
The patient's goals for the shift include      The clinical goals for the shift include pain control and comfort      Problem: Pain - Adult  Goal: Verbalizes/displays adequate comfort level or baseline comfort level  Outcome: Progressing     Problem: Safety - Adult  Goal: Free from fall injury  Outcome: Progressing     Problem: Discharge Planning  Goal: Discharge to home or other facility with appropriate resources  Outcome: Progressing     Problem: Chronic Conditions and Co-morbidities  Goal: Patient's chronic conditions and co-morbidity symptoms are monitored and maintained or improved  Outcome: Progressing     Problem: Diabetes  Goal: Achieve decreasing blood glucose levels by end of shift  Outcome: Progressing  Goal: Increase stability of blood glucose readings by end of shift  Outcome: Progressing  Goal: Decrease in ketones present in urine by end of shift  Outcome: Progressing  Goal: Maintain electrolyte levels within acceptable range throughout shift  Outcome: Progressing  Goal: Maintain glucose levels >70mg/dl to <250mg/dl throughout shift  Outcome: Progressing  Goal: No changes in neurological exam by end of shift  Outcome: Progressing  Goal: Learn about and adhere to nutrition recommendations by end of shift  Outcome: Progressing  Goal: Vital signs within normal range for age by end of shift  Outcome: Progressing  Goal: Increase self care and/or family involovement by end of shift  Outcome: Progressing  Goal: Receive DSME education by end of shift  Outcome: Progressing     Problem: Pain  Goal: Takes deep breaths with improved pain control throughout the shift  Outcome: Progressing  Goal: Turns in bed with improved pain control throughout the shift  Outcome: Progressing  Goal: Walks with improved pain control throughout the shift  Outcome: Progressing  Goal: Performs ADL's with improved pain control throughout shift  Outcome: Progressing  Goal: Participates in PT with improved pain control throughout the  shift  Outcome: Progressing  Goal: Free from opioid side effects throughout the shift  Outcome: Progressing  Goal: Free from acute confusion related to pain meds throughout the shift  Outcome: Progressing

## 2024-09-23 NOTE — ANESTHESIA POSTPROCEDURE EVALUATION
Patient: Gilberto White    Procedure Summary       Date: 09/22/24 Room / Location: [facility] 01 / [facility]    Anesthesia Start: 1821 Anesthesia Stop:     Procedure: Appendectomy Laparoscopy, RUPTURED, CONVERTED TO OPEN Diagnosis:     Surgeons: Harsha Davis MD Responsible Provider: TIMOTHY Dennis    Anesthesia Type: general ASA Status: 2 - Emergent            Anesthesia Type: general    Vitals Value Taken Time   /67 09/22/24 2007   Temp 97.2 09/22/24 2007   Pulse 68 09/22/24 2007   Resp 18 09/22/24 2007   SpO2 100 % 09/22/24 2007   Vitals shown include unfiled device data.    Anesthesia Post Evaluation    Patient location during evaluation: PACU  Patient participation: complete - patient participated  Level of consciousness: awake  Pain score: 1  Pain management: adequate  Airway patency: patent  Cardiovascular status: acceptable  Respiratory status: acceptable and face mask  Hydration status: acceptable  Postoperative Nausea and Vomiting: none        No notable events documented.

## 2024-09-23 NOTE — CARE PLAN
Problem: Mobility  Goal: STG - Patient will ambulate  Description: 300 FT SBA  Outcome: Not Progressing  Goal: STG - Patient will ascend and descend six stairs  Description: RAILS SBA  Outcome: Not Progressing     Problem: PT Transfers  Goal: STG - Patient to transfer to and from sit to supine  Description: SBA USING LOG ROLLING HB FLAT NO RAILS  Outcome: Not Progressing  Goal: STG - Patient will transfer sit to and from stand  Description: SPLINTING INCISION SITE SBA  Outcome: Not Progressing

## 2024-09-23 NOTE — PROGRESS NOTES
Gilberto White is a 63 y.o. male admitted for Acute appendicitis with appendiceal abscess. Pharmacy reviewed the patient's [id number] medications and allergies for accuracy.    The list below reflects the PTA list prior to pharmacy medication history. A summary a changes to the PTA medication list has been listed below. Please review each medication in order reconciliation for additional clarification and justification.    Source of information: T2P     Medications added:    Medications modified:  VIT D3 5000 UNITS --> 1 every day  ONE A DAY FOR MEN --> 1 every day        Medications to be removed:    Medications of concern:      Prior to Admission Medications   Prescriptions Last Dose Informant Patient Reported? Taking?   Jardiance 10 mg   Yes No   Sig: Take 1 tablet (10 mg) by mouth once daily.   aflibercept (Eylea) 2 mg/0.05 mL intra-ocular injection More than a month  Yes No   Si.05 mL (2 mg) by intravitreal route every 30 (thirty) days.   amLODIPine (Norvasc) 5 mg tablet 2024  No Yes   Sig: TAKE 1 TABLET BY MOUTH EVERY DAY   Patient taking differently: Take 0.5 tablets (2.5 mg) by mouth once daily.   chlorthalidone (Hygroton) 25 mg tablet 2024  Yes Yes   Sig: Take 1 tablet (25 mg) by mouth once daily.   cholecalciferol (Vitamin D-3) 125 MCG (5000 UT) capsule 2024  Yes Yes   Sig: Vitamin D3 125 MCG (5000 UT) Oral Capsule   Refills: 0       Active   losartan (Cozaar) 25 mg tablet 2024  Yes Yes   Sig: Take 1 tablet (25 mg) by mouth once every 24 hours.   metoprolol succinate XL (Toprol-XL) 25 mg 24 hr tablet 2024  Yes Yes   Sig: Take 1 tablet (25 mg) by mouth once daily.   multivit-minerals/FA/lycopene (ONE DAILY FOR MEN ORAL) 2024  Yes Yes   rosuvastatin (Crestor) 10 mg tablet 2024  No Yes   Sig: Take 1 tablet (10 mg) by mouth once daily.      Facility-Administered Medications: None       MIGUE OCAMPO

## 2024-09-23 NOTE — CARE PLAN
Problem: Pain - Adult  Goal: Verbalizes/displays adequate comfort level or baseline comfort level  Outcome: Progressing     Problem: Safety - Adult  Goal: Free from fall injury  Outcome: Progressing     Problem: Discharge Planning  Goal: Discharge to home or other facility with appropriate resources  Outcome: Progressing     Problem: Chronic Conditions and Co-morbidities  Goal: Patient's chronic conditions and co-morbidity symptoms are monitored and maintained or improved  Outcome: Progressing     Problem: Diabetes  Goal: Achieve decreasing blood glucose levels by end of shift  Outcome: Progressing  Goal: Increase stability of blood glucose readings by end of shift  Outcome: Progressing  Goal: Decrease in ketones present in urine by end of shift  Outcome: Progressing  Goal: Maintain electrolyte levels within acceptable range throughout shift  Outcome: Progressing  Goal: Maintain glucose levels >70mg/dl to <250mg/dl throughout shift  Outcome: Progressing  Goal: No changes in neurological exam by end of shift  Outcome: Progressing  Goal: Learn about and adhere to nutrition recommendations by end of shift  Outcome: Progressing  Goal: Vital signs within normal range for age by end of shift  Outcome: Progressing  Goal: Increase self care and/or family involovement by end of shift  Outcome: Progressing  Goal: Receive DSME education by end of shift  Outcome: Progressing     Problem: Pain  Goal: Takes deep breaths with improved pain control throughout the shift  Outcome: Progressing  Goal: Turns in bed with improved pain control throughout the shift  Outcome: Progressing  Goal: Walks with improved pain control throughout the shift  Outcome: Progressing  Goal: Performs ADL's with improved pain control throughout shift  Outcome: Progressing  Goal: Participates in PT with improved pain control throughout the shift  Outcome: Progressing  Goal: Free from opioid side effects throughout the shift  Outcome: Progressing  Goal:  Free from acute confusion related to pain meds throughout the shift  Outcome: Progressing   The patient's goals for the shift include      The clinical goals for the shift include pt will have decreased BUN and Creatinine this shift.

## 2024-09-23 NOTE — PROGRESS NOTES
09/23/24 1348   Discharge Planning   Living Arrangements Children;Spouse/significant other   Support Systems Children;Spouse/significant other   Assistance Needed Independent   Type of Residence Private residence   Home or Post Acute Services None   Does the patient need discharge transport arranged? No   Financial Resource Strain   How hard is it for you to pay for the very basics like food, housing, medical care, and heating? Not hard   Housing Stability   In the last 12 months, was there a time when you were not able to pay the mortgage or rent on time? N   At any time in the past 12 months, were you homeless or living in a shelter (including now)? N   Transportation Needs   In the past 12 months, has lack of transportation kept you from medical appointments or from getting medications? no   In the past 12 months, has lack of transportation kept you from meetings, work, or from getting things needed for daily living? No     PCP is Federico Katz MD. Patient is from home with his wife and his son. Patient is independent with ambulation, self care, driving, shopping, and meals.  Patient prefers to return home with no needs upon discharge. [facility] will continue to follow for needs if they arise.

## 2024-09-23 NOTE — CONSULTS
Reason For Consult    MARICARMEN on CKD stage III       History Of Present Illness  Gilberto White is a 63 y.o. male presenting with  63-year-old male with history of diabetes, kidney stones, chronic kidney disease, hypertension complains of right flank and abdominal pain for 1 week. Says the pain has been intermittent primarily with movement. Patient states that he was lifting objects when the pain began. Pain was improving with Biofreeze and he then helped lift a 200 pound individual. Patient has a history of kidney stones and says this feels somewhat similar to the pain. He is concerned because the pain has not gone away. Denies dysuria polyuria hematuria nausea vomiting diarrhea constipation.  .    Patient was after presentation found to have perforated   Appendicitis.  Underwent CT of the abdomen and pelvis.  Surgery was consulted.    Creatinine is 1.5.  Close to baseline.  CKD stage IIIa A3.  History of hypertensive chronic kidney disease and nephrolithiasis history of pure hypercholesterolemia.  Has been on SGLT2 inhibitor therapy.  He has had a renal artery duplex in the past that had showed narrowing but renal angiogram did not show any blockage.  Known history of pseudoaneurysm of iliac artery.  Given his nephrolithiasis was maintained on chlorthalidone.  For comparison patient had creatinine of 1.8 in July 2024 and 1.57 in March or 2024.    Past Medical History  He has a past medical history of Anemia (02/03/2023), Chronic arthralgias of knees and hips (02/03/2023), Essential hypertension (02/03/2023), Hyperbilirubinemia (02/03/2023), Hypertension, and Low serum vitamin D (02/03/2023).    Surgical History  He has a past surgical history that includes Other surgical history (11/24/2021) and Other surgical history (11/24/2021).     Social History  He reports that he has never smoked. He has never used smokeless tobacco. He reports that he does not drink alcohol and does not use drugs.    Family History  Family  History   Problem Relation Name Age of Onset    Stroke Mother      Diabetes Mother      Breast cancer Mother      Diabetes type II Mother          with other skin complication    Diabetes Father      Myasthenia gravis Father      Diabetes type II Father      Stroke Sibling     No family hx of ESRD     Allergies  Patient has no known allergies.    Review of Systems  .Review of Systems   Constitutional:  Positive for chills. Negative for fatigue and fever.   HENT:  Negative for congestion.    Eyes:  Negative for visual disturbance.   Respiratory:  Negative for cough and shortness of breath.    Cardiovascular:  Negative for chest pain and leg swelling.   Gastrointestinal:  Positive for abdominal pain. Negative for blood in stool, diarrhea and nausea.   Endocrine: Negative for polydipsia and polyuria.   Genitourinary:  Negative for dysuria, frequency, hematuria and urgency.   Musculoskeletal:  Negative for myalgias and neck stiffness.   Skin:  Negative for color change, pallor and rash.   Allergic/Immunologic: Negative for immunocompromised state.   Neurological:  Negative for dizziness and light-headedness.   Psychiatric/Behavioral:  Negative for agitation.          Physical Exam  .Physical Exam  Constitutional:       Appearance: Normal appearance. He is not toxic-appearing.   HENT:      Head: Normocephalic and atraumatic.      Right Ear: External ear normal.      Mouth/Throat:      Mouth: Mucous membranes are moist.      Pharynx: Oropharynx is clear.   Eyes:      General: No scleral icterus.     Conjunctiva/sclera: Conjunctivae normal.      Pupils: Pupils are equal, round, and reactive to light.   Neck:      Vascular: No carotid bruit.   Cardiovascular:      Rate and Rhythm: Normal rate and regular rhythm.      Pulses: Normal pulses.      Heart sounds: Normal heart sounds.   Pulmonary:      Effort: Pulmonary effort is normal.      Breath sounds: Normal breath sounds. No wheezing or rales.   Abdominal:      Palpations:  "Abdomen is soft.      Tenderness: There is abdominal tenderness.   Musculoskeletal:         General: No swelling.      Cervical back: No rigidity.      Right lower leg: No edema.      Left lower leg: No edema.   Neurological:      General: No focal deficit present.      Mental Status: He is alert and oriented to person, place, and time. Mental status is at baseline.   Psychiatric:         Mood and Affect: Mood normal.         Behavior: Behavior normal.         Thought Content: Thought content normal.         Judgment: Judgment normal.                I&O 24HR    Intake/Output Summary (Last 24 hours) at 9/23/2024 1304  Last data filed at 9/23/2024 1122  Gross per 24 hour   Intake 3403.33 ml   Output 1758 ml   Net 1645.33 ml       Vitals 24HR  Heart Rate:  [66-85]   Temp:  [36.2 °C (97.2 °F)-37.4 °C (99.4 °F)]   Resp:  [16-28]   BP: (117-158)/(59-86)   Height:  [172.7 cm (5' 8\")]   Weight:  [80.7 kg (178 lb)]   SpO2:  [90 %-100 %]   .  Vitals:    09/22/24 2350 09/23/24 0150 09/23/24 0544 09/23/24 0928   BP: 124/61 119/64 125/62 137/65   BP Location: Left arm Left arm Left arm Left arm   Patient Position: Lying Lying Lying Lying   Pulse: 85 77 78 77   Resp: 18 19 17 17   Temp: 37.3 °C (99.1 °F) 36.8 °C (98.3 °F) 37.4 °C (99.4 °F) 36.8 °C (98.3 °F)   TempSrc: Temporal Temporal Temporal Temporal   SpO2: 94% 95% 95% 94%   Weight:       Height:              Relevant Results  .  Results from last 7 days   Lab Units 09/23/24  0452 09/22/24  1444   SODIUM mmol/L 139 137   POTASSIUM mmol/L 4.3 4.0   CHLORIDE mmol/L 109* 106   CO2 mmol/L 19* 23   BUN mg/dL 76* 89*   CREATININE mg/dL 2.24* 2.27*   EGFR mL/min/1.73m*2 32* 32*   GLUCOSE mg/dL 173* 120*   CALCIUM mg/dL 7.7* 8.2*      .  Results from last 7 days   Lab Units 09/23/24  0452 09/22/24  1444   WBC AUTO x10*3/uL 13.3* 9.3   HEMOGLOBIN g/dL 10.0* 10.2*   HEMATOCRIT % 29.2* 30.1*   PLATELETS AUTO x10*3/uL 215 209         Assessment/Plan       MARICARMEN/ATN 2/2 Acute perforated " appendicitis with abscess s/p appendectomy  (N17.0)  Azotemia (R79.89)  Metabolic acidosis E87.20  CKD3a  N18.31  Diabetes type 2   HTN  Hypocalcemia   Anemia in the setting CKD3a  HLD  Leukocytosis with acute appendicitis and appendiceal abscess K35.33    Recommendations   Scr about the same, Acute kidney injury consistent with acute tubular necrosis baseline creatinine 1.5-1.8 peak creatinine 2.24  Hypertension with hypertensive chronic kidney disease  Agree with the choice of buffered IV fluids  Hold Losartan and chlorthalidone  Urine studies ordered   UA + for grandular casts, + hyaline, + rbc 11-20  C/w IVF  at this time, monitor for sx/s of volume overload  Azotemia will improve as MARICARMEN resolves  Acidosis will improve as MARICARMEN resolves, if acidosis worsen will consider bicarb tablets   Your rx for appendicitis   Okay for blood transfusions for Hgb <7, check iron  studies   MARICARMEN during hospitalization is associated with high in-hospital and long-term mortality.    Assessment & Plan  Acute appendicitis with appendiceal abscess

## 2024-09-23 NOTE — PROGRESS NOTES
Gilberto White is a 63 y.o. male on day 1 of admission presenting with Acute appendicitis with appendiceal abscess.      Subjective   Gilberto White is a 63 y.o. male with past medical history s/f HTN, HLD, bilateral renal artery stenosis, T2DM, CKD stage IIIa who presented 9/22/24 with RUQ/R flank pain x1 week, found to have acute appendicitis (CT abdomen pelvis shows markedly dilated and thick-walled appendix containing radiopaque material approximately suspect appendicolith with associated periappendiceal fat stranding with no clear abscess or perforation) status post appendectomy laparoscopy on 9/22/2024. Medicine was consulted for medical management.    Op cx- rare gram negative bacilli    9/23/2024: No acute events overnight. Vitals stable. Pain 2-7/10. Labs largely unchanged. Cr 2.24 (2.27) (baseline ~1.5). Has leukocytosis 13.3 today. Hgb stable 10.0         Review of Systems   Constitutional:  Negative for appetite change, chills, diaphoresis, fatigue and fever.   HENT:  Negative for congestion, ear pain, facial swelling, hearing loss, nosebleeds, sore throat, tinnitus and trouble swallowing.    Eyes:  Negative for pain.   Respiratory:  Negative for cough, chest tightness, shortness of breath and wheezing.    Cardiovascular:  Negative for chest pain, palpitations and leg swelling.   Gastrointestinal:  Positive for abdominal pain. Negative for blood in stool, constipation, diarrhea, nausea and vomiting.   Genitourinary:  Negative for dysuria, flank pain, frequency, hematuria and urgency.   Musculoskeletal:  Negative for back pain and joint swelling.   Skin:  Negative for rash and wound.   Neurological:  Negative for dizziness, syncope, weakness, light-headedness, numbness and headaches.   Hematological:  Does not bruise/bleed easily.   Psychiatric/Behavioral:  Negative for behavioral problems, hallucinations and suicidal ideas.           Objective     Last Recorded Vitals  /65 (BP Location: Left  arm, Patient Position: Lying)   Pulse 77   Temp 36.8 °C (98.3 °F) (Temporal)   Resp 17   Wt 80.7 kg (178 lb)   SpO2 94%     Image Results  Electrocardiogram, 12-lead PRN ACS symptoms    Result Date: 9/23/2024  Sinus rhythm    CT abdomen pelvis wo IV contrast    Result Date: 9/22/2024  Interpreted By:  Anna Stephens, STUDY: CT ABDOMEN PELVIS WO IV CONTRAST;  9/22/2024 3:08 pm   INDICATION: Signs/Symptoms:right flank and rlq pain, tender, hx kidney stones.   COMPARISON: 09/05/2023   ACCESSION NUMBER(S): [id number]   ORDERING CLINICIAN: ANGELINA MCFADDEN   TECHNIQUE: CT of the abdomen and pelvis was performed. Contiguous axial images were obtained at 3 mm slice thickness through the abdomen and pelvis. Coronal and sagittal reconstructions at 3 mm slice thickness were performed.  No intravenous or oral contrast agents were administered.   FINDINGS: Please note that the evaluation of vessels, lymph nodes and organs is limited without intravenous contrast.   LOWER CHEST: There are bands of atelectasis in the lung bases and right middle lobe. No pleural effusion. Gynecomastia noted.   ABDOMEN:   LIVER: Normal size, contour, and density. No focal lesion evident.   GALLBLADDER: Normal-size. Hazy increased density in the dependent portion of the gallbladder, likely sludge. No definite radiopaque gallstone.   BILE DUCTS: Normal caliber.   PANCREAS: The pancreas appears within normal limits, no evidence of pancreatitis or mass.   SPLEEN: Normal size.   ADRENAL GLANDS: Within normal limits.   KIDNEYS AND URETERS: Bilateral perinephric edema. No hydronephrosis. Bilateral nonobstructing calculi are present. The largest is in the right renal pelvis, triangular shaped, 1.3 cm. No ureteral stone. No focal lesion evident.   PELVIS:   BLADDER: Within normal limits.   REPRODUCTIVE ORGANS: Mild prostate enlargement.   BOWEL: The stomach is unremarkable. The small bowel is normal in caliber without evidence of focal wall thickening or  obstruction. There is no evidence of focal wall thickening or dilatation of the large bowel. Markedly abnormal appendix is diffusely thickened and dilated, 1.8 cm diameter, with significant surrounding fat stranding, extends along right paracolic gutter up to near the gallbladder fossa. There is calcific density column of material in the proximal appendix which may represent 1 or more appendicoliths although difficult to measure. Significant fat stranding present around the appendix, there is no definite fluid collection or abscess. No evidence of perforation.   VESSELS: Normal caliber aorta. Atherosclerosis present.   PERITONEUM/RETROPERITONEUM/LYMPH NODES: Retroperitoneal edema bilateral perinephric space and along the paracolic gutters. Edema surrounds the dilated appendix, however no significant free fluid. No pneumoperitoneum. No adenopathy.   BONES AND ABDOMINAL WALL: No evidence of acute fracture. No suspicious osseous lesions are identified.Mild degenerative changes in the spine and hips. Small fat containing left inguinal hernia.       1.  Acute appendicitis. There is a markedly dilated (1.8 cm) and thick-walled appendix containing some radiopaque material proximally, suspect there is probably an appendicolith within although difficult to measure. Periappendiceal fat stranding is present, however no definite abscess or evidence of perforation. 2. Bilateral nonobstructing nephrolithiasis. Perinephric edema is symmetric and nonspecific. Correlate for any clinical evidence of UTI. 3. Ancillary findings as above.     Signed by: Anna Stephens 9/22/2024 4:09 PM Dictation workstation:   [id number]       Lab Results  Results for orders placed or performed during the hospital encounter of 09/22/24 (from the past 24 hour(s))   Extra Urine Gray Tube   Result Value Ref Range    Extra Tube Hold for add-ons.    CBC and Auto Differential   Result Value Ref Range    WBC 9.3 4.4 - 11.3 x10*3/uL    nRBC 0.0 0.0 - 0.0 /100 WBCs     RBC 3.18 (L) 4.50 - 5.90 x10*6/uL    Hemoglobin 10.2 (L) 13.5 - 17.5 g/dL    Hematocrit 30.1 (L) 41.0 - 52.0 %    MCV 95 80 - 100 fL    MCH 32.1 26.0 - 34.0 pg    MCHC 33.9 32.0 - 36.0 g/dL    RDW 12.0 11.5 - 14.5 %    Platelets 209 150 - 450 x10*3/uL    Immature Granulocytes %, Automated 0.6 0.0 - 0.9 %    Immature Granulocytes Absolute, Automated 0.06 0.00 - 0.70 x10*3/uL   Comprehensive metabolic panel   Result Value Ref Range    Glucose 120 (H) 74 - 99 mg/dL    Sodium 137 136 - 145 mmol/L    Potassium 4.0 3.5 - 5.3 mmol/L    Chloride 106 98 - 107 mmol/L    Bicarbonate 23 21 - 32 mmol/L    Anion Gap 12 10 - 20 mmol/L    Urea Nitrogen 89 (H) 6 - 23 mg/dL    Creatinine 2.27 (H) 0.50 - 1.30 mg/dL    eGFR 32 (L) >60 mL/min/1.73m*2    Calcium 8.2 (L) 8.6 - 10.3 mg/dL    Albumin 3.9 3.4 - 5.0 g/dL    Alkaline Phosphatase 79 33 - 136 U/L    Total Protein 7.3 6.4 - 8.2 g/dL    AST 26 9 - 39 U/L    Bilirubin, Total 0.7 0.0 - 1.2 mg/dL    ALT 25 10 - 52 U/L   Lipase   Result Value Ref Range    Lipase 32 9 - 82 U/L   Manual Differential   Result Value Ref Range    Neutrophils %, Manual 80.0 40.0 - 80.0 %    Lymphocytes %, Manual 8.0 13.0 - 44.0 %    Monocytes %, Manual 5.0 2.0 - 10.0 %    Eosinophils %, Manual 1.0 0.0 - 6.0 %    Basophils %, Manual 1.0 0.0 - 2.0 %    Atypical Lymphocytes %, Manual 5.0 0.0 - 2.0 %    Seg Neutrophils Absolute, Manual 7.44 (H) 1.20 - 7.00 x10*3/uL    Lymphocytes Absolute, Manual 0.74 (L) 1.20 - 4.80 x10*3/uL    Monocytes Absolute, Manual 0.47 0.10 - 1.00 x10*3/uL    Eosinophils Absolute, Manual 0.09 0.00 - 0.70 x10*3/uL    Basophils Absolute, Manual 0.09 0.00 - 0.10 x10*3/uL    Atypical Lymphs Absolute, Manual 0.47 0.00 - 0.50 x10*3/uL    Total Cells Counted 100     RBC Morphology No significant RBC morphology present    Urinalysis with Reflex Culture and Microscopic   Result Value Ref Range    Color, Urine Light-Yellow Light-Yellow, Yellow, Dark-Yellow    Appearance, Urine Clear Clear     Specific Gravity, Urine 1.011 1.005 - 1.035    pH, Urine 5.0 5.0, 5.5, 6.0, 6.5, 7.0, 7.5, 8.0    Protein, Urine 20 (TRACE) NEGATIVE, 10 (TRACE), 20 (TRACE) mg/dL    Glucose, Urine OVER (4+) (A) Normal mg/dL    Blood, Urine 1.0 (3+) (A) NEGATIVE    Ketones, Urine NEGATIVE NEGATIVE mg/dL    Bilirubin, Urine NEGATIVE NEGATIVE    Urobilinogen, Urine Normal Normal mg/dL    Nitrite, Urine NEGATIVE NEGATIVE    Leukocyte Esterase, Urine NEGATIVE NEGATIVE   Urinalysis Microscopic   Result Value Ref Range    WBC, Urine 1-5 1-5, NONE /HPF    RBC, Urine 11-20 (A) NONE, 1-2, 3-5 /HPF    Squamous Epithelial Cells, Urine 1-9 (SPARSE) Reference range not established. /HPF    Mucus, Urine FEW Reference range not established. /LPF    Hyaline Casts, Urine 2+ (A) NONE /LPF    Fine Granular Casts, Urine 2+ (A) NONE /LPF   Tissue/Wound Culture/Smear    Specimen: Other (specify in comments); Tissue/Biopsy   Result Value Ref Range    Gram Stain (2+) Few Polymorphonuclear leukocytes (A)     Gram Stain (1+) Rare Gram negative bacilli (A)    Electrocardiogram, 12-lead PRN ACS symptoms   Result Value Ref Range    Ventricular Rate 83 BPM    Atrial Rate 84 BPM    VA Interval 194 ms    QRS Duration 99 ms    QT Interval 403 ms    QTC Calculation(Bazett) 474 ms    P Axis 28 degrees    R Axis -11 degrees    T Axis 31 degrees    QRS Count 14 beats    Q Onset 252 ms    T Offset 454 ms    QTC Fredericia 449 ms   CBC and Auto Differential   Result Value Ref Range    WBC 13.3 (H) 4.4 - 11.3 x10*3/uL    nRBC 0.0 0.0 - 0.0 /100 WBCs    RBC 3.13 (L) 4.50 - 5.90 x10*6/uL    Hemoglobin 10.0 (L) 13.5 - 17.5 g/dL    Hematocrit 29.2 (L) 41.0 - 52.0 %    MCV 93 80 - 100 fL    MCH 31.9 26.0 - 34.0 pg    MCHC 34.2 32.0 - 36.0 g/dL    RDW 11.9 11.5 - 14.5 %    Platelets 215 150 - 450 x10*3/uL    Neutrophils % 88.6 40.0 - 80.0 %    Immature Granulocytes %, Automated 0.8 0.0 - 0.9 %    Lymphocytes % 3.5 13.0 - 44.0 %    Monocytes % 6.2 2.0 - 10.0 %    Eosinophils %  0.5 0.0 - 6.0 %    Basophils % 0.4 0.0 - 2.0 %    Neutrophils Absolute 11.84 (H) 1.20 - 7.70 x10*3/uL    Immature Granulocytes Absolute, Automated 0.10 0.00 - 0.70 x10*3/uL    Lymphocytes Absolute 0.46 (L) 1.20 - 4.80 x10*3/uL    Monocytes Absolute 0.82 0.10 - 1.00 x10*3/uL    Eosinophils Absolute 0.06 0.00 - 0.70 x10*3/uL    Basophils Absolute 0.05 0.00 - 0.10 x10*3/uL   Comprehensive Metabolic Panel   Result Value Ref Range    Glucose 173 (H) 74 - 99 mg/dL    Sodium 139 136 - 145 mmol/L    Potassium 4.3 3.5 - 5.3 mmol/L    Chloride 109 (H) 98 - 107 mmol/L    Bicarbonate 19 (L) 21 - 32 mmol/L    Anion Gap 15 10 - 20 mmol/L    Urea Nitrogen 76 (H) 6 - 23 mg/dL    Creatinine 2.24 (H) 0.50 - 1.30 mg/dL    eGFR 32 (L) >60 mL/min/1.73m*2    Calcium 7.7 (L) 8.6 - 10.3 mg/dL    Albumin 3.3 (L) 3.4 - 5.0 g/dL    Alkaline Phosphatase 64 33 - 136 U/L    Total Protein 6.2 (L) 6.4 - 8.2 g/dL    AST 18 9 - 39 U/L    Bilirubin, Total 0.6 0.0 - 1.2 mg/dL    ALT 18 10 - 52 U/L   POCT GLUCOSE   Result Value Ref Range    POCT Glucose 155 (H) 74 - 99 mg/dL        Medications  Scheduled medications:  amLODIPine, 5 mg, oral, Daily  [Held by provider] chlorthalidone, 25 mg, oral, Daily  empagliflozin, 10 mg, oral, Daily  [Held by provider] losartan, 25 mg, oral, Daily  metoprolol succinate XL, 25 mg, oral, Daily  piperacillin-tazobactam, 2.25 g, intravenous, q6h  rosuvastatin, 10 mg, oral, Nightly      Continuous medications:  lactated Ringer's, 100 mL/hr, Last Rate: 100 mL/hr (09/23/24 0912)      PRN medications:  PRN medications: acetaminophen, morphine, morphine, ondansetron, promethazine     Physical Exam  Constitutional:       General: He is not in acute distress.     Appearance: Normal appearance.   HENT:      Head: Normocephalic and atraumatic.      Right Ear: External ear normal.      Left Ear: External ear normal.      Nose: Nose normal.      Mouth/Throat:      Mouth: Mucous membranes are moist.      Pharynx: Oropharynx is  clear.   Eyes:      Extraocular Movements: Extraocular movements intact.      Conjunctiva/sclera: Conjunctivae normal.      Pupils: Pupils are equal, round, and reactive to light.   Cardiovascular:      Rate and Rhythm: Normal rate and regular rhythm.      Pulses: Normal pulses.      Heart sounds: Normal heart sounds.   Pulmonary:      Effort: Pulmonary effort is normal. No respiratory distress.      Breath sounds: Normal breath sounds. No wheezing, rhonchi or rales.   Abdominal:      General: Bowel sounds are normal.      Palpations: Abdomen is soft.      Tenderness: There is abdominal tenderness. There is no right CVA tenderness, left CVA tenderness, guarding or rebound.      Comments: LLQ drain with slight bloody output   Musculoskeletal:         General: No swelling. Normal range of motion.      Cervical back: Normal range of motion and neck supple.   Skin:     General: Skin is warm and dry.      Capillary Refill: Capillary refill takes less than 2 seconds.      Findings: No lesion or rash.   Neurological:      General: No focal deficit present.      Mental Status: He is alert and oriented to person, place, and time. Mental status is at baseline.   Psychiatric:         Mood and Affect: Mood normal.         Behavior: Behavior normal.                  Assessment/Plan      Acute perforated appendicitis with abscesss/p laparoscopic converted to open appendectomy 9/22/2024  -Managed by primary care service team  -Continue on IV Zosyn  -Diet per general surgery, IVF hydration until able to maintain own hydration with PO intake   -Pain meds, antiemetics as needed     MARICARMEN on CKD stage IIIa  -BUN/creatinine 89/2.27 on admission, baseline creatinine 1.50-1.60  -Hold losartan, chlorthalidone  -Continue  mL/h  -Continue to trend while admitted  -Nephrology consult     HTN  -continue on home amlodipine, metoprolol and losartan  -Hold losartan, chlorthalidone given his MARICARMEN     HLD  -continue on statin     DM-II   -A1C  of 6.0 on 8/20/24  -continue home jardiance     Anemia  -Hgb 10.2, stable and at near baseline  -Transfuse RBC for hemoglobin less than 7 or greater than 7 with signs of bleeding and hemodynamic instability     Code Status: Full Code     DVT ppx: Per primary      Please see orders for more complete plan    Thank you for involving us in the care of this very pleasant patient. We will follow along with you while they remain admitted. Please contact Hospitalist service if any clarification needed.     Zenaida Dillon PA-C

## 2024-09-23 NOTE — PROGRESS NOTES
Medication Adjustment    The following medication(s) was/were adjusted for Gilberto White per protocol/policy due to altered renal function.    Medication(s) adjusted:   Zosyn 3.375g q 8 hrs changed to 2.25g q 6 hrs for a CrCl of 32.6 ML/min for the indication of abdominal infection.    Maria A Phillips, PharmD

## 2024-09-23 NOTE — PROGRESS NOTES
"Occupational Therapy    Evaluation    Patient Name: Gilberto White  MRN: [id number]  Today's Date: 9/23/2024  Time Calculation  Start Time: 1345  Stop Time: 1405  Time Calculation (min): 20 min  3331/3331-A    Assessment  IP OT Assessment  OT Assessment: CGA amb., assist with LB ADLs  Prognosis: Excellent  Medical Staff Made Aware: Yes  End of Session Communication: Bedside nurse  End of Session Patient Position: Up in chair, Alarm on       09/23/24 1345   Inpatient Plan   Treatment Interventions ADL retraining;Endurance training   OT Frequency 4 times per week   OT - OK to Discharge Yes  ((when medically approp.))   OT Discharge Recommendations No OT needed after discharge   OT Recommended Transfer Status Stand by assist     Subjective     Current Problem:  1. Acute appendicitis with appendiceal abscess        2. Acute appendicitis with localized peritonitis, without perforation or abscess, unspecified whether gangrene present  CANCELED: Case Request Operating Room: Appendectomy Laparoscopy    CANCELED: Case Request Operating Room: Appendectomy Laparoscopy      3. Acute appendicitis with perforation and localized peritonitis, unspecified whether abscess present, unspecified whether gangrene present  Surgical Pathology Exam    Surgical Pathology Exam          General:  General  Reason for Referral: appendicitis s/p appendectomy 9/22  Referred By: Ryan  Past Medical History Relevant to Rehab: Hx. of arthralgias hips and knees, anemia, MARICARMEN, DM  Family/Caregiver Present: Yes (wife)  Co-Treatment: PT  Co-Treatment Reason: to maximize safe mobility  Patient Position Received: Bed, 3 rail up, Alarm on  General Comment: pt. awoken, cooperative, L bulb drain    Precautions:  Precautions Comment: L bulb drain , lower abdom. incision    Vital Signs:see nurses notes        Pain:  Pain Assessment  Pain Assessment:  (states R lower abdominal area \"just sore\")    Objective     Cognition:  Overall Cognitive Status: Within " Functional Limits         Home Living:  Type of Home: House  Lives With: Spouse  Home Adaptive Equipment: None  Home Layout: Multi-level (split-level)  Home Access:  (1 GILBERTO)  Bathroom Shower/Tub: Walk-in shower     Prior Function:  Level of [place]: Independent with ADLs and functional transfers, Independent with homemaking with ambulation  ADL Assistance: Independent  Homemaking Assistance: Independent  Ambulatory Assistance: Independent  Vocational:  (works part-time at school)  Leisure: walks for miles/day  Prior Function Comments: sleeps in recliner    IADL History:  Homemaking Responsibilities: Yes  Meal Prep Responsibility: Primary  Laundry Responsibility: Primary  Cleaning Responsibility: Primary  Current License:  ([employer])    ADL:  LE Dressing Assistance: Moderate  Toileting Assistance with Device:  (CGA amb. with IV)    Activity Tolerance:  Endurance: Decreased tolerance for upright activites    Bed Mobility/Transfers:   Bed Mobility  Bed Mobility: Yes (Min.A supine-to-sit)       Ambulation/Gait Training:  Functional Mobility  Functional Mobility Performed: Yes (CGA amb. with IV)       Standing Balance:  Dynamic Standing Balance  Dynamic Standing-Balance Support:  (SBA)    Vision: Vision - Basic Assessment  Current Vision: No visual deficits     Sensation:  Light Touch: No apparent deficits    Strength:  Strength Comments: UEs WNL    Perception:  Inattention/Neglect: Appears intact    Coordination:  Movements are Fluid and Coordinated: Yes     Hand Function:  Hand Function  Gross Grasp: Functional      Outcome Measures: [facility] Daily Activity  Putting on and taking off regular lower body clothing: A little  Bathing (including washing, rinsing, drying): A little  Putting on and taking off regular upper body clothing: None  Toileting, which includes using toilet, bedpan or urinal: A little  Taking care of personal grooming such as brushing teeth: None  Eating Meals: None  Daily Activity - Total Score: 21                        EDUCATION:     Education Documentation  Precautions, taught by Barby Boyer OT at 9/23/2024  2:41 PM.  Learner: Patient  Readiness: Acceptance  Method: Demonstration  Response: Demonstrated Understanding  Comment: use of call light for toileting    Education Comments  No comments found.        Goals:   Encounter Problems       Encounter Problems (Active)       ADLs       SBA LB dressing  (Progressing)       Start:  09/23/24    Expected End:  09/30/24               MOBILITY       Supervised func. mobility  (Progressing)       Start:  09/23/24    Expected End:  09/30/24

## 2024-09-23 NOTE — CONSULTS
"Inpatient consult to Medicine  Consult performed by: Jaqueline Villalta PA-C  Consult ordered by: Harsha Davis MD  Reason for consult: Medical management        [facility] General Medicine Consultation Note    ASSESSMENT and PLAN:     Gilberto White is a 63 y.o. male with past medical history s/f HTN, HLD, bilateral renal artery stenosis, T2DM, CKD stage IIIa who presented with acute perforated appendicitis with abscess status post appendectomy laparoscopy on 9/22/2024. Medicine was consulted for medical management.     acute perforated appendicitis with abscess status post appendectomy laparoscopy on 9/22/2024  -Managed by primary care service team  -Continue on IV Zosyn  -NPO; continue LR infusion of 100 cc  -Pain meds, antiemetics as needed    MARICARMEN on CKD stage IIIa  -BUN/creatinine 89/2.27 on admission, baseline creatinine 1.50-1.60  -Hold losartan, chlorthalidone  -Received NS 1 L in ED, continue  mL/h  -Continue to trend while admitted  -Nephrology consult    HTN  -continue on home amlodipine, metoprolol and losartan  -Hold losartan, chlorthalidone given his MARICARMEN    HLD  -continue on statin    DM-II   -A1C of 6.0 on 8/20/24  -continue home jardiance    Anemia  -Hgb 10.2, stable and at near baseline  -Transfuse RBC for hemoglobin less than 7 or greater than 7 with signs of bleeding and hemodynamic instability     Jaqueline Villalta PA-C  General Inpatient Medicine (\"IMS\") / Hospitalist Service  Available via [employer] [phone]. Outside of these hours, please contact providers assigned to the team and/or via the Medicine Acute Pager.    I spent a total of 40 minutes in the overall professional care of this patient on this date of service.    Dragon dictation software was used to dictate this note and thus there may be minor errors in translation/transcription including garbled speech or misspellings. Please contact for clarification if needed.    HISTORY OF PRESENT ILLNESS:     History Of Present Illness:  "   Gilberto White is a 63 y.o. male with a significant past medical history of HTN, HLD, bilateral renal artery stenosis, T2DM, CKD stage IIIa presenting with right flank and abdominal pain x 1 week. He has RLQ intermittent pain that worsens with movements. He was initially improving with Biofreeze, but has worsening pain in past couple days after helping lifted a 200 lbs individual. He initially thought the pain was due to kidney stones. His wife states he has decreased appetite for past few days.  He had a CT scan which shows acute appendicitis. He was given zosyn and had an appendectomy laparoscopic for acute perforated appendicitis with abscess. Currently he is lying in bed in no acute distress, has minor abd pain which is to be expected after post-op, and mainly nauseous.     Review of Systems:   I performed a complete 12 point review of systems and it is negative except as noted in HPI or above. All other systems have been reviewed and are negative.    PAST HISTORIES:     Past Medical History:  He has a past medical history of Anemia (02/03/2023), Chronic arthralgias of knees and hips (02/03/2023), Essential hypertension (02/03/2023), Hyperbilirubinemia (02/03/2023), Hypertension, and Low serum vitamin D (02/03/2023).    Past Surgical History:  He has a past surgical history that includes Other surgical history (11/24/2021) and Other surgical history (11/24/2021).      Social History:  He reports that he has never smoked. He has never used smokeless tobacco. He reports that he does not drink alcohol and does not use drugs.    Family History:  Family History   Problem Relation Name Age of Onset   • Stroke Mother     • Diabetes Mother     • Breast cancer Mother     • Diabetes type II Mother          with other skin complication   • Diabetes Father     • Myasthenia gravis Father     • Diabetes type II Father     • Stroke Sibling          Allergies:  Patient has no known allergies.    OBJECTIVE:     Last Recorded  "Vitals:  Vitals:    09/22/24 2016 09/22/24 2020 09/22/24 2030 09/22/24 2040   BP:  141/62 136/62 138/63   Pulse:  79 81 82   Resp:  25 26 24   Temp:       SpO2: 99% 94% 93% 94%   Weight:       Height:         /63   Pulse 82   Temp 36.2 °C (97.2 °F)   Resp 24   Ht 1.727 m (5' 8\")   Wt 80.7 kg (178 lb)   SpO2 94%   BMI 27.06 kg/m²      Physical Exam:  Vital signs and nursing notes reviewed.   Constitutional: Pleasant and cooperative. Laying in bed in no acute distress. Conversant.   Skin: Warm and dry; no obvious lesions, rashes, pallor, or jaundice. Good turgor.   Eyes: EOMI. Anicteric sclera.   ENT: Mucous membranes moist; no obvious injury or deformity appreciated.   Head and Neck: Normocephalic, atraumatic. ROM preserved. Trachea midline. No appreciable JVD.   Respiratory: Nonlabored on RA. Lungs clear to auscultation bilaterally without obvious adventitious sounds. Chest rise is equal.  Cardiovascular: RRR. No gross murmur, gallop, or rub. Extremities are warm and well-perfused with good capillary refill (< 3 seconds). No chest wall tenderness.   GI: White dressing with drain attached with central incision in abdomen noted. Did not remove the dressing. Abdomen soft, nontender, nondistended. Bowel sounds are present and normoactive.  : No CVA tenderness.   MSK: No gross abnormalities appreciated. No limitations to AROM/PROM appreciated.   Extremities: No cyanosis, edema, or clubbing evident. Neurovascularly intact.   Neuro: A&Ox3. CN 2-12 grossly intact. Able to respond to questions appropriately and clearly. No acute focal neurologic deficits appreciated.  Psych: Appropriate mood and behavior.    Inpatient Medications:  lidocaine, 0.1 mL, subcutaneous, Once  oxygen, , inhalation, Continuous - Inhalation  piperacillin-tazobactam, 3.375 g, intravenous, q8h      PRN medications: HYDROmorphone, morphine, promethazine    Outpatient Medications:  Prior to Admission medications    Medication Sig Start " Date End Date Taking? Authorizing Provider   aflibercept (Eylea) 2 mg/0.05 mL intra-ocular injection 0.05 mL (2 mg) by intravitreal route every 30 (thirty) days.    Historical Provider, MD   amLODIPine (Norvasc) 5 mg tablet TAKE 1 TABLET BY MOUTH EVERY DAY 10/9/23   Federico Katz MD   chlorthalidone (Hygroton) 25 mg tablet Take 1 tablet (25 mg) by mouth once daily. 8/1/23   Historical Provider, MD   cholecalciferol (Vitamin D-3) 125 MCG (5000 UT) capsule Vitamin D3 125 MCG (5000 UT) Oral Capsule   Refills: 0       Active    Historical Provider, MD   Jardiance 10 mg Take 1 tablet (10 mg) by mouth once daily. 3/5/24 9/1/24  Historical Provider, MD   losartan (Cozaar) 100 mg tablet Take 25 mg by mouth once every 24 hours.    Historical Provider, MD   metoprolol succinate XL (Toprol-XL) 25 mg 24 hr tablet Take 1 tablet (25 mg) by mouth once daily.    Historical Provider, MD   multivit-minerals/FA/lycopene (ONE DAILY FOR MEN ORAL)     Historical Provider, MD   rosuvastatin (Crestor) 10 mg tablet Take 1 tablet (10 mg) by mouth once daily. 8/28/24 10/2/25  Federico Katz MD       LABS AND IMAGING:     Labs:  Recent labs reviewed in the EMR.    Results from last 7 days   Lab Units 09/22/24  1444   WBC AUTO x10*3/uL 9.3   HEMOGLOBIN g/dL 10.2*   HEMATOCRIT % 30.1*   PLATELETS AUTO x10*3/uL 209      Results from last 7 days   Lab Units 09/22/24  1444   SODIUM mmol/L 137   POTASSIUM mmol/L 4.0   CHLORIDE mmol/L 106   CO2 mmol/L 23   BUN mg/dL 89*   CREATININE mg/dL 2.27*   GLUCOSE mg/dL 120*   CALCIUM mg/dL 8.2*      Results from last 7 days   Lab Units 09/22/24  1444   SODIUM mmol/L 137   POTASSIUM mmol/L 4.0   CHLORIDE mmol/L 106   CO2 mmol/L 23   BUN mg/dL 89*   CREATININE mg/dL 2.27*   CALCIUM mg/dL 8.2*   PROTEIN TOTAL g/dL 7.3   BILIRUBIN TOTAL mg/dL 0.7   ALK PHOS U/L 79   ALT U/L 25   AST U/L 26   GLUCOSE mg/dL 120*             Imaging:  CT abdomen pelvis wo IV contrast    Result Date:  9/22/2024  Interpreted By:  Anna Stephens, STUDY: CT ABDOMEN PELVIS WO IV CONTRAST;  9/22/2024 3:08 pm   INDICATION: Signs/Symptoms:right flank and rlq pain, tender, hx kidney stones.   COMPARISON: 09/05/2023   ACCESSION NUMBER(S): [id number]   ORDERING CLINICIAN: ANGELINA MCFADDEN   TECHNIQUE: CT of the abdomen and pelvis was performed. Contiguous axial images were obtained at 3 mm slice thickness through the abdomen and pelvis. Coronal and sagittal reconstructions at 3 mm slice thickness were performed.  No intravenous or oral contrast agents were administered.   FINDINGS: Please note that the evaluation of vessels, lymph nodes and organs is limited without intravenous contrast.   LOWER CHEST: There are bands of atelectasis in the lung bases and right middle lobe. No pleural effusion. Gynecomastia noted.   ABDOMEN:   LIVER: Normal size, contour, and density. No focal lesion evident.   GALLBLADDER: Normal-size. Hazy increased density in the dependent portion of the gallbladder, likely sludge. No definite radiopaque gallstone.   BILE DUCTS: Normal caliber.   PANCREAS: The pancreas appears within normal limits, no evidence of pancreatitis or mass.   SPLEEN: Normal size.   ADRENAL GLANDS: Within normal limits.   KIDNEYS AND URETERS: Bilateral perinephric edema. No hydronephrosis. Bilateral nonobstructing calculi are present. The largest is in the right renal pelvis, triangular shaped, 1.3 cm. No ureteral stone. No focal lesion evident.   PELVIS:   BLADDER: Within normal limits.   REPRODUCTIVE ORGANS: Mild prostate enlargement.   BOWEL: The stomach is unremarkable. The small bowel is normal in caliber without evidence of focal wall thickening or obstruction. There is no evidence of focal wall thickening or dilatation of the large bowel. Markedly abnormal appendix is diffusely thickened and dilated, 1.8 cm diameter, with significant surrounding fat stranding, extends along right paracolic gutter up to near the gallbladder  fossa. There is calcific density column of material in the proximal appendix which may represent 1 or more appendicoliths although difficult to measure. Significant fat stranding present around the appendix, there is no definite fluid collection or abscess. No evidence of perforation.   VESSELS: Normal caliber aorta. Atherosclerosis present.   PERITONEUM/RETROPERITONEUM/LYMPH NODES: Retroperitoneal edema bilateral perinephric space and along the paracolic gutters. Edema surrounds the dilated appendix, however no significant free fluid. No pneumoperitoneum. No adenopathy.   BONES AND ABDOMINAL WALL: No evidence of acute fracture. No suspicious osseous lesions are identified.Mild degenerative changes in the spine and hips. Small fat containing left inguinal hernia.       1.  Acute appendicitis. There is a markedly dilated (1.8 cm) and thick-walled appendix containing some radiopaque material proximally, suspect there is probably an appendicolith within although difficult to measure. Periappendiceal fat stranding is present, however no definite abscess or evidence of perforation. 2. Bilateral nonobstructing nephrolithiasis. Perinephric edema is symmetric and nonspecific. Correlate for any clinical evidence of UTI. 3. Ancillary findings as above.     Signed by: Anna Stephens 9/22/2024 4:09 PM Dictation workstation:   [id number]

## 2024-09-24 ENCOUNTER — APPOINTMENT (OUTPATIENT)
Dept: RADIOLOGY | Facility: HOSPITAL | Age: 64
DRG: 853 | End: 2024-09-24
Payer: COMMERCIAL

## 2024-09-24 LAB
ANION GAP SERPL CALC-SCNC: 11 MMOL/L (ref 10–20)
BASOPHILS # BLD AUTO: 0.04 X10*3/UL (ref 0–0.1)
BASOPHILS NFR BLD AUTO: 0.3 %
BUN SERPL-MCNC: 69 MG/DL (ref 6–23)
CALCIUM SERPL-MCNC: 7.5 MG/DL (ref 8.6–10.3)
CHLORIDE SERPL-SCNC: 109 MMOL/L (ref 98–107)
CO2 SERPL-SCNC: 24 MMOL/L (ref 21–32)
CREAT SERPL-MCNC: 2.4 MG/DL (ref 0.5–1.3)
EGFRCR SERPLBLD CKD-EPI 2021: 30 ML/MIN/1.73M*2
EOSINOPHIL # BLD AUTO: 0.01 X10*3/UL (ref 0–0.7)
EOSINOPHIL NFR BLD AUTO: 0.1 %
ERYTHROCYTE [DISTWIDTH] IN BLOOD BY AUTOMATED COUNT: 12.1 % (ref 11.5–14.5)
GLUCOSE BLD MANUAL STRIP-MCNC: 130 MG/DL (ref 74–99)
GLUCOSE BLD MANUAL STRIP-MCNC: 138 MG/DL (ref 74–99)
GLUCOSE BLD MANUAL STRIP-MCNC: 144 MG/DL (ref 74–99)
GLUCOSE BLD MANUAL STRIP-MCNC: 183 MG/DL (ref 74–99)
GLUCOSE SERPL-MCNC: 145 MG/DL (ref 74–99)
HCT VFR BLD AUTO: 26.6 % (ref 41–52)
HGB BLD-MCNC: 9.2 G/DL (ref 13.5–17.5)
IMM GRANULOCYTES # BLD AUTO: 0.09 X10*3/UL (ref 0–0.7)
IMM GRANULOCYTES NFR BLD AUTO: 0.7 % (ref 0–0.9)
LYMPHOCYTES # BLD AUTO: 1.01 X10*3/UL (ref 1.2–4.8)
LYMPHOCYTES NFR BLD AUTO: 7.7 %
MAGNESIUM SERPL-MCNC: 3.14 MG/DL (ref 1.6–2.4)
MCH RBC QN AUTO: 32.4 PG (ref 26–34)
MCHC RBC AUTO-ENTMCNC: 34.6 G/DL (ref 32–36)
MCV RBC AUTO: 94 FL (ref 80–100)
MONOCYTES # BLD AUTO: 0.95 X10*3/UL (ref 0.1–1)
MONOCYTES NFR BLD AUTO: 7.3 %
NEUTROPHILS # BLD AUTO: 10.98 X10*3/UL (ref 1.2–7.7)
NEUTROPHILS NFR BLD AUTO: 83.9 %
NRBC BLD-RTO: 0 /100 WBCS (ref 0–0)
PLATELET # BLD AUTO: 204 X10*3/UL (ref 150–450)
POTASSIUM SERPL-SCNC: 4.2 MMOL/L (ref 3.5–5.3)
RBC # BLD AUTO: 2.84 X10*6/UL (ref 4.5–5.9)
SODIUM SERPL-SCNC: 140 MMOL/L (ref 136–145)
WBC # BLD AUTO: 13.1 X10*3/UL (ref 4.4–11.3)

## 2024-09-24 PROCEDURE — 97116 GAIT TRAINING THERAPY: CPT | Mod: GP,CQ | Performed by: PHYSICAL THERAPY ASSISTANT

## 2024-09-24 PROCEDURE — 2500000004 HC RX 250 GENERAL PHARMACY W/ HCPCS (ALT 636 FOR OP/ED)

## 2024-09-24 PROCEDURE — 2500000001 HC RX 250 WO HCPCS SELF ADMINISTERED DRUGS (ALT 637 FOR MEDICARE OP)

## 2024-09-24 PROCEDURE — 80048 BASIC METABOLIC PNL TOTAL CA: CPT | Performed by: SURGERY

## 2024-09-24 PROCEDURE — 1210000001 HC SEMI-PRIVATE ROOM DAILY

## 2024-09-24 PROCEDURE — 76770 US EXAM ABDO BACK WALL COMP: CPT

## 2024-09-24 PROCEDURE — 2500000004 HC RX 250 GENERAL PHARMACY W/ HCPCS (ALT 636 FOR OP/ED): Performed by: SURGERY

## 2024-09-24 PROCEDURE — 2500000002 HC RX 250 W HCPCS SELF ADMINISTERED DRUGS (ALT 637 FOR MEDICARE OP, ALT 636 FOR OP/ED)

## 2024-09-24 PROCEDURE — 76770 US EXAM ABDO BACK WALL COMP: CPT | Performed by: RADIOLOGY

## 2024-09-24 PROCEDURE — 82947 ASSAY GLUCOSE BLOOD QUANT: CPT

## 2024-09-24 PROCEDURE — 36415 COLL VENOUS BLD VENIPUNCTURE: CPT | Performed by: SURGERY

## 2024-09-24 PROCEDURE — 85025 COMPLETE CBC W/AUTO DIFF WBC: CPT | Performed by: SURGERY

## 2024-09-24 PROCEDURE — 97530 THERAPEUTIC ACTIVITIES: CPT | Mod: GP,CQ | Performed by: PHYSICAL THERAPY ASSISTANT

## 2024-09-24 PROCEDURE — 97535 SELF CARE MNGMENT TRAINING: CPT | Mod: GO,CO

## 2024-09-24 PROCEDURE — 99233 SBSQ HOSP IP/OBS HIGH 50: CPT | Performed by: PHYSICIAN ASSISTANT

## 2024-09-24 PROCEDURE — 83735 ASSAY OF MAGNESIUM: CPT | Performed by: PHYSICIAN ASSISTANT

## 2024-09-24 RX ADMIN — EMPAGLIFLOZIN 10 MG: 10 TABLET, FILM COATED ORAL at 08:46

## 2024-09-24 RX ADMIN — ROSUVASTATIN 10 MG: 10 TABLET, FILM COATED ORAL at 20:37

## 2024-09-24 RX ADMIN — PIPERACILLIN SODIUM AND TAZOBACTAM SODIUM 2.25 G: 2; .25 INJECTION, SOLUTION INTRAVENOUS at 11:30

## 2024-09-24 RX ADMIN — AMLODIPINE BESYLATE 5 MG: 5 TABLET ORAL at 08:46

## 2024-09-24 RX ADMIN — SODIUM CHLORIDE, POTASSIUM CHLORIDE, SODIUM LACTATE AND CALCIUM CHLORIDE 100 ML/HR: 600; 310; 30; 20 INJECTION, SOLUTION INTRAVENOUS at 19:41

## 2024-09-24 RX ADMIN — MORPHINE SULFATE 2 MG: 2 INJECTION, SOLUTION INTRAMUSCULAR; INTRAVENOUS at 04:08

## 2024-09-24 RX ADMIN — MORPHINE SULFATE 2 MG: 2 INJECTION, SOLUTION INTRAMUSCULAR; INTRAVENOUS at 23:29

## 2024-09-24 RX ADMIN — SODIUM CHLORIDE, POTASSIUM CHLORIDE, SODIUM LACTATE AND CALCIUM CHLORIDE 100 ML/HR: 600; 310; 30; 20 INJECTION, SOLUTION INTRAVENOUS at 07:20

## 2024-09-24 RX ADMIN — PIPERACILLIN SODIUM AND TAZOBACTAM SODIUM 2.25 G: 2; .25 INJECTION, SOLUTION INTRAVENOUS at 22:59

## 2024-09-24 RX ADMIN — MORPHINE SULFATE 2 MG: 2 INJECTION, SOLUTION INTRAMUSCULAR; INTRAVENOUS at 09:16

## 2024-09-24 RX ADMIN — PIPERACILLIN SODIUM AND TAZOBACTAM SODIUM 2.25 G: 2; .25 INJECTION, SOLUTION INTRAVENOUS at 17:02

## 2024-09-24 RX ADMIN — MORPHINE SULFATE 2 MG: 2 INJECTION, SOLUTION INTRAMUSCULAR; INTRAVENOUS at 17:03

## 2024-09-24 RX ADMIN — PIPERACILLIN SODIUM AND TAZOBACTAM SODIUM 2.25 G: 2; .25 INJECTION, SOLUTION INTRAVENOUS at 04:00

## 2024-09-24 RX ADMIN — METOPROLOL SUCCINATE 25 MG: 25 TABLET, EXTENDED RELEASE ORAL at 20:37

## 2024-09-24 ASSESSMENT — PAIN SCALES - GENERAL
PAINLEVEL_OUTOF10: 5 - MODERATE PAIN
PAINLEVEL_OUTOF10: 2
PAINLEVEL_OUTOF10: 4
PAINLEVEL_OUTOF10: 5 - MODERATE PAIN
PAINLEVEL_OUTOF10: 3
PAINLEVEL_OUTOF10: 5 - MODERATE PAIN
PAINLEVEL_OUTOF10: 4
PAINLEVEL_OUTOF10: 2

## 2024-09-24 ASSESSMENT — COGNITIVE AND FUNCTIONAL STATUS - GENERAL
CLIMB 3 TO 5 STEPS WITH RAILING: A LITTLE
MOBILITY SCORE: 24
DRESSING REGULAR LOWER BODY CLOTHING: A LITTLE
MOBILITY SCORE: 18
MOBILITY SCORE: 23
STANDING UP FROM CHAIR USING ARMS: A LITTLE
DAILY ACTIVITIY SCORE: 21
CLIMB 3 TO 5 STEPS WITH RAILING: A LITTLE
MOVING FROM LYING ON BACK TO SITTING ON SIDE OF FLAT BED WITH BEDRAILS: A LITTLE
DRESSING REGULAR LOWER BODY CLOTHING: A LITTLE
HELP NEEDED FOR BATHING: A LITTLE
HELP NEEDED FOR BATHING: A LITTLE
MOVING TO AND FROM BED TO CHAIR: A LITTLE
DAILY ACTIVITIY SCORE: 22
TOILETING: A LITTLE
DAILY ACTIVITIY SCORE: 24
WALKING IN HOSPITAL ROOM: A LITTLE
TURNING FROM BACK TO SIDE WHILE IN FLAT BAD: A LITTLE

## 2024-09-24 ASSESSMENT — ENCOUNTER SYMPTOMS
DIARRHEA: 0
APPETITE CHANGE: 0
FLANK PAIN: 0
COUGH: 0
DIAPHORESIS: 0
HEADACHES: 0
VOMITING: 0
CHILLS: 0
DIZZINESS: 0
FATIGUE: 0
WOUND: 0
PALPITATIONS: 0
SORE THROAT: 0
JOINT SWELLING: 0
WHEEZING: 0
LIGHT-HEADEDNESS: 0
BACK PAIN: 0
CONSTIPATION: 0
EYE PAIN: 0
TROUBLE SWALLOWING: 0
HALLUCINATIONS: 0
FACIAL SWELLING: 0
HEMATURIA: 0
WEAKNESS: 0
DYSURIA: 0
BLOOD IN STOOL: 0
CHEST TIGHTNESS: 0
BRUISES/BLEEDS EASILY: 0
NUMBNESS: 0
ABDOMINAL PAIN: 1
NAUSEA: 0
SHORTNESS OF BREATH: 0
FREQUENCY: 0
FEVER: 0

## 2024-09-24 ASSESSMENT — PAIN DESCRIPTION - LOCATION: LOCATION: ABDOMEN

## 2024-09-24 ASSESSMENT — PAIN - FUNCTIONAL ASSESSMENT
PAIN_FUNCTIONAL_ASSESSMENT: 0-10

## 2024-09-24 ASSESSMENT — ACTIVITIES OF DAILY LIVING (ADL): HOME_MANAGEMENT_TIME_ENTRY: 16

## 2024-09-24 NOTE — PROGRESS NOTES
"GENERAL SURGERY PROGRESS NOTE    Gilberto White   1960   [id number]     Gilberto White is a 63 y.o. male on day 2 of admission presenting with Acute appendicitis with appendiceal abscess.    Appendectomy Laparoscopy on 9/22, 2 Days Post-Op    Subjective  PT EDMUND, NAEON, midline dressing removed Having minimal pain and toleration liquids. Rio w/ serosanguinous output. No BM yet, wbc at 13    Review of Systems:  Review of Systems   Constitutional:  Negative for chills, fatigue and fever.   Respiratory:  Negative for cough, chest tightness and shortness of breath.    Gastrointestinal:  Positive for abdominal pain. Negative for constipation, diarrhea, nausea and vomiting.   Neurological:  Negative for dizziness, light-headedness and headaches.       Objective    Last Recorded Vitals  Blood pressure 111/65, pulse 82, temperature 37 °C (98.6 °F), temperature source Temporal, resp. rate 19, height 1.727 m (5' 8\"), weight 80.7 kg (178 lb), SpO2 97%.    Intake/Output last 3 Shifts:  I/O last 3 completed shifts:  In: 5183.3 (64.2 mL/kg) [P.O.:1730; I.V.:3153.3 (39.1 mL/kg); IV Piggyback:300]  Out: 2518 (31.2 mL/kg) [Urine:2180 (0.8 mL/kg/hr); Drains:335; Blood:3]  Weight: 80.7 kg     Intake/Output Summary (Last 24 hours) at 9/24/2024 1524  Last data filed at 9/24/2024 1349  Gross per 24 hour   Intake 2220 ml   Output 105 ml   Net 2115 ml       Physical Exam  Constitutional:       General: He is not in acute distress.     Appearance: Normal appearance. He is normal weight.   HENT:      Head: Normocephalic and atraumatic.      Mouth/Throat:      Mouth: Mucous membranes are moist.      Pharynx: Oropharynx is clear.   Cardiovascular:      Rate and Rhythm: Normal rate.      Pulses: Normal pulses.   Pulmonary:      Effort: Pulmonary effort is normal. No respiratory distress.   Chest:      Chest wall: No tenderness.   Abdominal:      Comments: Soft, non distended, incisional tenderness, midline dressing removed, rio w/ serosang " output,   Skin:     General: Skin is warm and dry.   Neurological:      Mental Status: He is alert.         Relevant Results  Labs:  Results for orders placed or performed during the hospital encounter of 09/22/24 (from the past 24 hour(s))   POCT GLUCOSE   Result Value Ref Range    POCT Glucose 183 (H) 74 - 99 mg/dL   Blood Culture    Specimen: Peripheral Venipuncture; Blood culture   Result Value Ref Range    Blood Culture Loaded on Instrument - Culture in progress    Blood Culture    Specimen: Peripheral Venipuncture; Blood culture   Result Value Ref Range    Blood Culture Loaded on Instrument - Culture in progress    POCT GLUCOSE   Result Value Ref Range    POCT Glucose 161 (H) 74 - 99 mg/dL   Magnesium   Result Value Ref Range    Magnesium 3.14 (H) 1.60 - 2.40 mg/dL   Basic Metabolic Panel   Result Value Ref Range    Glucose 145 (H) 74 - 99 mg/dL    Sodium 140 136 - 145 mmol/L    Potassium 4.2 3.5 - 5.3 mmol/L    Chloride 109 (H) 98 - 107 mmol/L    Bicarbonate 24 21 - 32 mmol/L    Anion Gap 11 10 - 20 mmol/L    Urea Nitrogen 69 (H) 6 - 23 mg/dL    Creatinine 2.40 (H) 0.50 - 1.30 mg/dL    eGFR 30 (L) >60 mL/min/1.73m*2    Calcium 7.5 (L) 8.6 - 10.3 mg/dL   CBC and Auto Differential   Result Value Ref Range    WBC 13.1 (H) 4.4 - 11.3 x10*3/uL    nRBC 0.0 0.0 - 0.0 /100 WBCs    RBC 2.84 (L) 4.50 - 5.90 x10*6/uL    Hemoglobin 9.2 (L) 13.5 - 17.5 g/dL    Hematocrit 26.6 (L) 41.0 - 52.0 %    MCV 94 80 - 100 fL    MCH 32.4 26.0 - 34.0 pg    MCHC 34.6 32.0 - 36.0 g/dL    RDW 12.1 11.5 - 14.5 %    Platelets 204 150 - 450 x10*3/uL    Neutrophils % 83.9 40.0 - 80.0 %    Immature Granulocytes %, Automated 0.7 0.0 - 0.9 %    Lymphocytes % 7.7 13.0 - 44.0 %    Monocytes % 7.3 2.0 - 10.0 %    Eosinophils % 0.1 0.0 - 6.0 %    Basophils % 0.3 0.0 - 2.0 %    Neutrophils Absolute 10.98 (H) 1.20 - 7.70 x10*3/uL    Immature Granulocytes Absolute, Automated 0.09 0.00 - 0.70 x10*3/uL    Lymphocytes Absolute 1.01 (L) 1.20 - 4.80  x10*3/uL    Monocytes Absolute 0.95 0.10 - 1.00 x10*3/uL    Eosinophils Absolute 0.01 0.00 - 0.70 x10*3/uL    Basophils Absolute 0.04 0.00 - 0.10 x10*3/uL   POCT GLUCOSE   Result Value Ref Range    POCT Glucose 138 (H) 74 - 99 mg/dL   POCT GLUCOSE   Result Value Ref Range    POCT Glucose 183 (H) 74 - 99 mg/dL       Images:  US renal complete   Final Result   Small amount of perinephric fluid bilaterally.        No hydronephrosis or renal atrophy.        Known renal calculi seen on CT study done 2 days are difficult to   visualize on this examination        MACRO:   None.        Signed by: Reanna Godinez 9/24/2024 12:45 PM   Dictation workstation:   [id number]      CT abdomen pelvis wo IV contrast   Final Result   1.  Acute appendicitis. There is a markedly dilated (1.8 cm) and   thick-walled appendix containing some radiopaque material proximally,   suspect there is probably an appendicolith within although difficult   to measure. Periappendiceal fat stranding is present, however no   definite abscess or evidence of perforation.   2. Bilateral nonobstructing nephrolithiasis. Perinephric edema is   symmetric and nonspecific. Correlate for any clinical evidence of UTI.   3. Ancillary findings as above.             Signed by: Anna Stephens 9/22/2024 4:09 PM   Dictation workstation:   [id number]          Assessment and Plan  Principal Problem:    Acute appendicitis with appendiceal abscess    63 y.o. male with acute appendicitis and MARICARMEN  - Nephro consulted for elevated Cre, recs pending, had renal US today  - CLD and IV maintenance fluids till on adequate PO intake, Pt not ready for Fulls yet  - PRN pain and nausea medications  - Continue to monitor MIKI drain  - Continue IV Zosyn    Discussed with attending Dr. Ryan Huitron, DO - PGY3  General Surgery

## 2024-09-24 NOTE — CARE PLAN
Problem: Pain - Adult  Goal: Verbalizes/displays adequate comfort level or baseline comfort level  Outcome: Progressing  Flowsheets (Taken 9/24/2024 3342)  Verbalizes/displays adequate comfort level or baseline comfort level:   Assess pain using appropriate pain scale   Administer analgesics based on type and severity of pain and evaluate response   Implement non-pharmacological measures as appropriate and evaluate response   Consider cultural and social influences on pain and pain management     Problem: Safety - Adult  Goal: Free from fall injury  Outcome: Progressing     Problem: Discharge Planning  Goal: Discharge to home or other facility with appropriate resources  Outcome: Progressing     Problem: Chronic Conditions and Co-morbidities  Goal: Patient's chronic conditions and co-morbidity symptoms are monitored and maintained or improved  Outcome: Progressing     Problem: Diabetes  Goal: Achieve decreasing blood glucose levels by end of shift  Outcome: Progressing  Goal: Increase stability of blood glucose readings by end of shift  Outcome: Progressing  Goal: Decrease in ketones present in urine by end of shift  Outcome: Progressing  Goal: Maintain electrolyte levels within acceptable range throughout shift  Outcome: Progressing  Goal: Maintain glucose levels >70mg/dl to <250mg/dl throughout shift  Outcome: Progressing  Goal: No changes in neurological exam by end of shift  Outcome: Progressing  Goal: Learn about and adhere to nutrition recommendations by end of shift  Outcome: Progressing  Goal: Vital signs within normal range for age by end of shift  Outcome: Progressing  Goal: Increase self care and/or family involovement by end of shift  Outcome: Progressing  Goal: Receive DSME education by end of shift  Outcome: Progressing     Problem: Pain  Goal: Takes deep breaths with improved pain control throughout the shift  Outcome: Progressing  Goal: Turns in bed with improved pain control throughout the  shift  Outcome: Progressing  Goal: Walks with improved pain control throughout the shift  Outcome: Progressing  Goal: Performs ADL's with improved pain control throughout shift  Outcome: Progressing  Goal: Participates in PT with improved pain control throughout the shift  Outcome: Progressing  Goal: Free from opioid side effects throughout the shift  Outcome: Progressing  Goal: Free from acute confusion related to pain meds throughout the shift  Outcome: Progressing

## 2024-09-24 NOTE — PROGRESS NOTES
Physical Therapy    Physical Therapy Treatment    Patient Name: Gilberto White  MRN: [id number]  Department: [address]  Room: [facility]-  Today's Date: 9/24/2024  Time Calculation  Start Time: 0747  Stop Time: 0810  Time Calculation (min): 23 min         Assessment/Plan   PT Assessment  End of Session Communication: Bedside nurse  Assessment Comment: pt has met transfer goals and is proigrssingtoward allother goals. HE declined bed mobilty scondary to not ever sleeping in bed. pt was provided education on safety and technique.  End of Session Patient Position: Up in chair, Alarm on     PT Plan  Treatment/Interventions: Bed mobility, Transfer training, Gait training, Stair training, Endurance training  PT Plan: Ongoing PT  PT Frequency: 5 times per week  PT Discharge Recommendations: No PT needed after discharge  PT Recommended Transfer Status: Assist x1 (ABDOMINAL PRECAUTIONS)  PT - OK to Discharge: Yes (WHEN MEDICALLY CLEARED)      General Visit Information:   PT  Visit  PT Received On: 09/24/24  Response to Previous Treatment: Patient with no complaints from previous session.  General  Reason for Referral: appendicitis s/p appendectomy 9/22  Referred By: Ryan  Past Medical History Relevant to Rehab: Hx. of arthralgias hips and knees, anemia, MARICARMEN, DM  Prior to Session Communication: Bedside nurse  Patient Position Received: Alarm on, Up in chair  Preferred Learning Style: verbal  General Comment: pt agreeable to PT and cleared by RN. pt reporting he doesn't sleep in ab bed so doesn't; need to do bed mobilty. He reports he is in pain but feeling better.      Precautions:  Precautions  Medical Precautions: Fall precautions  Post-Surgical Precautions: Abdominal surgery precautions  Precautions Comment: L bulb drain , lower abdomen. incision      Pain:  Pain Assessment  Pain Assessment: 0-10  0-10 (Numeric) Pain Score: 4  Pain Type: Acute pain, Surgical pain  Pain Location: Abdomen  Cognition:  Cognition  Overall  Cognitive Status: Within Functional Limits            Treatments:  Bed Mobility  Bed Mobility: Yes    Ambulation/Gait Training  Ambulation/Gait Training Performed: Yes  Ambulation/Gait Training 1  Surface 1: Level tile  Device 1: No device  Assistance 1: Close supervision  Quality of Gait 1: Diminished heel strike  Comments/Distance (ft) 1: 200'x2 with occasional cues for gait pattern.  Transfers  Transfer: Yes  Transfer 1  Technique 1: Sit to stand, Stand to sit  Transfer Level of Assistance 1: Close supervision  Trials/Comments 1: cues for hand placement and safety.  Transfers 2  Technique 2: Stand pivot  Transfer Level of Assistance 2: Close supervision  Trials/Comments 2: cues for safety.    Stairs  Stairs: Yes  Stairs  Rails 1: Bilateral  Device 1: No device  Assistance 1: Close supervision  Comment/Number of Steps 1: up down 3 steps x 2. pt educated on safety.    Outcome Measures:  [facility] Basic Mobility  Turning from your back to your side while in a flat bed without using bedrails: A little  Moving from lying on your back to sitting on the side of a flat bed without using bedrails: A little  Moving to and from bed to chair (including a wheelchair): A little  Standing up from a chair using your arms (e.g. wheelchair or bedside chair): A little  To walk in hospital room: A little  Climbing 3-5 steps with railing: A little  Basic Mobility - Total Score: 18    Education Documentation  Mobility Training, taught by Therese Oswald PTA at 9/24/2024  8:27 AM.  Learner: Patient  Readiness: Acceptance  Method: Explanation  Response: Verbalizes Understanding, Needs Reinforcement    Education Comments  No comments found.               Encounter Problems       Encounter Problems (Active)       Mobility       STG - Patient will ambulate (Progressing)       Start:  09/23/24    Expected End:  09/26/24       300 FT SBA         STG - Patient will ascend and descend six stairs (Progressing)       Start:  09/23/24    Expected End:   09/26/24       RAILS SBA            PT Transfers       STG - Patient to transfer to and from sit to supine (Not Progressing)       Start:  09/23/24    Expected End:  09/25/24       SBA USING LOG ROLLING HB FLAT NO RAILS         STG - Patient will transfer sit to and from stand (Met)       Start:  09/23/24    Expected End:  09/25/24    Resolved:  09/24/24    SPLINTING INCISION SITE SBA            Pain - Adult

## 2024-09-24 NOTE — CONSULTS
Reason For Consult    RAFA on CKD stage III       History Of Present Illness      Interval History    Patient is doing   better had renal us no hydronephrosis. Rafa worse.     ROS  Denies chest pain, shortness of breath,  Nausea, vomiting , diarrhea, fever or chills.     No change in Past Medical History      No family hx of ESRD   Physical Exam  .Physical Exam  Constitutional:       Appearance: Normal appearance. He is not toxic-appearing.   HENT:      Head: Normocephalic and atraumatic.      Right Ear: External ear normal.      Mouth/Throat:      Mouth: Mucous membranes are moist.      Pharynx: Oropharynx is clear.   Eyes:      General: No scleral icterus.     Conjunctiva/sclera: Conjunctivae normal.      Pupils: Pupils are equal, round, and reactive to light.   Neck:      Vascular: No carotid bruit.   Cardiovascular:      Rate and Rhythm: Normal rate and regular rhythm.      Pulses: Normal pulses.      Heart sounds: Normal heart sounds.   Pulmonary:      Effort: Pulmonary effort is normal.      Breath sounds: Normal breath sounds. No wheezing or rales.   Abdominal:      Palpations: Abdomen is soft.      Tenderness: There is abdominal tenderness.   Musculoskeletal:         General: No swelling.      Cervical back: No rigidity.      Right lower leg: No edema.      Left lower leg: No edema.   Neurological:      General: No focal deficit present.      Mental Status: He is alert and oriented to person, place, and time. Mental status is at baseline.   Psychiatric:         Mood and Affect: Mood normal.         Behavior: Behavior normal.         Thought Content: Thought content normal.         Judgment: Judgment normal.                I&O 24HR    Intake/Output Summary (Last 24 hours) at 9/24/2024 1612  Last data filed at 9/24/2024 1349  Gross per 24 hour   Intake 2220 ml   Output 105 ml   Net 2115 ml       Vitals 24HR  Heart Rate:  [70-82]   Temp:  [35.8 °C (96.4 °F)-37.3 °C (99.1 °F)]   Resp:  [16-19]   BP:  (111-130)/(61-66)   SpO2:  [94 %-97 %]   .  Vitals:    09/24/24 0133 09/24/24 0557 09/24/24 0844 09/24/24 1349   BP: 130/61 127/65 128/66 111/65   BP Location: Left arm Left arm Left arm Left arm   Patient Position: Lying Lying Lying Lying   Pulse: 72 70 74 82   Resp: 18 19 19 19   Temp: 36.9 °C (98.4 °F) 37.3 °C (99.1 °F) 36.8 °C (98.2 °F) 37 °C (98.6 °F)   TempSrc: Temporal Temporal Temporal Temporal   SpO2: 96% 94% 96% 97%   Weight:       Height:              Relevant Results  .  Results from last 7 days   Lab Units 09/24/24  0403 09/23/24  0452 09/22/24  1444   SODIUM mmol/L 140 139 137   POTASSIUM mmol/L 4.2 4.3 4.0   CHLORIDE mmol/L 109* 109* 106   CO2 mmol/L 24 19* 23   BUN mg/dL 69* 76* 89*   CREATININE mg/dL 2.40* 2.24* 2.27*   EGFR mL/min/1.73m*2 30* 32* 32*   GLUCOSE mg/dL 145* 173* 120*   CALCIUM mg/dL 7.5* 7.7* 8.2*      .  Results from last 7 days   Lab Units 09/24/24  0403 09/23/24  0452 09/22/24  1444   WBC AUTO x10*3/uL 13.1* 13.3* 9.3   HEMOGLOBIN g/dL 9.2* 10.0* 10.2*   HEMATOCRIT % 26.6* 29.2* 30.1*   PLATELETS AUTO x10*3/uL 204 215 209         Assessment/Plan       MARICARMEN/ATN 2/2 Acute perforated appendicitis with abscess s/p appendectomy  (N17.0)  Azotemia (R79.89)  Metabolic acidosis E87.20  CKD3a  N18.31  Diabetes type 2   HTN  Hypocalcemia   Anemia in the setting CKD3a  HLD  Leukocytosis with acute appendicitis and appendiceal abscess K35.33    Recommendations   Scr about the same, Acute kidney injury consistent with acute tubular necrosis baseline creatinine 1.5-1.8 peak creatinine 2.24  Hypertension with hypertensive chronic kidney disease  Creatinine is slightly worse no evidence of pyelonephritis or hydronephrosis.  May be able to discontinue IV fluids by tomorrow depending on oral intake.   Agree with the choice of buffered IV fluids  Agree with holding SGLT2 inhibitor therapy as well at this time.  Urine studies ordered   UA + for grandular casts, + hyaline, + rbc 11-20  C/w IVF  at this  time, monitor for sx/s of volume overload     Your rx for appendicitis

## 2024-09-24 NOTE — PROGRESS NOTES
Gilberto White is a 63 y.o. male on day 2 of admission presenting with Acute appendicitis with appendiceal abscess.      Subjective   Gilberto White is a 63 y.o. male with past medical history s/f HTN, HLD, bilateral renal artery stenosis, T2DM, CKD stage IIIa who presented 9/22/24 with RUQ/R flank pain x1 week, found to have acute appendicitis (CT abdomen pelvis shows markedly dilated and thick-walled appendix containing radiopaque material approximately suspect appendicolith with associated periappendiceal fat stranding with no clear abscess or perforation) status post appendectomy laparoscopy on 9/22/2024. Medicine was consulted for medical management.    9/24/2024: No acute events overnight. Vitals stable, afebrile. Pain 2-5/10. Labs largely unchanged. Cr slightly worse today 2.40 (2.27) (baseline ~1.5), Renal US without hydronephrosis- suspect ATN. No metabolic acidosis. Has leukocytosis 13.1 today. Hgb stable 9.2  Op cx- abundant e. coli  Bl cx x2- pending   No BM but + flatus          Review of Systems   Constitutional:  Negative for appetite change, chills, diaphoresis, fatigue and fever.   HENT:  Negative for congestion, ear pain, facial swelling, hearing loss, nosebleeds, sore throat, tinnitus and trouble swallowing.    Eyes:  Negative for pain.   Respiratory:  Negative for cough, chest tightness, shortness of breath and wheezing.    Cardiovascular:  Negative for chest pain, palpitations and leg swelling.   Gastrointestinal:  Positive for abdominal pain. Negative for blood in stool, constipation, diarrhea, nausea and vomiting.   Genitourinary:  Negative for dysuria, flank pain, frequency, hematuria and urgency.   Musculoskeletal:  Negative for back pain and joint swelling.   Skin:  Negative for rash and wound.   Neurological:  Negative for dizziness, syncope, weakness, light-headedness, numbness and headaches.   Hematological:  Does not bruise/bleed easily.   Psychiatric/Behavioral:  Negative for  behavioral problems, hallucinations and suicidal ideas.           Objective     Last Recorded Vitals  /66 (BP Location: Left arm, Patient Position: Lying)   Pulse 74   Temp 36.8 °C (98.2 °F) (Temporal)   Resp 19   Wt 80.7 kg (178 lb)   SpO2 96%     Image Results  Electrocardiogram, 12-lead PRN ACS symptoms    Result Date: 9/23/2024  Sinus rhythm    CT abdomen pelvis wo IV contrast    Result Date: 9/22/2024  Interpreted By:  Anna Stephens, STUDY: CT ABDOMEN PELVIS WO IV CONTRAST;  9/22/2024 3:08 pm   INDICATION: Signs/Symptoms:right flank and rlq pain, tender, hx kidney stones.   COMPARISON: 09/05/2023   ACCESSION NUMBER(S): [id number]   ORDERING CLINICIAN: ANGELINA MCFADDEN   TECHNIQUE: CT of the abdomen and pelvis was performed. Contiguous axial images were obtained at 3 mm slice thickness through the abdomen and pelvis. Coronal and sagittal reconstructions at 3 mm slice thickness were performed.  No intravenous or oral contrast agents were administered.   FINDINGS: Please note that the evaluation of vessels, lymph nodes and organs is limited without intravenous contrast.   LOWER CHEST: There are bands of atelectasis in the lung bases and right middle lobe. No pleural effusion. Gynecomastia noted.   ABDOMEN:   LIVER: Normal size, contour, and density. No focal lesion evident.   GALLBLADDER: Normal-size. Hazy increased density in the dependent portion of the gallbladder, likely sludge. No definite radiopaque gallstone.   BILE DUCTS: Normal caliber.   PANCREAS: The pancreas appears within normal limits, no evidence of pancreatitis or mass.   SPLEEN: Normal size.   ADRENAL GLANDS: Within normal limits.   KIDNEYS AND URETERS: Bilateral perinephric edema. No hydronephrosis. Bilateral nonobstructing calculi are present. The largest is in the right renal pelvis, triangular shaped, 1.3 cm. No ureteral stone. No focal lesion evident.   PELVIS:   BLADDER: Within normal limits.   REPRODUCTIVE ORGANS: Mild prostate  enlargement.   BOWEL: The stomach is unremarkable. The small bowel is normal in caliber without evidence of focal wall thickening or obstruction. There is no evidence of focal wall thickening or dilatation of the large bowel. Markedly abnormal appendix is diffusely thickened and dilated, 1.8 cm diameter, with significant surrounding fat stranding, extends along right paracolic gutter up to near the gallbladder fossa. There is calcific density column of material in the proximal appendix which may represent 1 or more appendicoliths although difficult to measure. Significant fat stranding present around the appendix, there is no definite fluid collection or abscess. No evidence of perforation.   VESSELS: Normal caliber aorta. Atherosclerosis present.   PERITONEUM/RETROPERITONEUM/LYMPH NODES: Retroperitoneal edema bilateral perinephric space and along the paracolic gutters. Edema surrounds the dilated appendix, however no significant free fluid. No pneumoperitoneum. No adenopathy.   BONES AND ABDOMINAL WALL: No evidence of acute fracture. No suspicious osseous lesions are identified.Mild degenerative changes in the spine and hips. Small fat containing left inguinal hernia.       1.  Acute appendicitis. There is a markedly dilated (1.8 cm) and thick-walled appendix containing some radiopaque material proximally, suspect there is probably an appendicolith within although difficult to measure. Periappendiceal fat stranding is present, however no definite abscess or evidence of perforation. 2. Bilateral nonobstructing nephrolithiasis. Perinephric edema is symmetric and nonspecific. Correlate for any clinical evidence of UTI. 3. Ancillary findings as above.     Signed by: Anna Stephens 9/22/2024 4:09 PM Dictation workstation:   [id number]       Lab Results  Results for orders placed or performed during the hospital encounter of 09/22/24 (from the past 24 hour(s))   POCT GLUCOSE   Result Value Ref Range    POCT Glucose 158 (H) 74  - 99 mg/dL   Sodium, Urine Random   Result Value Ref Range    Sodium, Urine Random 59 mmol/L    Creatinine, Urine Random 50.3 20.0 - 370.0 mg/dL    Sodium/Creatinine Ratio 117 Not established. mmol/g Creat   POCT GLUCOSE   Result Value Ref Range    POCT Glucose 183 (H) 74 - 99 mg/dL   Blood Culture    Specimen: Peripheral Venipuncture; Blood culture   Result Value Ref Range    Blood Culture Loaded on Instrument - Culture in progress    Blood Culture    Specimen: Peripheral Venipuncture; Blood culture   Result Value Ref Range    Blood Culture Loaded on Instrument - Culture in progress    POCT GLUCOSE   Result Value Ref Range    POCT Glucose 161 (H) 74 - 99 mg/dL   Magnesium   Result Value Ref Range    Magnesium 3.14 (H) 1.60 - 2.40 mg/dL   Basic Metabolic Panel   Result Value Ref Range    Glucose 145 (H) 74 - 99 mg/dL    Sodium 140 136 - 145 mmol/L    Potassium 4.2 3.5 - 5.3 mmol/L    Chloride 109 (H) 98 - 107 mmol/L    Bicarbonate 24 21 - 32 mmol/L    Anion Gap 11 10 - 20 mmol/L    Urea Nitrogen 69 (H) 6 - 23 mg/dL    Creatinine 2.40 (H) 0.50 - 1.30 mg/dL    eGFR 30 (L) >60 mL/min/1.73m*2    Calcium 7.5 (L) 8.6 - 10.3 mg/dL   CBC and Auto Differential   Result Value Ref Range    WBC 13.1 (H) 4.4 - 11.3 x10*3/uL    nRBC 0.0 0.0 - 0.0 /100 WBCs    RBC 2.84 (L) 4.50 - 5.90 x10*6/uL    Hemoglobin 9.2 (L) 13.5 - 17.5 g/dL    Hematocrit 26.6 (L) 41.0 - 52.0 %    MCV 94 80 - 100 fL    MCH 32.4 26.0 - 34.0 pg    MCHC 34.6 32.0 - 36.0 g/dL    RDW 12.1 11.5 - 14.5 %    Platelets 204 150 - 450 x10*3/uL    Neutrophils % 83.9 40.0 - 80.0 %    Immature Granulocytes %, Automated 0.7 0.0 - 0.9 %    Lymphocytes % 7.7 13.0 - 44.0 %    Monocytes % 7.3 2.0 - 10.0 %    Eosinophils % 0.1 0.0 - 6.0 %    Basophils % 0.3 0.0 - 2.0 %    Neutrophils Absolute 10.98 (H) 1.20 - 7.70 x10*3/uL    Immature Granulocytes Absolute, Automated 0.09 0.00 - 0.70 x10*3/uL    Lymphocytes Absolute 1.01 (L) 1.20 - 4.80 x10*3/uL    Monocytes Absolute 0.95 0.10  - 1.00 x10*3/uL    Eosinophils Absolute 0.01 0.00 - 0.70 x10*3/uL    Basophils Absolute 0.04 0.00 - 0.10 x10*3/uL   POCT GLUCOSE   Result Value Ref Range    POCT Glucose 138 (H) 74 - 99 mg/dL        Medications  Scheduled medications:  amLODIPine, 5 mg, oral, Daily  [Held by provider] chlorthalidone, 25 mg, oral, Daily  empagliflozin, 10 mg, oral, Daily  [Held by provider] losartan, 25 mg, oral, Daily  metoprolol succinate XL, 25 mg, oral, Daily  piperacillin-tazobactam, 2.25 g, intravenous, q6h  rosuvastatin, 10 mg, oral, Nightly      Continuous medications:  lactated Ringer's, 100 mL/hr, Last Rate: 100 mL/hr (09/24/24 0720)      PRN medications:  PRN medications: acetaminophen, morphine, morphine, ondansetron, promethazine     Physical Exam  Constitutional:       General: He is not in acute distress.     Appearance: Normal appearance.   HENT:      Head: Normocephalic and atraumatic.      Right Ear: External ear normal.      Left Ear: External ear normal.      Nose: Nose normal.      Mouth/Throat:      Mouth: Mucous membranes are moist.      Pharynx: Oropharynx is clear.   Eyes:      Extraocular Movements: Extraocular movements intact.      Conjunctiva/sclera: Conjunctivae normal.      Pupils: Pupils are equal, round, and reactive to light.   Cardiovascular:      Rate and Rhythm: Normal rate and regular rhythm.      Pulses: Normal pulses.      Heart sounds: Normal heart sounds.   Pulmonary:      Effort: Pulmonary effort is normal. No respiratory distress.      Breath sounds: Normal breath sounds. No wheezing, rhonchi or rales.   Abdominal:      General: Bowel sounds are normal.      Palpations: Abdomen is soft.      Tenderness: There is abdominal tenderness. There is no right CVA tenderness, left CVA tenderness, guarding or rebound.      Comments: LLQ drain with slight bloody output   Musculoskeletal:         General: No swelling. Normal range of motion.      Cervical back: Normal range of motion and neck supple.    Skin:     General: Skin is warm and dry.      Capillary Refill: Capillary refill takes less than 2 seconds.      Findings: No lesion or rash.   Neurological:      General: No focal deficit present.      Mental Status: He is alert and oriented to person, place, and time. Mental status is at baseline.   Psychiatric:         Mood and Affect: Mood normal.         Behavior: Behavior normal.                  Assessment/Plan      Acute perforated appendicitis with abscesss/p laparoscopic converted to open appendectomy 9/22/2024 with sepsis   -Managed by primary care service team  -Continue on IV Zosyn  -Diet per general surgery, IVF hydration until able to maintain own hydration with PO intake   -Pain meds, antiemetics as needed  -Sepsis POA. Endorgan dysfunction: MARICARMEN     MARICARMEN/ATN on CKD stage IIIa  -BUN/creatinine 89/2.27 on admission, baseline creatinine 1.50-1.60  -Hold losartan, chlorthalidone  -Continue  mL/h  -Continue to trend while admitted  -Nephrology consult  -Renal US without hydronephrosis      HTN  -continue on home amlodipine, metoprolol and losartan  -Hold losartan, chlorthalidone given his MARICARMEN     HLD  -continue on statin     DM-II   -A1C of 6.0 on 8/20/24  -continue home jardiance     Anemia  -Hgb 10.2, stable and at near baseline  -Transfuse RBC for hemoglobin less than 7 or greater than 7 with signs of bleeding and hemodynamic instability     Code Status: Full Code     DVT ppx: Per primary      Please see orders for more complete plan    Thank you for involving us in the care of this very pleasant patient. We will follow along with you while they remain admitted. Please contact Hospitalist service if any clarification needed.     Zenaida Dillon PA-C

## 2024-09-25 LAB
ANION GAP SERPL CALC-SCNC: 12 MMOL/L (ref 10–20)
B-LACTAMASE ORGANISM ISLT: POSITIVE
BACTERIA SPEC CULT: ABNORMAL
BACTERIA SPEC CULT: ABNORMAL
BUN SERPL-MCNC: 66 MG/DL (ref 6–23)
CALCIUM SERPL-MCNC: 8 MG/DL (ref 8.6–10.3)
CHLORIDE SERPL-SCNC: 107 MMOL/L (ref 98–107)
CO2 SERPL-SCNC: 24 MMOL/L (ref 21–32)
CREAT SERPL-MCNC: 2.37 MG/DL (ref 0.5–1.3)
EGFRCR SERPLBLD CKD-EPI 2021: 30 ML/MIN/1.73M*2
ERYTHROCYTE [DISTWIDTH] IN BLOOD BY AUTOMATED COUNT: 11.9 % (ref 11.5–14.5)
GLUCOSE BLD MANUAL STRIP-MCNC: 111 MG/DL (ref 74–99)
GLUCOSE BLD MANUAL STRIP-MCNC: 129 MG/DL (ref 74–99)
GLUCOSE BLD MANUAL STRIP-MCNC: 141 MG/DL (ref 74–99)
GLUCOSE BLD MANUAL STRIP-MCNC: 173 MG/DL (ref 74–99)
GLUCOSE SERPL-MCNC: 129 MG/DL (ref 74–99)
GRAM STN SPEC: ABNORMAL
GRAM STN SPEC: ABNORMAL
HCT VFR BLD AUTO: 28.1 % (ref 41–52)
HGB BLD-MCNC: 9.4 G/DL (ref 13.5–17.5)
MAGNESIUM SERPL-MCNC: 3.28 MG/DL (ref 1.6–2.4)
MCH RBC QN AUTO: 31.9 PG (ref 26–34)
MCHC RBC AUTO-ENTMCNC: 33.5 G/DL (ref 32–36)
MCV RBC AUTO: 95 FL (ref 80–100)
NRBC BLD-RTO: 0 /100 WBCS (ref 0–0)
PLATELET # BLD AUTO: 240 X10*3/UL (ref 150–450)
POTASSIUM SERPL-SCNC: 4 MMOL/L (ref 3.5–5.3)
RBC # BLD AUTO: 2.95 X10*6/UL (ref 4.5–5.9)
SODIUM SERPL-SCNC: 139 MMOL/L (ref 136–145)
WBC # BLD AUTO: 12.9 X10*3/UL (ref 4.4–11.3)

## 2024-09-25 PROCEDURE — 82947 ASSAY GLUCOSE BLOOD QUANT: CPT

## 2024-09-25 PROCEDURE — 36415 COLL VENOUS BLD VENIPUNCTURE: CPT | Performed by: PHYSICIAN ASSISTANT

## 2024-09-25 PROCEDURE — 80048 BASIC METABOLIC PNL TOTAL CA: CPT | Performed by: PHYSICIAN ASSISTANT

## 2024-09-25 PROCEDURE — 2500000004 HC RX 250 GENERAL PHARMACY W/ HCPCS (ALT 636 FOR OP/ED): Performed by: SURGERY

## 2024-09-25 PROCEDURE — 99233 SBSQ HOSP IP/OBS HIGH 50: CPT | Performed by: PHYSICIAN ASSISTANT

## 2024-09-25 PROCEDURE — 1210000001 HC SEMI-PRIVATE ROOM DAILY

## 2024-09-25 PROCEDURE — 97116 GAIT TRAINING THERAPY: CPT | Mod: CQ,GP

## 2024-09-25 PROCEDURE — 97530 THERAPEUTIC ACTIVITIES: CPT | Mod: GO,CO

## 2024-09-25 PROCEDURE — 85027 COMPLETE CBC AUTOMATED: CPT | Performed by: PHYSICIAN ASSISTANT

## 2024-09-25 PROCEDURE — 2500000001 HC RX 250 WO HCPCS SELF ADMINISTERED DRUGS (ALT 637 FOR MEDICARE OP)

## 2024-09-25 PROCEDURE — 2500000004 HC RX 250 GENERAL PHARMACY W/ HCPCS (ALT 636 FOR OP/ED): Performed by: PHYSICIAN ASSISTANT

## 2024-09-25 PROCEDURE — 83735 ASSAY OF MAGNESIUM: CPT | Performed by: PHYSICIAN ASSISTANT

## 2024-09-25 PROCEDURE — 2500000002 HC RX 250 W HCPCS SELF ADMINISTERED DRUGS (ALT 637 FOR MEDICARE OP, ALT 636 FOR OP/ED)

## 2024-09-25 RX ADMIN — ROSUVASTATIN 10 MG: 10 TABLET, FILM COATED ORAL at 20:46

## 2024-09-25 RX ADMIN — PIPERACILLIN SODIUM AND TAZOBACTAM SODIUM 3.38 G: 3; .375 INJECTION, SOLUTION INTRAVENOUS at 17:19

## 2024-09-25 RX ADMIN — SODIUM CHLORIDE, POTASSIUM CHLORIDE, SODIUM LACTATE AND CALCIUM CHLORIDE 100 ML/HR: 600; 310; 30; 20 INJECTION, SOLUTION INTRAVENOUS at 06:48

## 2024-09-25 RX ADMIN — PIPERACILLIN SODIUM AND TAZOBACTAM SODIUM 3.38 G: 3; .375 INJECTION, SOLUTION INTRAVENOUS at 22:01

## 2024-09-25 RX ADMIN — PIPERACILLIN SODIUM AND TAZOBACTAM SODIUM 2.25 G: 2; .25 INJECTION, SOLUTION INTRAVENOUS at 04:12

## 2024-09-25 RX ADMIN — METOPROLOL SUCCINATE 25 MG: 25 TABLET, EXTENDED RELEASE ORAL at 20:47

## 2024-09-25 RX ADMIN — PIPERACILLIN SODIUM AND TAZOBACTAM SODIUM 2.25 G: 2; .25 INJECTION, SOLUTION INTRAVENOUS at 10:51

## 2024-09-25 RX ADMIN — AMLODIPINE BESYLATE 5 MG: 5 TABLET ORAL at 08:48

## 2024-09-25 RX ADMIN — ACETAMINOPHEN 650 MG: 325 TABLET ORAL at 21:55

## 2024-09-25 SDOH — ECONOMIC STABILITY: FOOD INSECURITY: WITHIN THE PAST 12 MONTHS, YOU WORRIED THAT YOUR FOOD WOULD RUN OUT BEFORE YOU GOT MONEY TO BUY MORE.: NEVER TRUE

## 2024-09-25 SDOH — ECONOMIC STABILITY: HOUSING INSECURITY: IN THE PAST 12 MONTHS, HOW MANY TIMES HAVE YOU MOVED WHERE YOU WERE LIVING?: 1

## 2024-09-25 SDOH — ECONOMIC STABILITY: TRANSPORTATION INSECURITY
IN THE PAST 12 MONTHS, HAS LACK OF TRANSPORTATION KEPT YOU FROM MEETINGS, WORK, OR FROM GETTING THINGS NEEDED FOR DAILY LIVING?: NO

## 2024-09-25 SDOH — ECONOMIC STABILITY: TRANSPORTATION INSECURITY
IN THE PAST 12 MONTHS, HAS THE LACK OF TRANSPORTATION KEPT YOU FROM MEDICAL APPOINTMENTS OR FROM GETTING MEDICATIONS?: NO

## 2024-09-25 SDOH — ECONOMIC STABILITY: INCOME INSECURITY: IN THE LAST 12 MONTHS, WAS THERE A TIME WHEN YOU WERE NOT ABLE TO PAY THE MORTGAGE OR RENT ON TIME?: NO

## 2024-09-25 SDOH — SOCIAL STABILITY: SOCIAL INSECURITY
WITHIN THE LAST YEAR, HAVE TO BEEN RAPED OR FORCED TO HAVE ANY KIND OF SEXUAL ACTIVITY BY YOUR PARTNER OR EX-PARTNER?: NO

## 2024-09-25 SDOH — ECONOMIC STABILITY: FOOD INSECURITY: WITHIN THE PAST 12 MONTHS, THE FOOD YOU BOUGHT JUST DIDN'T LAST AND YOU DIDN'T HAVE MONEY TO GET MORE.: NEVER TRUE

## 2024-09-25 SDOH — SOCIAL STABILITY: SOCIAL INSECURITY: WITHIN THE LAST YEAR, HAVE YOU BEEN AFRAID OF YOUR PARTNER OR EX-PARTNER?: NO

## 2024-09-25 SDOH — SOCIAL STABILITY: SOCIAL INSECURITY: WITHIN THE LAST YEAR, HAVE YOU BEEN HUMILIATED OR EMOTIONALLY ABUSED IN OTHER WAYS BY YOUR PARTNER OR EX-PARTNER?: NO

## 2024-09-25 SDOH — ECONOMIC STABILITY: INCOME INSECURITY: HOW HARD IS IT FOR YOU TO PAY FOR THE VERY BASICS LIKE FOOD, HOUSING, MEDICAL CARE, AND HEATING?: NOT HARD AT ALL

## 2024-09-25 SDOH — SOCIAL STABILITY: SOCIAL INSECURITY
WITHIN THE LAST YEAR, HAVE YOU BEEN KICKED, HIT, SLAPPED, OR OTHERWISE PHYSICALLY HURT BY YOUR PARTNER OR EX-PARTNER?: NO

## 2024-09-25 SDOH — ECONOMIC STABILITY: HOUSING INSECURITY: AT ANY TIME IN THE PAST 12 MONTHS, WERE YOU HOMELESS OR LIVING IN A SHELTER (INCLUDING NOW)?: NO

## 2024-09-25 SDOH — ECONOMIC STABILITY: INCOME INSECURITY: IN THE PAST 12 MONTHS, HAS THE ELECTRIC, GAS, OIL, OR WATER COMPANY THREATENED TO SHUT OFF SERVICE IN YOUR HOME?: NO

## 2024-09-25 ASSESSMENT — ENCOUNTER SYMPTOMS
DIZZINESS: 0
FLANK PAIN: 0
SORE THROAT: 0
LIGHT-HEADEDNESS: 0
FREQUENCY: 0
SHORTNESS OF BREATH: 0
NAUSEA: 0
FEVER: 0
CONSTIPATION: 0
FATIGUE: 0
VOMITING: 0
CHEST TIGHTNESS: 0
HALLUCINATIONS: 0
COUGH: 0
NUMBNESS: 0
PALPITATIONS: 0
ABDOMINAL PAIN: 1
APPETITE CHANGE: 0
TROUBLE SWALLOWING: 0
BLOOD IN STOOL: 0
EYE PAIN: 0
WHEEZING: 0
JOINT SWELLING: 0
WEAKNESS: 0
DIARRHEA: 0
FACIAL SWELLING: 0
DYSURIA: 0
DIAPHORESIS: 0
HEMATURIA: 0
BACK PAIN: 0
WOUND: 0
HEADACHES: 0
CHILLS: 0
BRUISES/BLEEDS EASILY: 0

## 2024-09-25 ASSESSMENT — COGNITIVE AND FUNCTIONAL STATUS - GENERAL
MOBILITY SCORE: 23
DAILY ACTIVITIY SCORE: 21
MOBILITY SCORE: 24
DAILY ACTIVITIY SCORE: 24
CLIMB 3 TO 5 STEPS WITH RAILING: A LITTLE
DRESSING REGULAR LOWER BODY CLOTHING: A LOT
HELP NEEDED FOR BATHING: A LITTLE
DAILY ACTIVITIY SCORE: 24
MOBILITY SCORE: 24

## 2024-09-25 ASSESSMENT — PAIN - FUNCTIONAL ASSESSMENT
PAIN_FUNCTIONAL_ASSESSMENT: 0-10

## 2024-09-25 ASSESSMENT — PAIN SCALES - GENERAL
PAINLEVEL_OUTOF10: 3
PAINLEVEL_OUTOF10: 0 - NO PAIN
PAINLEVEL_OUTOF10: 0 - NO PAIN
PAINLEVEL_OUTOF10: 2
PAINLEVEL_OUTOF10: 4
PAINLEVEL_OUTOF10: 0 - NO PAIN
PAINLEVEL_OUTOF10: 0 - NO PAIN

## 2024-09-25 ASSESSMENT — PAIN DESCRIPTION - DESCRIPTORS
DESCRIPTORS: SORE
DESCRIPTORS: SORE

## 2024-09-25 NOTE — PROGRESS NOTES
Gilberto White is a 64 y.o. male on day 3 of admission presenting with Acute appendicitis with appendiceal abscess.      Subjective   Gilberto White is a 63 y.o. male with past medical history s/f HTN, HLD, bilateral renal artery stenosis, T2DM, CKD stage IIIa who presented 9/22/24 with RUQ/R flank pain x1 week, found to have acute appendicitis (CT abdomen pelvis shows markedly dilated and thick-walled appendix containing radiopaque material approximately suspect appendicolith with associated periappendiceal fat stranding with no clear abscess or perforation) status post appendectomy laparoscopy on 9/22/2024. Medicine was consulted for medical management. Patient noted to have MARICARMEN/ATN on CKD. Renal US without hydronephrosis. Treated with conservative management/IVF    9/25/2024: No acute events overnight. Vitals stable, afebrile. Pain 0-3/10. Labs largely unchanged. Cr slightly 2.37 today (baseline ~1.5). No metabolic acidosis. Has leukocytosis 12.9 today. Hgb stable 9.4  Op cx- abundant e. Coli, pansensitive   Bl cx x2- ng x1 day  +flatus but no BM yet         Review of Systems   Constitutional:  Negative for appetite change, chills, diaphoresis, fatigue and fever.   HENT:  Negative for congestion, ear pain, facial swelling, hearing loss, nosebleeds, sore throat, tinnitus and trouble swallowing.    Eyes:  Negative for pain.   Respiratory:  Negative for cough, chest tightness, shortness of breath and wheezing.    Cardiovascular:  Negative for chest pain, palpitations and leg swelling.   Gastrointestinal:  Positive for abdominal pain. Negative for blood in stool, constipation, diarrhea, nausea and vomiting.   Genitourinary:  Negative for dysuria, flank pain, frequency, hematuria and urgency.   Musculoskeletal:  Negative for back pain and joint swelling.   Skin:  Negative for rash and wound.   Neurological:  Negative for dizziness, syncope, weakness, light-headedness, numbness and headaches.   Hematological:  Does  not bruise/bleed easily.   Psychiatric/Behavioral:  Negative for behavioral problems, hallucinations and suicidal ideas.           Objective     Last Recorded Vitals  /59 (BP Location: Left arm, Patient Position: Lying)   Pulse 71   Temp 37.1 °C (98.7 °F) (Temporal)   Resp 18   Wt 80.7 kg (178 lb)   SpO2 96%     Image Results  Electrocardiogram, 12-lead PRN ACS symptoms    Result Date: 9/23/2024  Sinus rhythm    CT abdomen pelvis wo IV contrast    Result Date: 9/22/2024  Interpreted By:  Anna Stephens, STUDY: CT ABDOMEN PELVIS WO IV CONTRAST;  9/22/2024 3:08 pm   INDICATION: Signs/Symptoms:right flank and rlq pain, tender, hx kidney stones.   COMPARISON: 09/05/2023   ACCESSION NUMBER(S): [id number]   ORDERING CLINICIAN: ANGELINA MCFADDEN   TECHNIQUE: CT of the abdomen and pelvis was performed. Contiguous axial images were obtained at 3 mm slice thickness through the abdomen and pelvis. Coronal and sagittal reconstructions at 3 mm slice thickness were performed.  No intravenous or oral contrast agents were administered.   FINDINGS: Please note that the evaluation of vessels, lymph nodes and organs is limited without intravenous contrast.   LOWER CHEST: There are bands of atelectasis in the lung bases and right middle lobe. No pleural effusion. Gynecomastia noted.   ABDOMEN:   LIVER: Normal size, contour, and density. No focal lesion evident.   GALLBLADDER: Normal-size. Hazy increased density in the dependent portion of the gallbladder, likely sludge. No definite radiopaque gallstone.   BILE DUCTS: Normal caliber.   PANCREAS: The pancreas appears within normal limits, no evidence of pancreatitis or mass.   SPLEEN: Normal size.   ADRENAL GLANDS: Within normal limits.   KIDNEYS AND URETERS: Bilateral perinephric edema. No hydronephrosis. Bilateral nonobstructing calculi are present. The largest is in the right renal pelvis, triangular shaped, 1.3 cm. No ureteral stone. No focal lesion evident.   PELVIS:   BLADDER:  Within normal limits.   REPRODUCTIVE ORGANS: Mild prostate enlargement.   BOWEL: The stomach is unremarkable. The small bowel is normal in caliber without evidence of focal wall thickening or obstruction. There is no evidence of focal wall thickening or dilatation of the large bowel. Markedly abnormal appendix is diffusely thickened and dilated, 1.8 cm diameter, with significant surrounding fat stranding, extends along right paracolic gutter up to near the gallbladder fossa. There is calcific density column of material in the proximal appendix which may represent 1 or more appendicoliths although difficult to measure. Significant fat stranding present around the appendix, there is no definite fluid collection or abscess. No evidence of perforation.   VESSELS: Normal caliber aorta. Atherosclerosis present.   PERITONEUM/RETROPERITONEUM/LYMPH NODES: Retroperitoneal edema bilateral perinephric space and along the paracolic gutters. Edema surrounds the dilated appendix, however no significant free fluid. No pneumoperitoneum. No adenopathy.   BONES AND ABDOMINAL WALL: No evidence of acute fracture. No suspicious osseous lesions are identified.Mild degenerative changes in the spine and hips. Small fat containing left inguinal hernia.       1.  Acute appendicitis. There is a markedly dilated (1.8 cm) and thick-walled appendix containing some radiopaque material proximally, suspect there is probably an appendicolith within although difficult to measure. Periappendiceal fat stranding is present, however no definite abscess or evidence of perforation. 2. Bilateral nonobstructing nephrolithiasis. Perinephric edema is symmetric and nonspecific. Correlate for any clinical evidence of UTI. 3. Ancillary findings as above.     Signed by: Anna Stephens 9/22/2024 4:09 PM Dictation workstation:   [id number]       Lab Results  Results for orders placed or performed during the hospital encounter of 09/22/24 (from the past 24 hour(s))   POCT  GLUCOSE   Result Value Ref Range    POCT Glucose 183 (H) 74 - 99 mg/dL   POCT GLUCOSE   Result Value Ref Range    POCT Glucose 130 (H) 74 - 99 mg/dL   POCT GLUCOSE   Result Value Ref Range    POCT Glucose 144 (H) 74 - 99 mg/dL   CBC   Result Value Ref Range    WBC 12.9 (H) 4.4 - 11.3 x10*3/uL    nRBC 0.0 0.0 - 0.0 /100 WBCs    RBC 2.95 (L) 4.50 - 5.90 x10*6/uL    Hemoglobin 9.4 (L) 13.5 - 17.5 g/dL    Hematocrit 28.1 (L) 41.0 - 52.0 %    MCV 95 80 - 100 fL    MCH 31.9 26.0 - 34.0 pg    MCHC 33.5 32.0 - 36.0 g/dL    RDW 11.9 11.5 - 14.5 %    Platelets 240 150 - 450 x10*3/uL   Basic Metabolic Panel   Result Value Ref Range    Glucose 129 (H) 74 - 99 mg/dL    Sodium 139 136 - 145 mmol/L    Potassium 4.0 3.5 - 5.3 mmol/L    Chloride 107 98 - 107 mmol/L    Bicarbonate 24 21 - 32 mmol/L    Anion Gap 12 10 - 20 mmol/L    Urea Nitrogen 66 (H) 6 - 23 mg/dL    Creatinine 2.37 (H) 0.50 - 1.30 mg/dL    eGFR 30 (L) >60 mL/min/1.73m*2    Calcium 8.0 (L) 8.6 - 10.3 mg/dL   Magnesium   Result Value Ref Range    Magnesium 3.28 (H) 1.60 - 2.40 mg/dL   POCT GLUCOSE   Result Value Ref Range    POCT Glucose 129 (H) 74 - 99 mg/dL        Medications  Scheduled medications:  amLODIPine, 5 mg, oral, Daily  [Held by provider] chlorthalidone, 25 mg, oral, Daily  [Held by provider] empagliflozin, 10 mg, oral, Daily  [Held by provider] losartan, 25 mg, oral, Daily  metoprolol succinate XL, 25 mg, oral, Daily  piperacillin-tazobactam, 2.25 g, intravenous, q6h  rosuvastatin, 10 mg, oral, Nightly      Continuous medications:  lactated Ringer's, 100 mL/hr, Last Rate: 100 mL/hr (09/25/24 0648)      PRN medications:  PRN medications: acetaminophen, morphine, morphine, ondansetron, promethazine     Physical Exam  Constitutional:       General: He is not in acute distress.     Appearance: Normal appearance.   HENT:      Head: Normocephalic and atraumatic.      Right Ear: External ear normal.      Left Ear: External ear normal.      Nose: Nose normal.       Mouth/Throat:      Mouth: Mucous membranes are moist.      Pharynx: Oropharynx is clear.   Eyes:      Extraocular Movements: Extraocular movements intact.      Conjunctiva/sclera: Conjunctivae normal.      Pupils: Pupils are equal, round, and reactive to light.   Cardiovascular:      Rate and Rhythm: Normal rate and regular rhythm.      Pulses: Normal pulses.      Heart sounds: Normal heart sounds.   Pulmonary:      Effort: Pulmonary effort is normal. No respiratory distress.      Breath sounds: Normal breath sounds. No wheezing, rhonchi or rales.   Abdominal:      General: Bowel sounds are normal.      Palpations: Abdomen is soft.      Tenderness: There is abdominal tenderness. There is no right CVA tenderness, left CVA tenderness, guarding or rebound.      Comments: LLQ drain with slight bloody output   Musculoskeletal:         General: No swelling. Normal range of motion.      Cervical back: Normal range of motion and neck supple.   Skin:     General: Skin is warm and dry.      Capillary Refill: Capillary refill takes less than 2 seconds.      Findings: No lesion or rash.   Neurological:      General: No focal deficit present.      Mental Status: He is alert and oriented to person, place, and time. Mental status is at baseline.   Psychiatric:         Mood and Affect: Mood normal.         Behavior: Behavior normal.                  Assessment/Plan      Acute perforated appendicitis with abscesss/p laparoscopic converted to open appendectomy 9/22/2024 with sepsis   -Managed by primary care service team  -Continue on IV Zosyn  -Diet per general surgery, IVF hydration until able to maintain own hydration with PO intake   -Pain meds, antiemetics as needed  -Follow culture- e. Coli, pan-sensitive   -Sepsis POA. Endorgan dysfunction: MARICARMEN     MARICARMEN/ATN on CKD stage IIIa  -BUN/creatinine 89/2.27 on admission, baseline creatinine 1.50-1.60  -Hold losartan, chlorthalidone  -Continue  mL/h  -Continue to trend while  admitted  -Nephrology consult  -Renal US without hydronephrosis      HTN  -continue on home amlodipine, metoprolol and losartan  -Hold losartan, chlorthalidone given his MARICARMEN     HLD  -continue on statin     DM-II   -A1C of 6.0 on 8/20/24  -continue home jardiance     Anemia  -Hgb 10.2, stable and at near baseline  -Transfuse RBC for hemoglobin less than 7 or greater than 7 with signs of bleeding and hemodynamic instability     Code Status: Full Code     DVT ppx: Per primary      Please see orders for more complete plan    Thank you for involving us in the care of this very pleasant patient. We will follow along with you while they remain admitted. Please contact Hospitalist service if any clarification needed.     Zenaida Dillon PA-C

## 2024-09-25 NOTE — PROGRESS NOTES
Gilberto White is a 64 y.o. male on day 3 of admission presenting with Acute appendicitis with appendiceal abscess.      Subjective   .Interval History    Patient is doing  well  Sitting in chair    ROS  Denies chest pain, shortness of breath,  Nausea, vomiting , diarrhea, fever or chills.     No change in Past Medical History        Objective          Vitals 24HR  Heart Rate:  [71-82]   Temp:  [36.2 °C (97.1 °F)-37.1 °C (98.7 °F)]   Resp:  [15-19]   BP: (111-121)/(59-66)   SpO2:  [94 %-97 %]     .  Vitals:    09/24/24 1645 09/24/24 2035 09/25/24 0112 09/25/24 0451   BP: 121/66 117/65 118/61 112/59   BP Location: Left arm Left arm Left arm Left arm   Patient Position: Sitting Sitting Lying Lying   Pulse: 76 75 71 71   Resp: 19 18 15 18   Temp: 36.2 °C (97.1 °F) 36.4 °C (97.6 °F) 36.6 °C (97.9 °F) 37.1 °C (98.7 °F)   TempSrc: Temporal Temporal Temporal Temporal   SpO2: 96% 96% 94% 96%   Weight:       Height:            Intake/Output last 3 Shifts:    Intake/Output Summary (Last 24 hours) at 9/25/2024 1002  Last data filed at 9/25/2024 0955  Gross per 24 hour   Intake 900 ml   Output 1160 ml   Net -260 ml     .  Results from last 7 days   Lab Units 09/25/24 0413 09/24/24 0403 09/23/24  0452   SODIUM mmol/L 139 140 139   POTASSIUM mmol/L 4.0 4.2 4.3   CHLORIDE mmol/L 107 109* 109*   CO2 mmol/L 24 24 19*   BUN mg/dL 66* 69* 76*   CREATININE mg/dL 2.37* 2.40* 2.24*   EGFR mL/min/1.73m*2 30* 30* 32*   GLUCOSE mg/dL 129* 145* 173*   CALCIUM mg/dL 8.0* 7.5* 7.7*    .  Results from last 7 days   Lab Units 09/25/24 0413 09/24/24  0403 09/23/24  0452   WBC AUTO x10*3/uL 12.9* 13.1* 13.3*   HEMOGLOBIN g/dL 9.4* 9.2* 10.0*   HEMATOCRIT % 28.1* 26.6* 29.2*   PLATELETS AUTO x10*3/uL 240 204 215      Physical Exam  Constitutional:       Appearance: Normal appearance.   HENT:      Mouth/Throat:      Mouth: Mucous membranes are moist.      Pharynx: Oropharynx is clear.   Eyes:      Extraocular Movements: Extraocular movements  intact.      Pupils: Pupils are equal, round, and reactive to light.   Cardiovascular:      Rate and Rhythm: Normal rate and regular rhythm.      Pulses: Normal pulses.      Heart sounds: Normal heart sounds.   Pulmonary:      Effort: Pulmonary effort is normal.      Breath sounds: Normal breath sounds.   Abdominal:      General: Abdomen is flat. Bowel sounds are normal.      Palpations: Abdomen is soft.   Musculoskeletal:      Right lower leg: Edema present.      Left lower leg: Edema present.   Skin:     General: Skin is warm and dry.      Capillary Refill: Capillary refill takes 2 to 3 seconds.   Neurological:      Mental Status: He is alert and oriented to person, place, and time. Mental status is at baseline.   Psychiatric:         Mood and Affect: Mood normal.         Behavior: Behavior normal.         Relevant Results               Assessment/Plan      MARICARMEN/ATN 2/2 Acute perforated appendicitis with abscess s/p appendectomy  (N17.0)  Azotemia (R79.89)  Metabolic acidosis E87.20  CKD3a  N18.31  Diabetes type 2   HTN  Hypocalcemia   Anemia in the setting CKD3a  HLD  Leukocytosis with acute appendicitis and appendiceal abscess K35.33    Recommendations   Scr about the same, Acute kidney injury consistent with acute tubular necrosis baseline creatinine 1.5-1.8 peak creatinine 2.40  Hypertension with hypertensive chronic kidney disease  Will discontinue IV fluids, patient tolerating p.o. intake  Agree with holding SGLT2 inhibitor therapy as well at this time.   Your rx for appendicitis   Acidosis improved    Assessment & Plan  Acute appendicitis with appendiceal abscess               .Thank you for the consult and the opportunity to participate inn the care of this patient. Please do not hesitate to contact us with any questions or concern  SASHA Ortega, CNP  [facility]  [phone]   SASHA Fallon-CNP

## 2024-09-25 NOTE — PROGRESS NOTES
Occupational Therapy    OT Treatment    Patient Name: Gilberto White  MRN: [id number]  Department: [facility] [address]  Room: [address] [place] [address]-  Today's Date: 9/25/2024  Time Calculation  Start Time: 0900  Stop Time: 0917  Time Calculation (min): 17 min        Assessment:  End of Session Communication: Bedside nurse, PCT/NA/CTA  End of Session Patient Position: Up in room (RN notifed)     Plan:  Treatment Interventions: ADL retraining, Functional transfer training, UE strengthening/ROM, Endurance training  OT Frequency: 4 times per week  OT Discharge Recommendations: No OT needed after discharge  OT Recommended Transfer Status: Stand by assist  OT - OK to Discharge: Yes ((when medically approp.))  Treatment Interventions: ADL retraining, Functional transfer training, UE strengthening/ROM, Endurance training    Subjective   Previous Visit Info:  OT Last Visit  OT Received On: 09/25/24  General:  General  Prior to Session Communication: Bedside nurse  Patient Position Received: Up in chair, Alarm off, not on at start of session  Preferred Learning Style: verbal  General Comment: pt agreeable to OT tx session  Precautions:  Medical Precautions: Fall precautions  Post-Surgical Precautions: Abdominal surgery precautions      Pain:  Pain Assessment  Pain Assessment: 0-10  0-10 (Numeric) Pain Score: 0 - No pain  Pain Type: Acute pain, Surgical pain  Pain Location: Abdomen    Objective    Cognition:  Cognition  Overall Cognitive Status: Within Functional Limits  Orientation Level: Oriented X4    Functional Standing Tolerance:  Time: 2-3 min standing bouts  Activity: pt tolerated standing bouts well with no DME and close supervision. no LOB noted  Bed Mobility/Transfers: Transfers  Transfer: Yes  Transfer 1  Transfer From 1: Chair with arms to  Transfer to 1: Stand  Technique 1: Sit to stand  Transfer Level of Assistance 1: Independent  Transfers 2  Technique 2: Stand to sit  Transfer Level of Assistance 2: Independent      Functional  Mobility:  Functional Mobility  Functional Mobility Performed: Yes  Functional Mobility 1  Comments 1: pt achieved over household distance of functional mobility with close supv to manage IV pole  Sitting Balance:  Static Sitting Balance  Static Sitting-Level of Assistance: Independent  Dynamic Sitting Balance  Dynamic Sitting-Level of Assistance: Independent  Standing Balance:  Static Standing Balance  Static Standing-Level of Assistance: Distant supervision  Dynamic Standing Balance  Dynamic Standing-Level of Assistance: Close supervision       Therapy/Activity:      Therapeutic Activity  Therapeutic Activity Performed: Yes  Therapeutic Activity 1: pt displays good dynamic balance this date with functional mobility with no DME to collect ADL items for grooming. No LOB  noted      Outcome Measures:[facility] Daily Activity  Putting on and taking off regular lower body clothing: None  Bathing (including washing, rinsing, drying): None  Putting on and taking off regular upper body clothing: None  Toileting, which includes using toilet, bedpan or urinal: None  Taking care of personal grooming such as brushing teeth: None  Eating Meals: None  Daily Activity - Total Score: 24        Education Documentation  Precautions, taught by LILIANA Singer at 9/25/2024  9:26 AM.  Learner: Patient  Readiness: Acceptance  Method: Explanation  Response: Verbalizes Understanding    Precautions, taught by LILIANA Singer at 9/25/2024  9:26 AM.  Learner: Patient  Readiness: Acceptance  Method: Explanation  Response: Verbalizes Understanding    Precautions, taught by LILIANA Singer at 9/24/2024  2:51 PM.  Learner: Patient  Readiness: Acceptance  Method: Explanation  Response: Verbalizes Understanding    Education Comments  No comments found.        OP EDUCATION:       Goals:  Encounter Problems       Encounter Problems (Active)       ADLs       SBA LB dressing  (Progressing)       Start:  09/23/24    Expected End:  09/30/24                MOBILITY       Supervised [facility]. mobility  (Progressing)       Start:  09/23/24    Expected End:  09/30/24

## 2024-09-25 NOTE — PROGRESS NOTES
I met with pt who confirmed DC plan remains to go home.  He denies any additional needs at this time.  Will continue to follow.

## 2024-09-25 NOTE — PROGRESS NOTES
Physical Therapy  Physical Therapy Treatment    Patient Name: Gilberto White  MRN: [id number]  Department: [address]  Room: [address] [place] [address]  Today's Date: 9/25/2024  Time Calculation  Start Time: 0816  Stop Time: 0827  Time Calculation (min): 11 min     Assessment/Plan   PT Plan  Treatment/Interventions: Bed mobility, Transfer training, Gait training, Stair training, Endurance training  PT Plan: Ongoing PT  PT Frequency: 5 times per week  PT Discharge Recommendations: No PT needed after discharge  PT Recommended Transfer Status: Assist x1 (ABDOMINAL PRECAUTIONS)  PT - OK to Discharge: Yes (WHEN MEDICALLY CLEARED)    General Visit Information:   PT  Visit  PT Received On: 09/25/24  Response to Previous Treatment: Patient with no complaints from previous session.    Reason for Referral: appendicitis s/p appendectomy 9/22    Pain:  No pain    Cognition:  Oriented X4    Activity Tolerance:  Activity Tolerance  Endurance: Tolerates 30+ min exercise without fatigue    Treatments:  Bed Mobility  Supine to sitting: Independent  Scooting: Independent    Transfers  Sit to stand: Independent  Stand to sit: Independent    Ambulation/Gait Training  100 feet x1rep with no device, Independent       Pt has met goals, is Independent with mobility. Pt up ad daniel. Spoke with PT. Will discharge pt from PT services.     Pt in chair following tx    Outcome Measures:  [facility] Basic Mobility  Turning from your back to your side while in a flat bed without using bedrails: None  Moving from lying on your back to sitting on the side of a flat bed without using bedrails: None  Moving to and from bed to chair (including a wheelchair): None  Standing up from a chair using your arms (e.g. wheelchair or bedside chair): None  To walk in hospital room: None  Climbing 3-5 steps with railing: None  Basic Mobility - Total Score: 24    Education Documentation  Mobility Training, taught by Herman Garcia PTA at 9/25/2024  8:30 AM.  Learner: Patient  Readiness:  Acceptance  Method: Explanation, Demonstration  Response: Demonstrated Understanding, Verbalizes Understanding    Education Comments  No comments found.        OP EDUCATION:       Encounter Problems       Encounter Problems (Active)       Mobility       STG - Patient will ambulate (Progressing)       Start:  09/23/24    Expected End:  09/26/24       300 FT SBA         STG - Patient will ascend and descend six stairs (Progressing)       Start:  09/23/24    Expected End:  09/26/24       RAILS SBA            PT Transfers       STG - Patient to transfer to and from sit to supine (Progressing)       Start:  09/23/24    Expected End:  09/25/24       SBA USING LOG ROLLING HB FLAT NO RAILS         STG - Patient will transfer sit to and from stand (Met)       Start:  09/23/24    Expected End:  09/25/24    Resolved:  09/24/24    SPLINTING INCISION SITE SBA            Pain - Adult

## 2024-09-25 NOTE — PROGRESS NOTES
"GENERAL SURGERY PROGRESS NOTE    Gilberto White   1960   [id number]     Gilberto White is a 64 y.o. male on day 3 of admission presenting with Acute appendicitis with appendiceal abscess.      Subjective  Pt EDMUND.  NAEO.  Minimal abdominal pain in RUQ.  MIKI drain with serosanguinous output.  No bowel movement yet but passing flatus.  Pain improving, appetite increased    Review of Systems   Constitutional:  Negative for chills, fatigue and fever.   Respiratory:  Negative for cough, chest tightness and shortness of breath.    Gastrointestinal:  Positive for abdominal pain. Negative for constipation, nausea and vomiting.   Neurological:  Negative for dizziness, light-headedness and headaches.       Objective    Last Recorded Vitals  Blood pressure 112/59, pulse 71, temperature 37.1 °C (98.7 °F), temperature source Temporal, resp. rate 18, height 1.727 m (5' 8\"), weight 80.7 kg (178 lb), SpO2 96%.    Intake/Output last 3 Shifts:  I/O last 3 completed shifts:  In: 1120 (13.9 mL/kg) [P.O.:920; IV Piggyback:200]  Out: 1145 (14.2 mL/kg) [Urine:500 (0.2 mL/kg/hr); Drains:645]  Weight: 80.7 kg     Gen: NAD.  A&Ox3  HEENT: NC/AT.  Moist mucous membranes.  Neck: Normal range of motion.  CV: Regular rate.  Chest: Normal chest rise.  Normal respiratory effort.  Abdomen: Soft.  No abdominal distention.  Tenderness in RUQ and along incision.  Miki drain with serosanguinous output.    Extremities: No edema.  Moving all extremities.    Relevant Results  Results for orders placed or performed during the hospital encounter of 09/22/24 (from the past 24 hour(s))   POCT GLUCOSE   Result Value Ref Range    POCT Glucose 183 (H) 74 - 99 mg/dL   POCT GLUCOSE   Result Value Ref Range    POCT Glucose 130 (H) 74 - 99 mg/dL   POCT GLUCOSE   Result Value Ref Range    POCT Glucose 144 (H) 74 - 99 mg/dL   CBC   Result Value Ref Range    WBC 12.9 (H) 4.4 - 11.3 x10*3/uL    nRBC 0.0 0.0 - 0.0 /100 WBCs    RBC 2.95 (L) 4.50 - 5.90 x10*6/uL    " Hemoglobin 9.4 (L) 13.5 - 17.5 g/dL    Hematocrit 28.1 (L) 41.0 - 52.0 %    MCV 95 80 - 100 fL    MCH 31.9 26.0 - 34.0 pg    MCHC 33.5 32.0 - 36.0 g/dL    RDW 11.9 11.5 - 14.5 %    Platelets 240 150 - 450 x10*3/uL   Basic Metabolic Panel   Result Value Ref Range    Glucose 129 (H) 74 - 99 mg/dL    Sodium 139 136 - 145 mmol/L    Potassium 4.0 3.5 - 5.3 mmol/L    Chloride 107 98 - 107 mmol/L    Bicarbonate 24 21 - 32 mmol/L    Anion Gap 12 10 - 20 mmol/L    Urea Nitrogen 66 (H) 6 - 23 mg/dL    Creatinine 2.37 (H) 0.50 - 1.30 mg/dL    eGFR 30 (L) >60 mL/min/1.73m*2    Calcium 8.0 (L) 8.6 - 10.3 mg/dL   Magnesium   Result Value Ref Range    Magnesium 3.28 (H) 1.60 - 2.40 mg/dL   POCT GLUCOSE   Result Value Ref Range    POCT Glucose 129 (H) 74 - 99 mg/dL       US renal complete    Result Date: 9/24/2024  Interpreted By:  Reanna Godinez, STUDY: US RENAL COMPLETE; 9/24/2024 11:22 am   INDICATION: Acute renal insufficiency   COMPARISON: 09/05/2020   ACCESSION NUMBER(S): [id number]   ORDERING CLINICIAN: TOSHIA FRANCOIS   TECHNIQUE: Grayscale and color Doppler imaging of the kidneys.   FINDINGS: The right kidney measures 9.7 cm in length. The left kidney measures 10.6 cm in length. There is no shadowing calculus, hydronephrosis, or solid mass identified. Known renal calculi seen on CT study of 09/22/2024 are difficult to visualize on this examination. The renal cortical thickness and echogenicity is normal. A small amount of perinephric fluid is seen bilaterally.   Urinary bladder is unremarkable in appearance.       Small amount of perinephric fluid bilaterally.   No hydronephrosis or renal atrophy.   Known renal calculi seen on CT study done 2 days are difficult to visualize on this examination   MACRO: None.   Signed by: Reanna Godinez 9/24/2024 12:45 PM Dictation workstation:   [id number]    Electrocardiogram, 12-lead PRN ACS symptoms    Result Date: 9/23/2024  Sinus rhythm Confirmed by Mariel Whalen ([id number]) on  9/23/2024 5:50:21 PM    CT abdomen pelvis wo IV contrast    Result Date: 9/22/2024  Interpreted By:  Anna Stephens, STUDY: CT ABDOMEN PELVIS WO IV CONTRAST;  9/22/2024 3:08 pm   INDICATION: Signs/Symptoms:right flank and rlq pain, tender, hx kidney stones.   COMPARISON: 09/05/2023   ACCESSION NUMBER(S): [id number]   ORDERING CLINICIAN: ANGELINA MCFADDEN   TECHNIQUE: CT of the abdomen and pelvis was performed. Contiguous axial images were obtained at 3 mm slice thickness through the abdomen and pelvis. Coronal and sagittal reconstructions at 3 mm slice thickness were performed.  No intravenous or oral contrast agents were administered.   FINDINGS: Please note that the evaluation of vessels, lymph nodes and organs is limited without intravenous contrast.   LOWER CHEST: There are bands of atelectasis in the lung bases and right middle lobe. No pleural effusion. Gynecomastia noted.   ABDOMEN:   LIVER: Normal size, contour, and density. No focal lesion evident.   GALLBLADDER: Normal-size. Hazy increased density in the dependent portion of the gallbladder, likely sludge. No definite radiopaque gallstone.   BILE DUCTS: Normal caliber.   PANCREAS: The pancreas appears within normal limits, no evidence of pancreatitis or mass.   SPLEEN: Normal size.   ADRENAL GLANDS: Within normal limits.   KIDNEYS AND URETERS: Bilateral perinephric edema. No hydronephrosis. Bilateral nonobstructing calculi are present. The largest is in the right renal pelvis, triangular shaped, 1.3 cm. No ureteral stone. No focal lesion evident.   PELVIS:   BLADDER: Within normal limits.   REPRODUCTIVE ORGANS: Mild prostate enlargement.   BOWEL: The stomach is unremarkable. The small bowel is normal in caliber without evidence of focal wall thickening or obstruction. There is no evidence of focal wall thickening or dilatation of the large bowel. Markedly abnormal appendix is diffusely thickened and dilated, 1.8 cm diameter, with significant surrounding fat  stranding, extends along right paracolic gutter up to near the gallbladder fossa. There is calcific density column of material in the proximal appendix which may represent 1 or more appendicoliths although difficult to measure. Significant fat stranding present around the appendix, there is no definite fluid collection or abscess. No evidence of perforation.   VESSELS: Normal caliber aorta. Atherosclerosis present.   PERITONEUM/RETROPERITONEUM/LYMPH NODES: Retroperitoneal edema bilateral perinephric space and along the paracolic gutters. Edema surrounds the dilated appendix, however no significant free fluid. No pneumoperitoneum. No adenopathy.   BONES AND ABDOMINAL WALL: No evidence of acute fracture. No suspicious osseous lesions are identified.Mild degenerative changes in the spine and hips. Small fat containing left inguinal hernia.       1.  Acute appendicitis. There is a markedly dilated (1.8 cm) and thick-walled appendix containing some radiopaque material proximally, suspect there is probably an appendicolith within although difficult to measure. Periappendiceal fat stranding is present, however no definite abscess or evidence of perforation. 2. Bilateral nonobstructing nephrolithiasis. Perinephric edema is symmetric and nonspecific. Correlate for any clinical evidence of UTI. 3. Ancillary findings as above.     Signed by: Anna Stephens 9/22/2024 4:09 PM Dictation workstation:   [id number]      Assessment and Plan  Principal Problem:    Acute appendicitis with appendiceal abscess    64 y.o. male with acute appendicitis and MARICARMEN    Plan:   - Nephro consulted for MARICARMEN   - Continue IV maintenance fluids until adequate PO intake.   - Advance diet to full liquids.    - PRN pain and nausea medications.   - Continue to monitor MIKI drain.  - Continue IV Zosyn.      Discussed with Dr. Davis.      PUJA Huitron DO, PGY-3

## 2024-09-25 NOTE — CARE PLAN
Problem: Pain - Adult  Goal: Verbalizes/displays adequate comfort level or baseline comfort level  Outcome: Progressing  Flowsheets (Taken 9/25/2024 8811)  Verbalizes/displays adequate comfort level or baseline comfort level:   Administer analgesics based on type and severity of pain and evaluate response   Implement non-pharmacological measures as appropriate and evaluate response   Assess pain using appropriate pain scale   Consider cultural and social influences on pain and pain management     Problem: Safety - Adult  Goal: Free from fall injury  Outcome: Progressing     Problem: Discharge Planning  Goal: Discharge to home or other facility with appropriate resources  Outcome: Progressing     Problem: Chronic Conditions and Co-morbidities  Goal: Patient's chronic conditions and co-morbidity symptoms are monitored and maintained or improved  Outcome: Progressing     Problem: Diabetes  Goal: Achieve decreasing blood glucose levels by end of shift  Outcome: Progressing  Goal: Increase stability of blood glucose readings by end of shift  Outcome: Progressing  Goal: Decrease in ketones present in urine by end of shift  Outcome: Progressing  Goal: Maintain electrolyte levels within acceptable range throughout shift  Outcome: Progressing  Goal: Maintain glucose levels >70mg/dl to <250mg/dl throughout shift  Outcome: Progressing  Goal: No changes in neurological exam by end of shift  Outcome: Progressing  Goal: Learn about and adhere to nutrition recommendations by end of shift  Outcome: Progressing  Goal: Vital signs within normal range for age by end of shift  Outcome: Progressing  Goal: Increase self care and/or family involovement by end of shift  Outcome: Progressing  Goal: Receive DSME education by end of shift  Outcome: Progressing     Problem: Pain  Goal: Takes deep breaths with improved pain control throughout the shift  Outcome: Progressing  Goal: Turns in bed with improved pain control throughout the  shift  Outcome: Progressing  Goal: Walks with improved pain control throughout the shift  Outcome: Progressing  Goal: Performs ADL's with improved pain control throughout shift  Outcome: Progressing  Goal: Participates in PT with improved pain control throughout the shift  Outcome: Progressing  Goal: Free from opioid side effects throughout the shift  Outcome: Progressing  Goal: Free from acute confusion related to pain meds throughout the shift  Outcome: Progressing

## 2024-09-26 LAB
ANION GAP SERPL CALC-SCNC: 16 MMOL/L (ref 10–20)
BUN SERPL-MCNC: 68 MG/DL (ref 6–23)
CALCIUM SERPL-MCNC: 7.5 MG/DL (ref 8.6–10.3)
CHLORIDE SERPL-SCNC: 104 MMOL/L (ref 98–107)
CO2 SERPL-SCNC: 21 MMOL/L (ref 21–32)
CREAT SERPL-MCNC: 2.51 MG/DL (ref 0.5–1.3)
EGFRCR SERPLBLD CKD-EPI 2021: 28 ML/MIN/1.73M*2
ERYTHROCYTE [DISTWIDTH] IN BLOOD BY AUTOMATED COUNT: 11.9 % (ref 11.5–14.5)
GLUCOSE BLD MANUAL STRIP-MCNC: 118 MG/DL (ref 74–99)
GLUCOSE BLD MANUAL STRIP-MCNC: 125 MG/DL (ref 74–99)
GLUCOSE BLD MANUAL STRIP-MCNC: 128 MG/DL (ref 74–99)
GLUCOSE BLD MANUAL STRIP-MCNC: 201 MG/DL (ref 74–99)
GLUCOSE SERPL-MCNC: 119 MG/DL (ref 74–99)
HCT VFR BLD AUTO: 27.3 % (ref 41–52)
HGB BLD-MCNC: 9 G/DL (ref 13.5–17.5)
MAGNESIUM SERPL-MCNC: 3.27 MG/DL (ref 1.6–2.4)
MCH RBC QN AUTO: 31.9 PG (ref 26–34)
MCHC RBC AUTO-ENTMCNC: 33 G/DL (ref 32–36)
MCV RBC AUTO: 97 FL (ref 80–100)
NRBC BLD-RTO: 0 /100 WBCS (ref 0–0)
PLATELET # BLD AUTO: 271 X10*3/UL (ref 150–450)
POTASSIUM SERPL-SCNC: 4 MMOL/L (ref 3.5–5.3)
RBC # BLD AUTO: 2.82 X10*6/UL (ref 4.5–5.9)
SODIUM SERPL-SCNC: 137 MMOL/L (ref 136–145)
WBC # BLD AUTO: 11.9 X10*3/UL (ref 4.4–11.3)

## 2024-09-26 PROCEDURE — 2500000002 HC RX 250 W HCPCS SELF ADMINISTERED DRUGS (ALT 637 FOR MEDICARE OP, ALT 636 FOR OP/ED)

## 2024-09-26 PROCEDURE — 36415 COLL VENOUS BLD VENIPUNCTURE: CPT | Performed by: PHYSICIAN ASSISTANT

## 2024-09-26 PROCEDURE — 85027 COMPLETE CBC AUTOMATED: CPT | Performed by: PHYSICIAN ASSISTANT

## 2024-09-26 PROCEDURE — 99233 SBSQ HOSP IP/OBS HIGH 50: CPT | Performed by: PHYSICIAN ASSISTANT

## 2024-09-26 PROCEDURE — 80048 BASIC METABOLIC PNL TOTAL CA: CPT | Performed by: PHYSICIAN ASSISTANT

## 2024-09-26 PROCEDURE — 1210000001 HC SEMI-PRIVATE ROOM DAILY

## 2024-09-26 PROCEDURE — 83735 ASSAY OF MAGNESIUM: CPT | Performed by: PHYSICIAN ASSISTANT

## 2024-09-26 PROCEDURE — 2500000001 HC RX 250 WO HCPCS SELF ADMINISTERED DRUGS (ALT 637 FOR MEDICARE OP)

## 2024-09-26 PROCEDURE — 82947 ASSAY GLUCOSE BLOOD QUANT: CPT

## 2024-09-26 PROCEDURE — 2500000004 HC RX 250 GENERAL PHARMACY W/ HCPCS (ALT 636 FOR OP/ED): Performed by: PHYSICIAN ASSISTANT

## 2024-09-26 RX ADMIN — PIPERACILLIN SODIUM AND TAZOBACTAM SODIUM 3.38 G: 3; .375 INJECTION, SOLUTION INTRAVENOUS at 23:56

## 2024-09-26 RX ADMIN — PIPERACILLIN SODIUM AND TAZOBACTAM SODIUM 3.38 G: 3; .375 INJECTION, SOLUTION INTRAVENOUS at 17:00

## 2024-09-26 RX ADMIN — AMLODIPINE BESYLATE 5 MG: 5 TABLET ORAL at 09:57

## 2024-09-26 RX ADMIN — ROSUVASTATIN 10 MG: 10 TABLET, FILM COATED ORAL at 20:46

## 2024-09-26 RX ADMIN — PIPERACILLIN SODIUM AND TAZOBACTAM SODIUM 3.38 G: 3; .375 INJECTION, SOLUTION INTRAVENOUS at 04:59

## 2024-09-26 RX ADMIN — PIPERACILLIN SODIUM AND TAZOBACTAM SODIUM 3.38 G: 3; .375 INJECTION, SOLUTION INTRAVENOUS at 09:57

## 2024-09-26 RX ADMIN — METOPROLOL SUCCINATE 25 MG: 25 TABLET, EXTENDED RELEASE ORAL at 20:46

## 2024-09-26 ASSESSMENT — ENCOUNTER SYMPTOMS
FREQUENCY: 0
COUGH: 0
WEAKNESS: 0
ABDOMINAL PAIN: 1
DYSURIA: 0
EYE PAIN: 0
APPETITE CHANGE: 0
HEMATURIA: 0
NAUSEA: 0
BRUISES/BLEEDS EASILY: 0
ABDOMINAL DISTENTION: 0
HEADACHES: 0
TROUBLE SWALLOWING: 0
FATIGUE: 0
SHORTNESS OF BREATH: 0
DIARRHEA: 0
WOUND: 0
PALPITATIONS: 0
VOMITING: 0
BACK PAIN: 0
JOINT SWELLING: 0
CONSTIPATION: 0
CHILLS: 0
FLANK PAIN: 0
NUMBNESS: 0
DIZZINESS: 0
CHEST TIGHTNESS: 0
WHEEZING: 0
FACIAL SWELLING: 0
LIGHT-HEADEDNESS: 0
BLOOD IN STOOL: 0
DIAPHORESIS: 0
FEVER: 0
SORE THROAT: 0
HALLUCINATIONS: 0

## 2024-09-26 ASSESSMENT — PAIN SCALES - GENERAL
PAINLEVEL_OUTOF10: 0 - NO PAIN

## 2024-09-26 ASSESSMENT — COGNITIVE AND FUNCTIONAL STATUS - GENERAL
DAILY ACTIVITIY SCORE: 24
MOBILITY SCORE: 24

## 2024-09-26 ASSESSMENT — PAIN - FUNCTIONAL ASSESSMENT
PAIN_FUNCTIONAL_ASSESSMENT: 0-10

## 2024-09-26 NOTE — CARE PLAN
The patient's goals for the shift include      The clinical goals for the shift include pain control    Problem: Pain - Adult  Goal: Verbalizes/displays adequate comfort level or baseline comfort level  Outcome: Progressing     Problem: Safety - Adult  Goal: Free from fall injury  Outcome: Progressing

## 2024-09-26 NOTE — DISCHARGE INSTRUCTIONS
Discharge Instructions    Incisions  Your incisions are covered with steri-strips (the small white strips). Do not shower for at least 3 days after placement of steri-strips. You may shower with the steri-strips on. They will fall off on their own in 1-2 weeks.  You may take a shower on day of discharge, do not submerge in water till after follow up with surgeon.  Keep the incisions clean and dry.   You may (but do not have to) cover the incisions with gauze and tape.   Wear an abdominal binder as needed for comfort.    Drain Care  You have at least one drain that will require some maintenance at home.   Please practice managing the drain in the hospital prior to discharge. You may ask a family member or friend to also practice managing the drain in the hospital.   Empty the drain 1-3 times a day, depending on how full the collection container is. Measure the volume.  The daily total volume and quality of the drain should be recorded as it assists in determining if and when the drain can be removed.  If there is a 3 way stop-cock on the drain, you should flush the drain daily with sterile saline. The flushes can be obtained at the  [facility]. Once a day, flush 5mL towards the body and 5mL towards the end of the drain. Subtract 10mL from your daily drain total volume.    Pain  Do not drive while taking stronger pain medications (Tramadol, Percocet, Norco, Oxycodone, etc).   You may drive once you feel comfortable without stronger pain medications and can drive without any significant distractions related to your pain or surgical sites.  You may alternate acetaminophen and ibuprofen for pain control, as long as you are able to take either medication.   If you are prescribed a pain patch, each patch is to stay on the skin for 12 hours, then off for 12 hours at a time. You may cut the patch in half to place on either side of the area of concern.    Diet  Resume your normal diet. Your appetite may not  fully return immediately.   Please drink plenty of fluids to maintain hydration.    Physical Activity  Limit physical activity that would involve stretching the incision(s) for 2 weeks.  No lifting over 10-15 lb or strenuous physical activity for at least 3 weeks after surgery. If there is pain with increased physical effort, please resume light duty restrictions.   If you need FMLA, short-term disability, or other paperwork completed, please provide the paperwork or instructions to the office in person    Follow Up  Follow up with Dr. Davis in the office per the instructions above. Please call the office to follow up sooner if there are concerns you would like to discuss.    Call Provider  If you are experiencing fever (100.4F or higher).  If you are experiencing significantly worsening pain, nausea or vomiting.  If the incision(s) appear reddened, swollen or are draining.  If you have very loose or watery bowel movements.  If you have any new concerning symptoms.

## 2024-09-26 NOTE — PROGRESS NOTES
"GENERAL SURGERY PROGRESS NOTE    Gilberto White   1960   [id number]     Gilberto White is a 64 y.o. male on day 4 of admission presenting with Acute appendicitis with appendiceal abscess.      Subjective  Pt EDMUND at bedside.  NAEO.  Overall pain in RUQ is improving. MIKI drain with smudgy serosanguinous output.  No bowel movement yet but increased flatus.  Tolerating diet with no nausea or vomiting.     Review of Systems   Constitutional:  Negative for chills, fatigue and fever.   Respiratory:  Negative for cough, chest tightness and shortness of breath.    Cardiovascular:  Negative for chest pain and palpitations.   Gastrointestinal:  Positive for abdominal pain. Negative for abdominal distention, constipation, diarrhea, nausea and vomiting.   Neurological:  Negative for dizziness, light-headedness and headaches.       Objective    Last Recorded Vitals  Blood pressure 102/65, pulse 72, temperature 36.4 °C (97.5 °F), temperature source Temporal, resp. rate 16, height 1.727 m (5' 8\"), weight 80.7 kg (178 lb), SpO2 97%.    Intake/Output last 3 Shifts:  I/O last 3 completed shifts:  In: 6055 (75 mL/kg) [P.O.:1600; I.V.:4255 (52.7 mL/kg); IV Piggyback:200]  Out: 1221 (15.1 mL/kg) [Urine:501 (0.2 mL/kg/hr); Drains:720]  Weight: 80.7 kg     Gen: NAD.  A&Ox3  HEENT: NC/AT.  Moist mucous membranes.  Neck: Normal range of motion.  CV: Regular rate.  Chest: Normal chest rise.  Normal respiratory effort.  Abdomen: Soft.  No abdominal distention.  Minimal tenderness to palpation in RUQ and along incision.  MIKI drain with smudgy serosanguinous output.    Extremities: No edema.  Moving all extremities.    Relevant Results  Results for orders placed or performed during the hospital encounter of 09/22/24 (from the past 24 hour(s))   POCT GLUCOSE   Result Value Ref Range    POCT Glucose 173 (H) 74 - 99 mg/dL   POCT GLUCOSE   Result Value Ref Range    POCT Glucose 111 (H) 74 - 99 mg/dL   POCT GLUCOSE   Result Value Ref Range    " POCT Glucose 141 (H) 74 - 99 mg/dL   CBC   Result Value Ref Range    WBC 11.9 (H) 4.4 - 11.3 x10*3/uL    nRBC 0.0 0.0 - 0.0 /100 WBCs    RBC 2.82 (L) 4.50 - 5.90 x10*6/uL    Hemoglobin 9.0 (L) 13.5 - 17.5 g/dL    Hematocrit 27.3 (L) 41.0 - 52.0 %    MCV 97 80 - 100 fL    MCH 31.9 26.0 - 34.0 pg    MCHC 33.0 32.0 - 36.0 g/dL    RDW 11.9 11.5 - 14.5 %    Platelets 271 150 - 450 x10*3/uL   Basic Metabolic Panel   Result Value Ref Range    Glucose 119 (H) 74 - 99 mg/dL    Sodium 137 136 - 145 mmol/L    Potassium 4.0 3.5 - 5.3 mmol/L    Chloride 104 98 - 107 mmol/L    Bicarbonate 21 21 - 32 mmol/L    Anion Gap 16 10 - 20 mmol/L    Urea Nitrogen 68 (H) 6 - 23 mg/dL    Creatinine 2.51 (H) 0.50 - 1.30 mg/dL    eGFR 28 (L) >60 mL/min/1.73m*2    Calcium 7.5 (L) 8.6 - 10.3 mg/dL   Magnesium   Result Value Ref Range    Magnesium 3.27 (H) 1.60 - 2.40 mg/dL   POCT GLUCOSE   Result Value Ref Range    POCT Glucose 118 (H) 74 - 99 mg/dL       US renal complete    Result Date: 9/24/2024  Interpreted By:  Reanna Godinez, STUDY: US RENAL COMPLETE; 9/24/2024 11:22 am   INDICATION: Acute renal insufficiency   COMPARISON: 09/05/2020   ACCESSION NUMBER(S): [id number]   ORDERING CLINICIAN: TOSHIA FRANCOIS   TECHNIQUE: Grayscale and color Doppler imaging of the kidneys.   FINDINGS: The right kidney measures 9.7 cm in length. The left kidney measures 10.6 cm in length. There is no shadowing calculus, hydronephrosis, or solid mass identified. Known renal calculi seen on CT study of 09/22/2024 are difficult to visualize on this examination. The renal cortical thickness and echogenicity is normal. A small amount of perinephric fluid is seen bilaterally.   Urinary bladder is unremarkable in appearance.       Small amount of perinephric fluid bilaterally.   No hydronephrosis or renal atrophy.   Known renal calculi seen on CT study done 2 days are difficult to visualize on this examination   MACRO: None.   Signed by: Reanna Godinez 9/24/2024 12:45 PM  Dictation workstation:   [id number]    Electrocardiogram, 12-lead PRN ACS symptoms    Result Date: 9/23/2024  Sinus rhythm Confirmed by Mariel Whalen ([id number]) on 9/23/2024 5:50:21 PM    CT abdomen pelvis wo IV contrast    Result Date: 9/22/2024  Interpreted By:  Anna Stephens, STUDY: CT ABDOMEN PELVIS WO IV CONTRAST;  9/22/2024 3:08 pm   INDICATION: Signs/Symptoms:right flank and rlq pain, tender, hx kidney stones.   COMPARISON: 09/05/2023   ACCESSION NUMBER(S): [id number]   ORDERING CLINICIAN: ANGELINA MCFADDEN   TECHNIQUE: CT of the abdomen and pelvis was performed. Contiguous axial images were obtained at 3 mm slice thickness through the abdomen and pelvis. Coronal and sagittal reconstructions at 3 mm slice thickness were performed.  No intravenous or oral contrast agents were administered.   FINDINGS: Please note that the evaluation of vessels, lymph nodes and organs is limited without intravenous contrast.   LOWER CHEST: There are bands of atelectasis in the lung bases and right middle lobe. No pleural effusion. Gynecomastia noted.   ABDOMEN:   LIVER: Normal size, contour, and density. No focal lesion evident.   GALLBLADDER: Normal-size. Hazy increased density in the dependent portion of the gallbladder, likely sludge. No definite radiopaque gallstone.   BILE DUCTS: Normal caliber.   PANCREAS: The pancreas appears within normal limits, no evidence of pancreatitis or mass.   SPLEEN: Normal size.   ADRENAL GLANDS: Within normal limits.   KIDNEYS AND URETERS: Bilateral perinephric edema. No hydronephrosis. Bilateral nonobstructing calculi are present. The largest is in the right renal pelvis, triangular shaped, 1.3 cm. No ureteral stone. No focal lesion evident.   PELVIS:   BLADDER: Within normal limits.   REPRODUCTIVE ORGANS: Mild prostate enlargement.   BOWEL: The stomach is unremarkable. The small bowel is normal in caliber without evidence of focal wall thickening or obstruction. There is no evidence of focal  wall thickening or dilatation of the large bowel. Markedly abnormal appendix is diffusely thickened and dilated, 1.8 cm diameter, with significant surrounding fat stranding, extends along right paracolic gutter up to near the gallbladder fossa. There is calcific density column of material in the proximal appendix which may represent 1 or more appendicoliths although difficult to measure. Significant fat stranding present around the appendix, there is no definite fluid collection or abscess. No evidence of perforation.   VESSELS: Normal caliber aorta. Atherosclerosis present.   PERITONEUM/RETROPERITONEUM/LYMPH NODES: Retroperitoneal edema bilateral perinephric space and along the paracolic gutters. Edema surrounds the dilated appendix, however no significant free fluid. No pneumoperitoneum. No adenopathy.   BONES AND ABDOMINAL WALL: No evidence of acute fracture. No suspicious osseous lesions are identified.Mild degenerative changes in the spine and hips. Small fat containing left inguinal hernia.       1.  Acute appendicitis. There is a markedly dilated (1.8 cm) and thick-walled appendix containing some radiopaque material proximally, suspect there is probably an appendicolith within although difficult to measure. Periappendiceal fat stranding is present, however no definite abscess or evidence of perforation. 2. Bilateral nonobstructing nephrolithiasis. Perinephric edema is symmetric and nonspecific. Correlate for any clinical evidence of UTI. 3. Ancillary findings as above.     Signed by: Anna Stephens 9/22/2024 4:09 PM Dictation workstation:   [id number]      Assessment and Plan  Principal Problem:    Acute appendicitis with appendiceal abscess    64 y.o. male with acute appendicitis and MARICARMEN.     Plan:   - Continue to advance diet as tolerated.   - PRN pain and nausea medications.   - Continue to monitor abdominal exam and MIKI drain.   - Nephro following for elevated cre, likely 2/2 ATN, continue hydration  - encourage  OOB and ambulation  - Continue IV Zosyn.     Discussed with Dr. Davis.     PUJA Huitron DO, PGY-3

## 2024-09-26 NOTE — PROGRESS NOTES
Gilberto White is a 64 y.o. male on day 4 of admission presenting with Acute appendicitis with appendiceal abscess.      Subjective   .Interval History    Patient is doing  well  Sitting in chair    ROS  Denies chest pain, shortness of breath,  Nausea, vomiting , diarrhea, fever or chills.     No change in Past Medical History        Objective          Vitals 24HR  Heart Rate:  [70-78]   Temp:  [36.3 °C (97.3 °F)-37 °C (98.6 °F)]   Resp:  [16-18]   BP: (102-138)/(65-74)   SpO2:  [97 %-99 %]     .  Vitals:    09/25/24 1751 09/25/24 2109 09/26/24 0530 09/26/24 0846   BP: 137/71 129/74 102/65 138/72   BP Location: Left arm   Left arm   Patient Position: Sitting Lying Lying Sitting   Pulse: 78 76 72 70   Resp: 18 17 16 18   Temp: 37 °C (98.6 °F) 36.7 °C (98 °F) 36.4 °C (97.5 °F) 36.6 °C (97.9 °F)   TempSrc: Temporal Temporal Temporal Temporal   SpO2: 97% 97% 97% 99%   Weight:       Height:            Intake/Output last 3 Shifts:    Intake/Output Summary (Last 24 hours) at 9/26/2024 1215  Last data filed at 9/26/2024 0846  Gross per 24 hour   Intake 5705 ml   Output 76 ml   Net 5629 ml     .  Results from last 7 days   Lab Units 09/26/24 0459 09/25/24  0413 09/24/24  0403   SODIUM mmol/L 137 139 140   POTASSIUM mmol/L 4.0 4.0 4.2   CHLORIDE mmol/L 104 107 109*   CO2 mmol/L 21 24 24   BUN mg/dL 68* 66* 69*   CREATININE mg/dL 2.51* 2.37* 2.40*   EGFR mL/min/1.73m*2 28* 30* 30*   GLUCOSE mg/dL 119* 129* 145*   CALCIUM mg/dL 7.5* 8.0* 7.5*    .  Results from last 7 days   Lab Units 09/26/24  0459 09/25/24  0413 09/24/24  0403   WBC AUTO x10*3/uL 11.9* 12.9* 13.1*   HEMOGLOBIN g/dL 9.0* 9.4* 9.2*   HEMATOCRIT % 27.3* 28.1* 26.6*   PLATELETS AUTO x10*3/uL 271 240 204      Physical Exam  Constitutional:       Appearance: Normal appearance.   HENT:      Mouth/Throat:      Mouth: Mucous membranes are moist.      Pharynx: Oropharynx is clear.   Eyes:      Extraocular Movements: Extraocular movements intact.      Pupils: Pupils  are equal, round, and reactive to light.   Cardiovascular:      Rate and Rhythm: Normal rate and regular rhythm.      Pulses: Normal pulses.      Heart sounds: Normal heart sounds.   Pulmonary:      Effort: Pulmonary effort is normal.      Breath sounds: Normal breath sounds.   Abdominal:      General: Abdomen is flat. Bowel sounds are normal.      Palpations: Abdomen is soft.   Musculoskeletal:      Right lower leg: Edema present.      Left lower leg: Edema present.   Skin:     General: Skin is warm and dry.      Capillary Refill: Capillary refill takes 2 to 3 seconds.   Neurological:      Mental Status: He is alert and oriented to person, place, and time. Mental status is at baseline.   Psychiatric:         Mood and Affect: Mood normal.         Behavior: Behavior normal.           Assessment/Plan      MARICARMEN/ATN 2/2 Acute perforated appendicitis with abscess s/p appendectomy  (N17.0)  Azotemia (R79.89)  Metabolic acidosis E87.20  CKD3a  N18.31  Diabetes type 2   HTN  Hypocalcemia   Anemia in the setting CKD3a  HLD  Leukocytosis with acute appendicitis and appendiceal abscess K35.33    Recommendations   Scr about the same, Acute kidney injury consistent with acute tubular necrosis baseline creatinine 1.5-1.8 peak creatinine 2.40  Hypertension with hypertensive chronic kidney disease   No edema   Agree with holding SGLT2 inhibitor therapy as well at this time.   Your rx for appendicitis   Acidosis improved   Yasmani Wilkes MD (PGY2): Neurosurgery aware of patient. Attending MD Godfrey.  PT signed out to me in stable condition pending med 66 yo male seen 3 days ago for R visual field loss and HA and found ot have an intracranial mass with vasogenic edema.  Following signout pt accepted for admission in hemodynamically stable condition.

## 2024-09-27 LAB
ANION GAP SERPL CALC-SCNC: 12 MMOL/L (ref 10–20)
APPEARANCE UR: ABNORMAL
BASOPHILS # BLD AUTO: 0.03 X10*3/UL (ref 0–0.1)
BASOPHILS NFR BLD AUTO: 0.3 %
BILIRUB UR STRIP.AUTO-MCNC: NEGATIVE MG/DL
BUN SERPL-MCNC: 67 MG/DL (ref 6–23)
CALCIUM SERPL-MCNC: 7.4 MG/DL (ref 8.6–10.3)
CHLORIDE SERPL-SCNC: 107 MMOL/L (ref 98–107)
CO2 SERPL-SCNC: 23 MMOL/L (ref 21–32)
COLOR UR: ABNORMAL
CREAT SERPL-MCNC: 2.69 MG/DL (ref 0.5–1.3)
EGFRCR SERPLBLD CKD-EPI 2021: 26 ML/MIN/1.73M*2
EOSINOPHIL # BLD AUTO: 0.2 X10*3/UL (ref 0–0.7)
EOSINOPHIL NFR BLD AUTO: 2.1 %
ERYTHROCYTE [DISTWIDTH] IN BLOOD BY AUTOMATED COUNT: 11.7 % (ref 11.5–14.5)
ERYTHROCYTE [DISTWIDTH] IN BLOOD BY AUTOMATED COUNT: 11.7 % (ref 11.5–14.5)
GLUCOSE BLD MANUAL STRIP-MCNC: 110 MG/DL (ref 74–99)
GLUCOSE BLD MANUAL STRIP-MCNC: 116 MG/DL (ref 74–99)
GLUCOSE BLD MANUAL STRIP-MCNC: 137 MG/DL (ref 74–99)
GLUCOSE BLD MANUAL STRIP-MCNC: 196 MG/DL (ref 74–99)
GLUCOSE SERPL-MCNC: 109 MG/DL (ref 74–99)
GLUCOSE UR STRIP.AUTO-MCNC: ABNORMAL MG/DL
HCT VFR BLD AUTO: 27.1 % (ref 41–52)
HCT VFR BLD AUTO: 27.1 % (ref 41–52)
HGB BLD-MCNC: 9.2 G/DL (ref 13.5–17.5)
HGB BLD-MCNC: 9.2 G/DL (ref 13.5–17.5)
IMM GRANULOCYTES # BLD AUTO: 0.08 X10*3/UL (ref 0–0.7)
IMM GRANULOCYTES NFR BLD AUTO: 0.8 % (ref 0–0.9)
KETONES UR STRIP.AUTO-MCNC: ABNORMAL MG/DL
LEUKOCYTE ESTERASE UR QL STRIP.AUTO: NEGATIVE
LYMPHOCYTES # BLD AUTO: 1.31 X10*3/UL (ref 1.2–4.8)
LYMPHOCYTES NFR BLD AUTO: 13.6 %
MAGNESIUM SERPL-MCNC: 3.29 MG/DL (ref 1.6–2.4)
MCH RBC QN AUTO: 31.8 PG (ref 26–34)
MCH RBC QN AUTO: 31.8 PG (ref 26–34)
MCHC RBC AUTO-ENTMCNC: 33.9 G/DL (ref 32–36)
MCHC RBC AUTO-ENTMCNC: 33.9 G/DL (ref 32–36)
MCV RBC AUTO: 94 FL (ref 80–100)
MCV RBC AUTO: 94 FL (ref 80–100)
MONOCYTES # BLD AUTO: 0.55 X10*3/UL (ref 0.1–1)
MONOCYTES NFR BLD AUTO: 5.7 %
MUCOUS THREADS #/AREA URNS AUTO: ABNORMAL /LPF
NEUTROPHILS # BLD AUTO: 7.43 X10*3/UL (ref 1.2–7.7)
NEUTROPHILS NFR BLD AUTO: 77.5 %
NITRITE UR QL STRIP.AUTO: NEGATIVE
NRBC BLD-RTO: 0 /100 WBCS (ref 0–0)
NRBC BLD-RTO: 0 /100 WBCS (ref 0–0)
PH UR STRIP.AUTO: 5 [PH]
PLATELET # BLD AUTO: 260 X10*3/UL (ref 150–450)
PLATELET # BLD AUTO: 260 X10*3/UL (ref 150–450)
POTASSIUM SERPL-SCNC: 3.6 MMOL/L (ref 3.5–5.3)
PROT UR STRIP.AUTO-MCNC: ABNORMAL MG/DL
RBC # BLD AUTO: 2.89 X10*6/UL (ref 4.5–5.9)
RBC # BLD AUTO: 2.89 X10*6/UL (ref 4.5–5.9)
RBC # UR STRIP.AUTO: ABNORMAL /UL
RBC #/AREA URNS AUTO: >20 /HPF
SODIUM SERPL-SCNC: 138 MMOL/L (ref 136–145)
SP GR UR STRIP.AUTO: 1.02
UROBILINOGEN UR STRIP.AUTO-MCNC: NORMAL MG/DL
WBC # BLD AUTO: 9.7 X10*3/UL (ref 4.4–11.3)
WBC # BLD AUTO: 9.7 X10*3/UL (ref 4.4–11.3)
WBC #/AREA URNS AUTO: ABNORMAL /HPF

## 2024-09-27 PROCEDURE — 81001 URINALYSIS AUTO W/SCOPE: CPT | Performed by: REGISTERED NURSE

## 2024-09-27 PROCEDURE — 82947 ASSAY GLUCOSE BLOOD QUANT: CPT

## 2024-09-27 PROCEDURE — 80048 BASIC METABOLIC PNL TOTAL CA: CPT | Performed by: PHYSICIAN ASSISTANT

## 2024-09-27 PROCEDURE — 2500000004 HC RX 250 GENERAL PHARMACY W/ HCPCS (ALT 636 FOR OP/ED): Performed by: PHYSICIAN ASSISTANT

## 2024-09-27 PROCEDURE — 2500000001 HC RX 250 WO HCPCS SELF ADMINISTERED DRUGS (ALT 637 FOR MEDICARE OP)

## 2024-09-27 PROCEDURE — 1210000001 HC SEMI-PRIVATE ROOM DAILY

## 2024-09-27 PROCEDURE — 83735 ASSAY OF MAGNESIUM: CPT | Performed by: PHYSICIAN ASSISTANT

## 2024-09-27 PROCEDURE — 2500000002 HC RX 250 W HCPCS SELF ADMINISTERED DRUGS (ALT 637 FOR MEDICARE OP, ALT 636 FOR OP/ED)

## 2024-09-27 PROCEDURE — 99233 SBSQ HOSP IP/OBS HIGH 50: CPT | Performed by: PHYSICIAN ASSISTANT

## 2024-09-27 PROCEDURE — 85027 COMPLETE CBC AUTOMATED: CPT | Performed by: PHYSICIAN ASSISTANT

## 2024-09-27 PROCEDURE — 36415 COLL VENOUS BLD VENIPUNCTURE: CPT | Performed by: PHYSICIAN ASSISTANT

## 2024-09-27 RX ORDER — CIPROFLOXACIN 500 MG/1
500 TABLET ORAL EVERY 12 HOURS SCHEDULED
Status: DISCONTINUED | OUTPATIENT
Start: 2024-09-27 | End: 2024-09-27

## 2024-09-27 RX ORDER — METRONIDAZOLE 500 MG/1
500 TABLET ORAL EVERY 8 HOURS SCHEDULED
Status: DISCONTINUED | OUTPATIENT
Start: 2024-09-27 | End: 2024-09-28 | Stop reason: HOSPADM

## 2024-09-27 RX ORDER — CIPROFLOXACIN 500 MG/1
500 TABLET ORAL EVERY 24 HOURS
Status: DISCONTINUED | OUTPATIENT
Start: 2024-09-28 | End: 2024-09-28 | Stop reason: HOSPADM

## 2024-09-27 RX ADMIN — METOPROLOL SUCCINATE 25 MG: 25 TABLET, EXTENDED RELEASE ORAL at 21:07

## 2024-09-27 RX ADMIN — PIPERACILLIN SODIUM AND TAZOBACTAM SODIUM 3.38 G: 3; .375 INJECTION, SOLUTION INTRAVENOUS at 05:05

## 2024-09-27 RX ADMIN — ROSUVASTATIN 10 MG: 10 TABLET, FILM COATED ORAL at 21:07

## 2024-09-27 RX ADMIN — CIPROFLOXACIN 500 MG: 500 TABLET ORAL at 08:38

## 2024-09-27 RX ADMIN — METRONIDAZOLE 500 MG: 500 TABLET ORAL at 08:38

## 2024-09-27 RX ADMIN — METRONIDAZOLE 500 MG: 500 TABLET ORAL at 21:07

## 2024-09-27 RX ADMIN — METRONIDAZOLE 500 MG: 500 TABLET ORAL at 14:12

## 2024-09-27 ASSESSMENT — ENCOUNTER SYMPTOMS
SORE THROAT: 0
ABDOMINAL DISTENTION: 0
PALPITATIONS: 0
HALLUCINATIONS: 0
SHORTNESS OF BREATH: 0
COUGH: 0
EYE PAIN: 0
VOMITING: 0
HEADACHES: 0
FACIAL SWELLING: 0
DIAPHORESIS: 0
APPETITE CHANGE: 0
WOUND: 0
HEMATURIA: 0
TROUBLE SWALLOWING: 0
CONSTIPATION: 0
CHILLS: 0
DIARRHEA: 0
NAUSEA: 0
CHEST TIGHTNESS: 0
ABDOMINAL PAIN: 0
DYSURIA: 0
FLANK PAIN: 0
ABDOMINAL PAIN: 1
LIGHT-HEADEDNESS: 0
NUMBNESS: 0
WEAKNESS: 0
BACK PAIN: 0
FREQUENCY: 0
WHEEZING: 0
JOINT SWELLING: 0
BRUISES/BLEEDS EASILY: 0
BLOOD IN STOOL: 0
FATIGUE: 0
FEVER: 0
DIZZINESS: 0

## 2024-09-27 ASSESSMENT — COGNITIVE AND FUNCTIONAL STATUS - GENERAL
MOBILITY SCORE: 24
DAILY ACTIVITIY SCORE: 24

## 2024-09-27 ASSESSMENT — PAIN SCALES - GENERAL
PAINLEVEL_OUTOF10: 0 - NO PAIN
PAINLEVEL_OUTOF10: 0 - NO PAIN

## 2024-09-27 ASSESSMENT — PAIN - FUNCTIONAL ASSESSMENT: PAIN_FUNCTIONAL_ASSESSMENT: 0-10

## 2024-09-27 NOTE — PROGRESS NOTES
Gilberto White is a 64 y.o. male on day 5 of admission presenting with Acute appendicitis with appendiceal abscess.      Subjective   .Interval History    Patient is doing  well  Sitting in chair    ROS  Denies chest pain, shortness of breath,  Nausea, vomiting , diarrhea, fever or chills.     No change in Past Medical History        Objective          Vitals 24HR  Heart Rate:  [69-76]   Temp:  [36.3 °C (97.4 °F)-37 °C (98.6 °F)]   Resp:  [16-17]   BP: (109-145)/(64-87)   SpO2:  [97 %-100 %]     .  Vitals:    09/27/24 0051 09/27/24 0555 09/27/24 0934 09/27/24 1343   BP: 127/65 126/87 109/66 145/67   BP Location: Left arm Left arm Left arm Left arm   Patient Position: Sitting Lying Lying Sitting   Pulse: 70 69 71 76   Resp: 17 17 16 16   Temp: 36.4 °C (97.6 °F) 37 °C (98.6 °F) 36.3 °C (97.4 °F) 36.8 °C (98.2 °F)   TempSrc: Temporal Temporal Temporal Temporal   SpO2: 99% 97% 99% 99%   Weight:       Height:            Intake/Output last 3 Shifts:    Intake/Output Summary (Last 24 hours) at 9/27/2024 1348  Last data filed at 9/27/2024 0555  Gross per 24 hour   Intake 1070 ml   Output 0 ml   Net 1070 ml     .  Results from last 7 days   Lab Units 09/27/24  0514 09/26/24 0459 09/25/24  0413   SODIUM mmol/L 138 137 139   POTASSIUM mmol/L 3.6 4.0 4.0   CHLORIDE mmol/L 107 104 107   CO2 mmol/L 23 21 24   BUN mg/dL 67* 68* 66*   CREATININE mg/dL 2.69* 2.51* 2.37*   EGFR mL/min/1.73m*2 26* 28* 30*   GLUCOSE mg/dL 109* 119* 129*   CALCIUM mg/dL 7.4* 7.5* 8.0*    .  Results from last 7 days   Lab Units 09/27/24  0514 09/26/24  0459 09/25/24  0413   WBC AUTO x10*3/uL 9.7  9.7 11.9* 12.9*   HEMOGLOBIN g/dL 9.2*  9.2* 9.0* 9.4*   HEMATOCRIT % 27.1*  27.1* 27.3* 28.1*   PLATELETS AUTO x10*3/uL 260  260 271 240      Physical Exam  Constitutional:       Appearance: Normal appearance.   HENT:      Mouth/Throat:      Mouth: Mucous membranes are moist.      Pharynx: Oropharynx is clear.   Eyes:      Extraocular Movements:  Extraocular movements intact.      Pupils: Pupils are equal, round, and reactive to light.   Cardiovascular:      Rate and Rhythm: Normal rate and regular rhythm.      Pulses: Normal pulses.      Heart sounds: Normal heart sounds.   Pulmonary:      Effort: Pulmonary effort is normal.      Breath sounds: Normal breath sounds.   Abdominal:      General: Abdomen is flat. Bowel sounds are normal.      Palpations: Abdomen is soft.   Musculoskeletal:      Right lower leg: Edema present.      Left lower leg: Edema present.   Skin:     General: Skin is warm and dry.      Capillary Refill: Capillary refill takes 2 to 3 seconds.   Neurological:      Mental Status: He is alert and oriented to person, place, and time. Mental status is at baseline.   Psychiatric:         Mood and Affect: Mood normal.         Behavior: Behavior normal.           Assessment/Plan      MARICARMEN/ATN 2/2 Acute perforated appendicitis with abscess s/p appendectomy  (N17.0)  Azotemia (R79.89)  Metabolic acidosis E87.20  CKD3a  N18.31  Diabetes type 2   HTN  Hypocalcemia   Anemia in the setting CKD3a  HLD  Leukocytosis with acute appendicitis and appendiceal abscess K35.33    Recommendations   Scr slightly up , Acute kidney injury consistent with acute tubular necrosis baseline creatinine 1.5-1.8   We will reorder urine microscopy  Hypertension with hypertensive chronic kidney disease   + edema off norvas now  Agree with holding SGLT2 inhibitor therapy as well at this time.   Your rx for appendicitis   Acidosis improved    .Thank you for the consult and the opportunity to participate inn the care of this patient. Please do not hesitate to contact us with any questions or concern  SASHA Ortega, CNP  [facility]  [phone]

## 2024-09-27 NOTE — PROGRESS NOTES
"GENERAL SURGERY PROGRESS NOTE    Gilberto White   1960   [id number]     Gilberto White is a 64 y.o. male on day 5 of admission presenting with Acute appendicitis with appendiceal abscess.      Subjective  Pt EDMUND at bedside.  NAEON.  Overall pain in RUQ is minimal. MIKI drain with 15 ml of serosanguinus output with some purulence output.  Had 2 BM yesterday.  Tolerating diet with no nausea or vomiting.     Review of Systems   Constitutional:  Negative for chills, fatigue and fever.   Respiratory:  Negative for cough, chest tightness and shortness of breath.    Cardiovascular:  Negative for chest pain and palpitations.   Gastrointestinal:  Positive for abdominal pain. Negative for abdominal distention, constipation, diarrhea, nausea and vomiting.   Neurological:  Negative for dizziness, light-headedness and headaches.       Objective    Last Recorded Vitals  Blood pressure 126/87, pulse 69, temperature 37 °C (98.6 °F), temperature source Temporal, resp. rate 17, height 1.727 m (5' 8\"), weight 80.7 kg (178 lb), SpO2 97%.    Intake/Output last 3 Shifts:  I/O last 3 completed shifts:  In: 1790 (22.2 mL/kg) [P.O.:1790]  Out: 46 (0.6 mL/kg) [Urine:1 (0 mL/kg/hr); Drains:45]  Weight: 80.7 kg     Gen: NAD.  A&Ox3  HEENT: NC/AT.  Moist mucous membranes.  Neck: Normal range of motion.  CV: Regular rate.  Chest: Normal chest rise.  Normal respiratory effort.  Abdomen: Soft.  No abdominal distention.  Minimal tenderness to palpation in RUQ and along incision.  MIKI drain with minimal  output  Extremities: No edema.  Moving all extremities.    Relevant Results  Results for orders placed or performed during the hospital encounter of 09/22/24 (from the past 24 hour(s))   POCT GLUCOSE   Result Value Ref Range    POCT Glucose 201 (H) 74 - 99 mg/dL   POCT GLUCOSE   Result Value Ref Range    POCT Glucose 125 (H) 74 - 99 mg/dL   POCT GLUCOSE   Result Value Ref Range    POCT Glucose 128 (H) 74 - 99 mg/dL   CBC   Result Value Ref Range "    WBC 9.7 4.4 - 11.3 x10*3/uL    nRBC 0.0 0.0 - 0.0 /100 WBCs    RBC 2.89 (L) 4.50 - 5.90 x10*6/uL    Hemoglobin 9.2 (L) 13.5 - 17.5 g/dL    Hematocrit 27.1 (L) 41.0 - 52.0 %    MCV 94 80 - 100 fL    MCH 31.8 26.0 - 34.0 pg    MCHC 33.9 32.0 - 36.0 g/dL    RDW 11.7 11.5 - 14.5 %    Platelets 260 150 - 450 x10*3/uL   Basic Metabolic Panel   Result Value Ref Range    Glucose 109 (H) 74 - 99 mg/dL    Sodium 138 136 - 145 mmol/L    Potassium 3.6 3.5 - 5.3 mmol/L    Chloride 107 98 - 107 mmol/L    Bicarbonate 23 21 - 32 mmol/L    Anion Gap 12 10 - 20 mmol/L    Urea Nitrogen 67 (H) 6 - 23 mg/dL    Creatinine 2.69 (H) 0.50 - 1.30 mg/dL    eGFR 26 (L) >60 mL/min/1.73m*2    Calcium 7.4 (L) 8.6 - 10.3 mg/dL   Magnesium   Result Value Ref Range    Magnesium 3.29 (H) 1.60 - 2.40 mg/dL   POCT GLUCOSE   Result Value Ref Range    POCT Glucose 110 (H) 74 - 99 mg/dL       US renal complete    Result Date: 9/24/2024  Interpreted By:  Reanna Godinez, STUDY: US RENAL COMPLETE; 9/24/2024 11:22 am   INDICATION: Acute renal insufficiency   COMPARISON: 09/05/2020   ACCESSION NUMBER(S): [id number]   ORDERING CLINICIAN: TOSHIA FRANCOIS   TECHNIQUE: Grayscale and color Doppler imaging of the kidneys.   FINDINGS: The right kidney measures 9.7 cm in length. The left kidney measures 10.6 cm in length. There is no shadowing calculus, hydronephrosis, or solid mass identified. Known renal calculi seen on CT study of 09/22/2024 are difficult to visualize on this examination. The renal cortical thickness and echogenicity is normal. A small amount of perinephric fluid is seen bilaterally.   Urinary bladder is unremarkable in appearance.       Small amount of perinephric fluid bilaterally.   No hydronephrosis or renal atrophy.   Known renal calculi seen on CT study done 2 days are difficult to visualize on this examination   MACRO: None.   Signed by: Reanna Godinez 9/24/2024 12:45 PM Dictation workstation:   [id number]    Electrocardiogram, 12-lead PRN  ACS symptoms    Result Date: 9/23/2024  Sinus rhythm Confirmed by Mariel Whalen ([id number]) on 9/23/2024 5:50:21 PM    CT abdomen pelvis wo IV contrast    Result Date: 9/22/2024  Interpreted By:  Anna Stephens, STUDY: CT ABDOMEN PELVIS WO IV CONTRAST;  9/22/2024 3:08 pm   INDICATION: Signs/Symptoms:right flank and rlq pain, tender, hx kidney stones.   COMPARISON: 09/05/2023   ACCESSION NUMBER(S): [id number]   ORDERING CLINICIAN: ANGELINA MCFADDEN   TECHNIQUE: CT of the abdomen and pelvis was performed. Contiguous axial images were obtained at 3 mm slice thickness through the abdomen and pelvis. Coronal and sagittal reconstructions at 3 mm slice thickness were performed.  No intravenous or oral contrast agents were administered.   FINDINGS: Please note that the evaluation of vessels, lymph nodes and organs is limited without intravenous contrast.   LOWER CHEST: There are bands of atelectasis in the lung bases and right middle lobe. No pleural effusion. Gynecomastia noted.   ABDOMEN:   LIVER: Normal size, contour, and density. No focal lesion evident.   GALLBLADDER: Normal-size. Hazy increased density in the dependent portion of the gallbladder, likely sludge. No definite radiopaque gallstone.   BILE DUCTS: Normal caliber.   PANCREAS: The pancreas appears within normal limits, no evidence of pancreatitis or mass.   SPLEEN: Normal size.   ADRENAL GLANDS: Within normal limits.   KIDNEYS AND URETERS: Bilateral perinephric edema. No hydronephrosis. Bilateral nonobstructing calculi are present. The largest is in the right renal pelvis, triangular shaped, 1.3 cm. No ureteral stone. No focal lesion evident.   PELVIS:   BLADDER: Within normal limits.   REPRODUCTIVE ORGANS: Mild prostate enlargement.   BOWEL: The stomach is unremarkable. The small bowel is normal in caliber without evidence of focal wall thickening or obstruction. There is no evidence of focal wall thickening or dilatation of the large bowel. Markedly abnormal  appendix is diffusely thickened and dilated, 1.8 cm diameter, with significant surrounding fat stranding, extends along right paracolic gutter up to near the gallbladder fossa. There is calcific density column of material in the proximal appendix which may represent 1 or more appendicoliths although difficult to measure. Significant fat stranding present around the appendix, there is no definite fluid collection or abscess. No evidence of perforation.   VESSELS: Normal caliber aorta. Atherosclerosis present.   PERITONEUM/RETROPERITONEUM/LYMPH NODES: Retroperitoneal edema bilateral perinephric space and along the paracolic gutters. Edema surrounds the dilated appendix, however no significant free fluid. No pneumoperitoneum. No adenopathy.   BONES AND ABDOMINAL WALL: No evidence of acute fracture. No suspicious osseous lesions are identified.Mild degenerative changes in the spine and hips. Small fat containing left inguinal hernia.       1.  Acute appendicitis. There is a markedly dilated (1.8 cm) and thick-walled appendix containing some radiopaque material proximally, suspect there is probably an appendicolith within although difficult to measure. Periappendiceal fat stranding is present, however no definite abscess or evidence of perforation. 2. Bilateral nonobstructing nephrolithiasis. Perinephric edema is symmetric and nonspecific. Correlate for any clinical evidence of UTI. 3. Ancillary findings as above.     Signed by: Anna Stephens 9/22/2024 4:09 PM Dictation workstation:   [id number]      Assessment and Plan  Principal Problem:    Acute appendicitis with appendiceal abscess    64 y.o. male with acute appendicitis and MARICARMEN.     Plan:   - Continue regular diet  - PRN pain and nausea medications.   - DC rio drain  - Nephro following for elevated cre, likely 2/2 ATN, continue hydration  - encourage OOB and ambulation  - transition to PO Cipro and flagyl, total abx course 1 week from OR till 9/29    Discussed with   Ryan.     Osito Huitron, DO Osito Huitron DO, PGY-3

## 2024-09-27 NOTE — CARE PLAN
The patient's goals for the shift include      The clinical goals for the shift include pain control      Problem: Pain - Adult  Goal: Verbalizes/displays adequate comfort level or baseline comfort level  Outcome: Progressing     Problem: Safety - Adult  Goal: Free from fall injury  Outcome: Progressing     Problem: Discharge Planning  Goal: Discharge to home or other facility with appropriate resources  Outcome: Progressing     Problem: Chronic Conditions and Co-morbidities  Goal: Patient's chronic conditions and co-morbidity symptoms are monitored and maintained or improved  Outcome: Progressing     Problem: Diabetes  Goal: Achieve decreasing blood glucose levels by end of shift  Outcome: Progressing  Goal: Increase stability of blood glucose readings by end of shift  Outcome: Progressing  Goal: Decrease in ketones present in urine by end of shift  Outcome: Progressing  Goal: Maintain electrolyte levels within acceptable range throughout shift  Outcome: Progressing  Goal: Maintain glucose levels >70mg/dl to <250mg/dl throughout shift  Outcome: Progressing  Goal: No changes in neurological exam by end of shift  Outcome: Progressing  Goal: Learn about and adhere to nutrition recommendations by end of shift  Outcome: Progressing  Goal: Vital signs within normal range for age by end of shift  Outcome: Progressing  Goal: Increase self care and/or family involovement by end of shift  Outcome: Progressing  Goal: Receive DSME education by end of shift  Outcome: Progressing

## 2024-09-27 NOTE — PROGRESS NOTES
Patient prefers to return home with no needs upon discharge. TCC will continue to follow for needs if they arise.

## 2024-09-27 NOTE — CARE PLAN
Problem: Pain - Adult  Goal: Verbalizes/displays adequate comfort level or baseline comfort level  Outcome: Progressing     Problem: Safety - Adult  Goal: Free from fall injury  Outcome: Progressing     Problem: Discharge Planning  Goal: Discharge to home or other facility with appropriate resources  Outcome: Progressing     Problem: Chronic Conditions and Co-morbidities  Goal: Patient's chronic conditions and co-morbidity symptoms are monitored and maintained or improved  Outcome: Progressing     Problem: Diabetes  Goal: Achieve decreasing blood glucose levels by end of shift  Outcome: Progressing  Goal: Increase stability of blood glucose readings by end of shift  Outcome: Progressing  Goal: Decrease in ketones present in urine by end of shift  Outcome: Progressing  Goal: Maintain electrolyte levels within acceptable range throughout shift  Outcome: Progressing  Goal: Maintain glucose levels >70mg/dl to <250mg/dl throughout shift  Outcome: Progressing  Goal: No changes in neurological exam by end of shift  Outcome: Progressing  Goal: Learn about and adhere to nutrition recommendations by end of shift  Outcome: Progressing  Goal: Vital signs within normal range for age by end of shift  Outcome: Progressing  Goal: Increase self care and/or family involovement by end of shift  Outcome: Progressing  Goal: Receive DSME education by end of shift  Outcome: Progressing     Problem: Pain  Goal: Takes deep breaths with improved pain control throughout the shift  Outcome: Progressing  Goal: Turns in bed with improved pain control throughout the shift  Outcome: Progressing  Goal: Walks with improved pain control throughout the shift  Outcome: Progressing  Goal: Performs ADL's with improved pain control throughout shift  Outcome: Progressing  Goal: Participates in PT with improved pain control throughout the shift  Outcome: Progressing  Goal: Free from opioid side effects throughout the shift  Outcome: Progressing  Goal:  Free from acute confusion related to pain meds throughout the shift  Outcome: Progressing         The patient's goals for the shift include      The clinical goals for the shift include Patient safety will be maintained throughout the shift

## 2024-09-27 NOTE — PROGRESS NOTES
Gilberto White is a 64 y.o. male on day 5 of admission presenting with Acute appendicitis with appendiceal abscess.      Subjective   Gilberto White is a 63 y.o. male with past medical history s/f HTN, HLD, bilateral renal artery stenosis, T2DM, CKD stage IIIa who presented 9/22/24 with RUQ/R flank pain x1 week, found to have acute appendicitis (CT abdomen pelvis shows markedly dilated and thick-walled appendix containing radiopaque material approximately suspect appendicolith with associated periappendiceal fat stranding with no clear abscess or perforation) status post appendectomy laparoscopy on 9/22/2024. Medicine was consulted for medical management. Patient noted to have MARICARMEN/ATN on CKD. Renal US without hydronephrosis. Treated with conservative management/IVF    9/27/2024: No acute events overnight. Vitals stable, afebrile. Cr slightly rising 2.69 today (baseline ~1.5). No metabolic acidosis. Leukocytosis resolved today. Hgb stable 9.2  Discussed renal dysfunction with nephrology, recommend monitoring in house until ATN shows peak, then likely can dc and monitor outpatient   Op cx- abundant e. Coli, pansensitive- changed to cipro/flagyl by primary service, will adjust cipro dose for renal function (total of 7 days)  Bl cx x2- ng x3 days  Had 2 Bms yesterday  Having some BLE edema today, he attributes this to taking amlodipine without chlorthalidone which he repiorts had happened to him before and why the chlorthalidone was started. Will stop amlodipine, monitor BPs     Hospitalist group will take over care from here as he is surgically ready to discharge from the hospital. DC planning once renal function improves          Review of Systems   Constitutional:  Negative for appetite change, chills, diaphoresis, fatigue and fever.   HENT:  Negative for congestion, ear pain, facial swelling, hearing loss, nosebleeds, sore throat, tinnitus and trouble swallowing.    Eyes:  Negative for pain.   Respiratory:   Negative for cough, chest tightness, shortness of breath and wheezing.    Cardiovascular:  Positive for leg swelling. Negative for chest pain and palpitations.   Gastrointestinal:  Negative for abdominal pain, blood in stool, constipation, diarrhea, nausea and vomiting.   Genitourinary:  Negative for dysuria, flank pain, frequency, hematuria and urgency.   Musculoskeletal:  Negative for back pain and joint swelling.   Skin:  Negative for rash and wound.   Neurological:  Negative for dizziness, syncope, weakness, light-headedness, numbness and headaches.   Hematological:  Does not bruise/bleed easily.   Psychiatric/Behavioral:  Negative for behavioral problems, hallucinations and suicidal ideas.           Objective     Last Recorded Vitals  /87 (BP Location: Left arm, Patient Position: Lying)   Pulse 69   Temp 37 °C (98.6 °F) (Temporal)   Resp 17   Wt 80.7 kg (178 lb)   SpO2 97%     Image Results  Electrocardiogram, 12-lead PRN ACS symptoms    Result Date: 9/23/2024  Sinus rhythm    CT abdomen pelvis wo IV contrast    Result Date: 9/22/2024  Interpreted By:  Anna Stephens, STUDY: CT ABDOMEN PELVIS WO IV CONTRAST;  9/22/2024 3:08 pm   INDICATION: Signs/Symptoms:right flank and rlq pain, tender, hx kidney stones.   COMPARISON: 09/05/2023   ACCESSION NUMBER(S): [id number]   ORDERING CLINICIAN: ANGELINA MCFADDEN   TECHNIQUE: CT of the abdomen and pelvis was performed. Contiguous axial images were obtained at 3 mm slice thickness through the abdomen and pelvis. Coronal and sagittal reconstructions at 3 mm slice thickness were performed.  No intravenous or oral contrast agents were administered.   FINDINGS: Please note that the evaluation of vessels, lymph nodes and organs is limited without intravenous contrast.   LOWER CHEST: There are bands of atelectasis in the lung bases and right middle lobe. No pleural effusion. Gynecomastia noted.   ABDOMEN:   LIVER: Normal size, contour, and density. No focal lesion evident.    GALLBLADDER: Normal-size. Hazy increased density in the dependent portion of the gallbladder, likely sludge. No definite radiopaque gallstone.   BILE DUCTS: Normal caliber.   PANCREAS: The pancreas appears within normal limits, no evidence of pancreatitis or mass.   SPLEEN: Normal size.   ADRENAL GLANDS: Within normal limits.   KIDNEYS AND URETERS: Bilateral perinephric edema. No hydronephrosis. Bilateral nonobstructing calculi are present. The largest is in the right renal pelvis, triangular shaped, 1.3 cm. No ureteral stone. No focal lesion evident.   PELVIS:   BLADDER: Within normal limits.   REPRODUCTIVE ORGANS: Mild prostate enlargement.   BOWEL: The stomach is unremarkable. The small bowel is normal in caliber without evidence of focal wall thickening or obstruction. There is no evidence of focal wall thickening or dilatation of the large bowel. Markedly abnormal appendix is diffusely thickened and dilated, 1.8 cm diameter, with significant surrounding fat stranding, extends along right paracolic gutter up to near the gallbladder fossa. There is calcific density column of material in the proximal appendix which may represent 1 or more appendicoliths although difficult to measure. Significant fat stranding present around the appendix, there is no definite fluid collection or abscess. No evidence of perforation.   VESSELS: Normal caliber aorta. Atherosclerosis present.   PERITONEUM/RETROPERITONEUM/LYMPH NODES: Retroperitoneal edema bilateral perinephric space and along the paracolic gutters. Edema surrounds the dilated appendix, however no significant free fluid. No pneumoperitoneum. No adenopathy.   BONES AND ABDOMINAL WALL: No evidence of acute fracture. No suspicious osseous lesions are identified.Mild degenerative changes in the spine and hips. Small fat containing left inguinal hernia.       1.  Acute appendicitis. There is a markedly dilated (1.8 cm) and thick-walled appendix containing some radiopaque  material proximally, suspect there is probably an appendicolith within although difficult to measure. Periappendiceal fat stranding is present, however no definite abscess or evidence of perforation. 2. Bilateral nonobstructing nephrolithiasis. Perinephric edema is symmetric and nonspecific. Correlate for any clinical evidence of UTI. 3. Ancillary findings as above.     Signed by: Anna Stephens 9/22/2024 4:09 PM Dictation workstation:   [id number]       Lab Results  Results for orders placed or performed during the hospital encounter of 09/22/24 (from the past 24 hour(s))   POCT GLUCOSE   Result Value Ref Range    POCT Glucose 201 (H) 74 - 99 mg/dL   POCT GLUCOSE   Result Value Ref Range    POCT Glucose 125 (H) 74 - 99 mg/dL   POCT GLUCOSE   Result Value Ref Range    POCT Glucose 128 (H) 74 - 99 mg/dL   CBC   Result Value Ref Range    WBC 9.7 4.4 - 11.3 x10*3/uL    nRBC 0.0 0.0 - 0.0 /100 WBCs    RBC 2.89 (L) 4.50 - 5.90 x10*6/uL    Hemoglobin 9.2 (L) 13.5 - 17.5 g/dL    Hematocrit 27.1 (L) 41.0 - 52.0 %    MCV 94 80 - 100 fL    MCH 31.8 26.0 - 34.0 pg    MCHC 33.9 32.0 - 36.0 g/dL    RDW 11.7 11.5 - 14.5 %    Platelets 260 150 - 450 x10*3/uL   Basic Metabolic Panel   Result Value Ref Range    Glucose 109 (H) 74 - 99 mg/dL    Sodium 138 136 - 145 mmol/L    Potassium 3.6 3.5 - 5.3 mmol/L    Chloride 107 98 - 107 mmol/L    Bicarbonate 23 21 - 32 mmol/L    Anion Gap 12 10 - 20 mmol/L    Urea Nitrogen 67 (H) 6 - 23 mg/dL    Creatinine 2.69 (H) 0.50 - 1.30 mg/dL    eGFR 26 (L) >60 mL/min/1.73m*2    Calcium 7.4 (L) 8.6 - 10.3 mg/dL   Magnesium   Result Value Ref Range    Magnesium 3.29 (H) 1.60 - 2.40 mg/dL   POCT GLUCOSE   Result Value Ref Range    POCT Glucose 110 (H) 74 - 99 mg/dL        Medications  Scheduled medications:  [Held by provider] chlorthalidone, 25 mg, oral, Daily  ciprofloxacin, 500 mg, oral, q12h ADELITA  [Held by provider] empagliflozin, 10 mg, oral, Daily  [Held by provider] losartan, 25 mg, oral,  Daily  metoprolol succinate XL, 25 mg, oral, Daily  metroNIDAZOLE, 500 mg, oral, q8h ADELITA  rosuvastatin, 10 mg, oral, Nightly      Continuous medications:       PRN medications:  PRN medications: acetaminophen, morphine, morphine, ondansetron, promethazine     Physical Exam  Constitutional:       General: He is not in acute distress.     Appearance: Normal appearance.      Comments: Ambulating independently in the room upon my entrance   HENT:      Head: Normocephalic and atraumatic.      Right Ear: External ear normal.      Left Ear: External ear normal.      Nose: Nose normal.      Mouth/Throat:      Mouth: Mucous membranes are moist.      Pharynx: Oropharynx is clear.   Eyes:      Extraocular Movements: Extraocular movements intact.      Conjunctiva/sclera: Conjunctivae normal.      Pupils: Pupils are equal, round, and reactive to light.   Cardiovascular:      Rate and Rhythm: Normal rate and regular rhythm.      Pulses: Normal pulses.      Heart sounds: Normal heart sounds.   Pulmonary:      Effort: Pulmonary effort is normal. No respiratory distress.      Breath sounds: Normal breath sounds. No wheezing, rhonchi or rales.   Abdominal:      General: Bowel sounds are normal.      Palpations: Abdomen is soft.      Tenderness: There is abdominal tenderness. There is no right CVA tenderness, left CVA tenderness, guarding or rebound.      Comments: LLQ drain removed   Musculoskeletal:         General: No swelling. Normal range of motion.      Cervical back: Normal range of motion and neck supple.      Right lower leg: Edema present.      Left lower leg: Edema present.   Skin:     General: Skin is warm and dry.      Capillary Refill: Capillary refill takes less than 2 seconds.      Findings: No lesion or rash.   Neurological:      General: No focal deficit present.      Mental Status: He is alert and oriented to person, place, and time. Mental status is at baseline.   Psychiatric:         Mood and Affect: Mood normal.          Behavior: Behavior normal.                  Assessment/Plan      Acute perforated appendicitis with abscesss/p laparoscopic converted to open appendectomy 9/22/2024 with sepsis   -Managed by primary care service team  -Cipro and flagyl- renally dose- can complete course 9/28/24  -Diet per general surgery, IVF hydration until able to maintain own hydration with PO intake   -Pain meds, antiemetics as needed  -Follow culture- e. Coli, pan-sensitive   -Sepsis POA. Endorgan dysfunction: MARICARMEN     MARICARMEN/ATN on CKD stage IIIa  -BUN/creatinine 89/2.27 on admission, baseline creatinine 1.50-1.60  -Hold losartan, chlorthalidone  -Continue  mL/h  -Continue to trend while admitted  -Nephrology consult  -Renal US without hydronephrosis      HTN  -Hold losartan, chlorthalidone given his MARICARMEN  -Hold amlodipine given BLE edema (has happened to him previously without diuretics)  -Continue home metoprolol     HLD  -continue on statin     DM-II   -A1C of 6.0 on 8/20/24  -continue home jardiance     Anemia  -Hgb 10.2, stable and at near baseline  -Transfuse RBC for hemoglobin less than 7 or greater than 7 with signs of bleeding and hemodynamic instability     Code Status: Full Code     DVT ppx: Per primary      Please see orders for more complete plan    Thank you for involving us in the care of this very pleasant patient. We will follow along with you while they remain admitted. Please contact Hospitalist service if any clarification needed.     Zenaida Dillon PA-C

## 2024-09-28 VITALS
HEIGHT: 68 IN | RESPIRATION RATE: 16 BRPM | WEIGHT: 178 LBS | SYSTOLIC BLOOD PRESSURE: 132 MMHG | OXYGEN SATURATION: 99 % | HEART RATE: 72 BPM | TEMPERATURE: 97.9 F | DIASTOLIC BLOOD PRESSURE: 69 MMHG | BODY MASS INDEX: 26.98 KG/M2

## 2024-09-28 LAB
ANION GAP SERPL CALC-SCNC: 13 MMOL/L (ref 10–20)
BACTERIA BLD CULT: NORMAL
BACTERIA BLD CULT: NORMAL
BUN SERPL-MCNC: 62 MG/DL (ref 6–23)
CALCIUM SERPL-MCNC: 7.5 MG/DL (ref 8.6–10.3)
CHLORIDE SERPL-SCNC: 106 MMOL/L (ref 98–107)
CO2 SERPL-SCNC: 23 MMOL/L (ref 21–32)
CREAT SERPL-MCNC: 2.37 MG/DL (ref 0.5–1.3)
EGFRCR SERPLBLD CKD-EPI 2021: 30 ML/MIN/1.73M*2
ERYTHROCYTE [DISTWIDTH] IN BLOOD BY AUTOMATED COUNT: 11.6 % (ref 11.5–14.5)
GLUCOSE BLD MANUAL STRIP-MCNC: 128 MG/DL (ref 74–99)
GLUCOSE SERPL-MCNC: 124 MG/DL (ref 74–99)
HCT VFR BLD AUTO: 26 % (ref 41–52)
HGB BLD-MCNC: 8.9 G/DL (ref 13.5–17.5)
MAGNESIUM SERPL-MCNC: 3.23 MG/DL (ref 1.6–2.4)
MCH RBC QN AUTO: 31.6 PG (ref 26–34)
MCHC RBC AUTO-ENTMCNC: 34.2 G/DL (ref 32–36)
MCV RBC AUTO: 92 FL (ref 80–100)
NRBC BLD-RTO: 0 /100 WBCS (ref 0–0)
PLATELET # BLD AUTO: 282 X10*3/UL (ref 150–450)
POTASSIUM SERPL-SCNC: 3.3 MMOL/L (ref 3.5–5.3)
RBC # BLD AUTO: 2.82 X10*6/UL (ref 4.5–5.9)
SODIUM SERPL-SCNC: 139 MMOL/L (ref 136–145)
WBC # BLD AUTO: 11 X10*3/UL (ref 4.4–11.3)

## 2024-09-28 PROCEDURE — 85027 COMPLETE CBC AUTOMATED: CPT | Performed by: PHYSICIAN ASSISTANT

## 2024-09-28 PROCEDURE — 36415 COLL VENOUS BLD VENIPUNCTURE: CPT | Performed by: PHYSICIAN ASSISTANT

## 2024-09-28 PROCEDURE — 2500000001 HC RX 250 WO HCPCS SELF ADMINISTERED DRUGS (ALT 637 FOR MEDICARE OP)

## 2024-09-28 PROCEDURE — 99239 HOSP IP/OBS DSCHRG MGMT >30: CPT | Performed by: INTERNAL MEDICINE

## 2024-09-28 PROCEDURE — 83735 ASSAY OF MAGNESIUM: CPT | Performed by: PHYSICIAN ASSISTANT

## 2024-09-28 PROCEDURE — 80048 BASIC METABOLIC PNL TOTAL CA: CPT | Performed by: PHYSICIAN ASSISTANT

## 2024-09-28 PROCEDURE — 2500000001 HC RX 250 WO HCPCS SELF ADMINISTERED DRUGS (ALT 637 FOR MEDICARE OP): Performed by: INTERNAL MEDICINE

## 2024-09-28 PROCEDURE — 82947 ASSAY GLUCOSE BLOOD QUANT: CPT

## 2024-09-28 RX ORDER — CIPROFLOXACIN 500 MG/1
500 TABLET ORAL EVERY 24 HOURS
Qty: 2 TABLET | Refills: 0 | Status: SHIPPED | OUTPATIENT
Start: 2024-09-29 | End: 2024-09-28

## 2024-09-28 RX ORDER — METRONIDAZOLE 500 MG/1
500 TABLET ORAL EVERY 8 HOURS SCHEDULED
Qty: 6 TABLET | Refills: 0 | Status: SHIPPED | OUTPATIENT
Start: 2024-09-28 | End: 2024-09-28

## 2024-09-28 RX ORDER — ACETAMINOPHEN 325 MG/1
650 TABLET ORAL EVERY 4 HOURS PRN
Qty: 30 TABLET | Refills: 0 | Status: SHIPPED | OUTPATIENT
Start: 2024-09-28

## 2024-09-28 RX ORDER — CIPROFLOXACIN 500 MG/1
500 TABLET ORAL EVERY 24 HOURS
Qty: 4 TABLET | Refills: 0 | Status: SHIPPED | OUTPATIENT
Start: 2024-09-29

## 2024-09-28 RX ORDER — METRONIDAZOLE 500 MG/1
500 TABLET ORAL EVERY 8 HOURS SCHEDULED
Qty: 6 TABLET | Refills: 0 | Status: SHIPPED | OUTPATIENT
Start: 2024-09-28

## 2024-09-28 RX ORDER — POTASSIUM CHLORIDE 1.5 G/1.58G
40 POWDER, FOR SOLUTION ORAL ONCE
Status: COMPLETED | OUTPATIENT
Start: 2024-09-28 | End: 2024-09-28

## 2024-09-28 RX ADMIN — METRONIDAZOLE 500 MG: 500 TABLET ORAL at 05:32

## 2024-09-28 RX ADMIN — POTASSIUM CHLORIDE 40 MEQ: 1.5 POWDER, FOR SOLUTION ORAL at 10:19

## 2024-09-28 RX ADMIN — CIPROFLOXACIN 500 MG: 500 TABLET, FILM COATED ORAL at 08:43

## 2024-09-28 RX ADMIN — METRONIDAZOLE 500 MG: 500 TABLET ORAL at 13:50

## 2024-09-28 ASSESSMENT — ENCOUNTER SYMPTOMS
APPETITE CHANGE: 0
SHORTNESS OF BREATH: 0
WHEEZING: 0
VOMITING: 0
CONSTIPATION: 0
CHEST TIGHTNESS: 0
DIARRHEA: 0
DYSURIA: 0
NAUSEA: 0
BACK PAIN: 0
FACIAL SWELLING: 0
BRUISES/BLEEDS EASILY: 0
EYE PAIN: 0
DIAPHORESIS: 0
WEAKNESS: 0
SORE THROAT: 0
TROUBLE SWALLOWING: 0
FATIGUE: 0
FREQUENCY: 0
HALLUCINATIONS: 0
CHILLS: 0
HEMATURIA: 0
DIZZINESS: 0
NUMBNESS: 0
HEADACHES: 0
FLANK PAIN: 0
LIGHT-HEADEDNESS: 0
PALPITATIONS: 0
BLOOD IN STOOL: 0
WOUND: 0
JOINT SWELLING: 0
COUGH: 0
ABDOMINAL PAIN: 0
FEVER: 0

## 2024-09-28 ASSESSMENT — COGNITIVE AND FUNCTIONAL STATUS - GENERAL
DAILY ACTIVITIY SCORE: 24
MOBILITY SCORE: 24

## 2024-09-28 ASSESSMENT — PAIN SCALES - GENERAL
PAINLEVEL_OUTOF10: 0 - NO PAIN
PAINLEVEL_OUTOF10: 0 - NO PAIN

## 2024-09-28 NOTE — PROGRESS NOTES
Gilberto White is a 64 y.o. male on day 6 of admission presenting with Acute appendicitis with appendiceal abscess.      Subjective   Gilberto White is a 63 y.o. male with past medical history s/f HTN, HLD, bilateral renal artery stenosis, T2DM, CKD stage IIIa who presented 9/22/24 with RUQ/R flank pain x1 week, found to have acute appendicitis (CT abdomen pelvis shows markedly dilated and thick-walled appendix containing radiopaque material approximately suspect appendicolith with associated periappendiceal fat stranding with no clear abscess or perforation) status post appendectomy laparoscopy on 9/22/2024. Medicine was consulted for medical management. Patient noted to have MARICARMEN/ATN on CKD. Renal US without hydronephrosis. Treated with conservative management/IVF    9/27/2024: No acute events overnight. Vitals stable, afebrile. Cr slightly rising 2.69 today (baseline ~1.5). No metabolic acidosis. Leukocytosis resolved today. Hgb stable 9.2  Discussed renal dysfunction with nephrology, recommend monitoring in house until ATN shows peak, then likely can dc and monitor outpatient   Op cx- abundant e. Coli, pansensitive- changed to cipro/flagyl by primary service, will adjust cipro dose for renal function (total of 7 days)  Bl cx x2- ng x3 days  Had 2 Bms yesterday  Having some BLE edema today, he attributes this to taking amlodipine without chlorthalidone which he repiorts had happened to him before and why the chlorthalidone was started. Will stop amlodipine, monitor BPs     Hospitalist group will take over care from here as he is surgically ready to discharge from the hospital. DC planning once renal function improves      9/28: nephrology feels patient okay for discharge home as well as surgery will send home on 2 more days of antibiotics.     see after visit summary for complete plan     35 minutes spent in the care of this patient.       Review of Systems   Constitutional:  Negative for appetite change,  chills, diaphoresis, fatigue and fever.   HENT:  Negative for congestion, ear pain, facial swelling, hearing loss, nosebleeds, sore throat, tinnitus and trouble swallowing.    Eyes:  Negative for pain.   Respiratory:  Negative for cough, chest tightness, shortness of breath and wheezing.    Cardiovascular:  Positive for leg swelling. Negative for chest pain and palpitations.   Gastrointestinal:  Negative for abdominal pain, blood in stool, constipation, diarrhea, nausea and vomiting.   Genitourinary:  Negative for dysuria, flank pain, frequency, hematuria and urgency.   Musculoskeletal:  Negative for back pain and joint swelling.   Skin:  Negative for rash and wound.   Neurological:  Negative for dizziness, syncope, weakness, light-headedness, numbness and headaches.   Hematological:  Does not bruise/bleed easily.   Psychiatric/Behavioral:  Negative for behavioral problems, hallucinations and suicidal ideas.           Objective     Last Recorded Vitals  /69 (BP Location: Left arm, Patient Position: Sitting)   Pulse 72   Temp 36.6 °C (97.9 °F) (Temporal)   Resp 16   Wt 80.7 kg (178 lb)   SpO2 99%     Image Results  Electrocardiogram, 12-lead PRN ACS symptoms    Result Date: 9/23/2024  Sinus rhythm    CT abdomen pelvis wo IV contrast    Result Date: 9/22/2024  Interpreted By:  Anna Stephens, STUDY: CT ABDOMEN PELVIS WO IV CONTRAST;  9/22/2024 3:08 pm   INDICATION: Signs/Symptoms:right flank and rlq pain, tender, hx kidney stones.   COMPARISON: 09/05/2023   ACCESSION NUMBER(S): [id number]   ORDERING CLINICIAN: ANGELINA MCFADDEN   TECHNIQUE: CT of the abdomen and pelvis was performed. Contiguous axial images were obtained at 3 mm slice thickness through the abdomen and pelvis. Coronal and sagittal reconstructions at 3 mm slice thickness were performed.  No intravenous or oral contrast agents were administered.   FINDINGS: Please note that the evaluation of vessels, lymph nodes and organs is limited without  intravenous contrast.   LOWER CHEST: There are bands of atelectasis in the lung bases and right middle lobe. No pleural effusion. Gynecomastia noted.   ABDOMEN:   LIVER: Normal size, contour, and density. No focal lesion evident.   GALLBLADDER: Normal-size. Hazy increased density in the dependent portion of the gallbladder, likely sludge. No definite radiopaque gallstone.   BILE DUCTS: Normal caliber.   PANCREAS: The pancreas appears within normal limits, no evidence of pancreatitis or mass.   SPLEEN: Normal size.   ADRENAL GLANDS: Within normal limits.   KIDNEYS AND URETERS: Bilateral perinephric edema. No hydronephrosis. Bilateral nonobstructing calculi are present. The largest is in the right renal pelvis, triangular shaped, 1.3 cm. No ureteral stone. No focal lesion evident.   PELVIS:   BLADDER: Within normal limits.   REPRODUCTIVE ORGANS: Mild prostate enlargement.   BOWEL: The stomach is unremarkable. The small bowel is normal in caliber without evidence of focal wall thickening or obstruction. There is no evidence of focal wall thickening or dilatation of the large bowel. Markedly abnormal appendix is diffusely thickened and dilated, 1.8 cm diameter, with significant surrounding fat stranding, extends along right paracolic gutter up to near the gallbladder fossa. There is calcific density column of material in the proximal appendix which may represent 1 or more appendicoliths although difficult to measure. Significant fat stranding present around the appendix, there is no definite fluid collection or abscess. No evidence of perforation.   VESSELS: Normal caliber aorta. Atherosclerosis present.   PERITONEUM/RETROPERITONEUM/LYMPH NODES: Retroperitoneal edema bilateral perinephric space and along the paracolic gutters. Edema surrounds the dilated appendix, however no significant free fluid. No pneumoperitoneum. No adenopathy.   BONES AND ABDOMINAL WALL: No evidence of acute fracture. No suspicious osseous  lesions are identified.Mild degenerative changes in the spine and hips. Small fat containing left inguinal hernia.       1.  Acute appendicitis. There is a markedly dilated (1.8 cm) and thick-walled appendix containing some radiopaque material proximally, suspect there is probably an appendicolith within although difficult to measure. Periappendiceal fat stranding is present, however no definite abscess or evidence of perforation. 2. Bilateral nonobstructing nephrolithiasis. Perinephric edema is symmetric and nonspecific. Correlate for any clinical evidence of UTI. 3. Ancillary findings as above.     Signed by: Anna Stephens 9/22/2024 4:09 PM Dictation workstation:   [id number]       Lab Results  Results for orders placed or performed during the hospital encounter of 09/22/24 (from the past 24 hour(s))   POCT GLUCOSE   Result Value Ref Range    POCT Glucose 137 (H) 74 - 99 mg/dL   Urinalysis with Reflex Microscopic   Result Value Ref Range    Color, Urine Light-Yellow Light-Yellow, Yellow, Dark-Yellow    Appearance, Urine Turbid (N) Clear    Specific Gravity, Urine 1.025 1.005 - 1.035    pH, Urine 5.0 5.0, 5.5, 6.0, 6.5, 7.0, 7.5, 8.0    Protein, Urine 20 (TRACE) NEGATIVE, 10 (TRACE), 20 (TRACE) mg/dL    Glucose, Urine OVER (4+) (A) Normal mg/dL    Blood, Urine 0.2 (2+) (A) NEGATIVE    Ketones, Urine TRACE (A) NEGATIVE mg/dL    Bilirubin, Urine NEGATIVE NEGATIVE    Urobilinogen, Urine Normal Normal mg/dL    Nitrite, Urine NEGATIVE NEGATIVE    Leukocyte Esterase, Urine NEGATIVE NEGATIVE   Microscopic Only, Urine   Result Value Ref Range    WBC, Urine NONE 1-5, NONE /HPF    RBC, Urine >20 (A) NONE, 1-2, 3-5 /HPF    Mucus, Urine FEW Reference range not established. /LPF   POCT GLUCOSE   Result Value Ref Range    POCT Glucose 196 (H) 74 - 99 mg/dL   CBC   Result Value Ref Range    WBC 11.0 4.4 - 11.3 x10*3/uL    nRBC 0.0 0.0 - 0.0 /100 WBCs    RBC 2.82 (L) 4.50 - 5.90 x10*6/uL    Hemoglobin 8.9 (L) 13.5 - 17.5 g/dL     Hematocrit 26.0 (L) 41.0 - 52.0 %    MCV 92 80 - 100 fL    MCH 31.6 26.0 - 34.0 pg    MCHC 34.2 32.0 - 36.0 g/dL    RDW 11.6 11.5 - 14.5 %    Platelets 282 150 - 450 x10*3/uL   Basic Metabolic Panel   Result Value Ref Range    Glucose 124 (H) 74 - 99 mg/dL    Sodium 139 136 - 145 mmol/L    Potassium 3.3 (L) 3.5 - 5.3 mmol/L    Chloride 106 98 - 107 mmol/L    Bicarbonate 23 21 - 32 mmol/L    Anion Gap 13 10 - 20 mmol/L    Urea Nitrogen 62 (H) 6 - 23 mg/dL    Creatinine 2.37 (H) 0.50 - 1.30 mg/dL    eGFR 30 (L) >60 mL/min/1.73m*2    Calcium 7.5 (L) 8.6 - 10.3 mg/dL   Magnesium   Result Value Ref Range    Magnesium 3.23 (H) 1.60 - 2.40 mg/dL   POCT GLUCOSE   Result Value Ref Range    POCT Glucose 128 (H) 74 - 99 mg/dL        Medications  Scheduled medications:  [Held by provider] chlorthalidone, 25 mg, oral, Daily  ciprofloxacin, 500 mg, oral, q24h  [Held by provider] empagliflozin, 10 mg, oral, Daily  [Held by provider] losartan, 25 mg, oral, Daily  metoprolol succinate XL, 25 mg, oral, Daily  metroNIDAZOLE, 500 mg, oral, q8h ADELITA  rosuvastatin, 10 mg, oral, Nightly      Continuous medications:       PRN medications:  PRN medications: acetaminophen, morphine, morphine, ondansetron, promethazine     Physical Exam  Constitutional:       General: He is not in acute distress.     Appearance: Normal appearance.      Comments: Ambulating independently in the room upon my entrance   HENT:      Head: Normocephalic and atraumatic.      Right Ear: External ear normal.      Left Ear: External ear normal.      Nose: Nose normal.      Mouth/Throat:      Mouth: Mucous membranes are moist.      Pharynx: Oropharynx is clear.   Eyes:      Extraocular Movements: Extraocular movements intact.      Conjunctiva/sclera: Conjunctivae normal.      Pupils: Pupils are equal, round, and reactive to light.   Cardiovascular:      Rate and Rhythm: Normal rate and regular rhythm.      Pulses: Normal pulses.      Heart sounds: Normal heart sounds.    Pulmonary:      Effort: Pulmonary effort is normal. No respiratory distress.      Breath sounds: Normal breath sounds. No wheezing, rhonchi or rales.   Abdominal:      General: Bowel sounds are normal.      Palpations: Abdomen is soft.      Tenderness: There is abdominal tenderness. There is no right CVA tenderness, left CVA tenderness, guarding or rebound.      Comments: LLQ drain removed   Musculoskeletal:         General: No swelling. Normal range of motion.      Cervical back: Normal range of motion and neck supple.      Right lower leg: Edema present.      Left lower leg: Edema present.   Skin:     General: Skin is warm and dry.      Capillary Refill: Capillary refill takes less than 2 seconds.      Findings: No lesion or rash.   Neurological:      General: No focal deficit present.      Mental Status: He is alert and oriented to person, place, and time. Mental status is at baseline.   Psychiatric:         Mood and Affect: Mood normal.         Behavior: Behavior normal.                  Assessment/Plan      Acute perforated appendicitis with abscesss/p laparoscopic converted to open appendectomy 9/22/2024 with sepsis   -Managed by primary care service team  -Cipro and flagyl- renally dose- can complete course 9/28/24  -Diet per general surgery, IVF hydration until able to maintain own hydration with PO intake   -Pain meds, antiemetics as needed  -Follow culture- e. Coli, pan-sensitive   -Sepsis POA. Endorgan dysfunction: MARICARMEN     MARICARMEN/ATN on CKD stage IIIa  -BUN/creatinine 89/2.27 on admission, baseline creatinine 1.50-1.60  -Hold losartan, chlorthalidone  -Continue  mL/h  -Continue to trend while admitted  -Nephrology consult  -Renal US without hydronephrosis      HTN  -Hold losartan, chlorthalidone given his MARICARMEN  -Hold amlodipine given BLE edema (has happened to him previously without diuretics)  -Continue home metoprolol     HLD  -continue on statin     DM-II   -A1C of 6.0 on 8/20/24  -continue home  jardiance     Anemia  -Hgb 10.2, stable and at near baseline  -Transfuse RBC for hemoglobin less than 7 or greater than 7 with signs of bleeding and hemodynamic instability     Code Status: Full Code     DVT ppx: Per primary      Please see orders for more complete plan    Thank you for involving us in the care of this very pleasant patient. We will follow along with you while they remain admitted. Please contact Hospitalist service if any clarification needed.     Ayden Galdamez MD

## 2024-09-28 NOTE — CARE PLAN
The patient's goals for the shift include      The clinical goals for the shift include Patient safety will be maintained throughout the shift      Problem: Pain - Adult  Goal: Verbalizes/displays adequate comfort level or baseline comfort level  Outcome: Progressing     Problem: Safety - Adult  Goal: Free from fall injury  Outcome: Progressing     Problem: Discharge Planning  Goal: Discharge to home or other facility with appropriate resources  Outcome: Progressing     Problem: Chronic Conditions and Co-morbidities  Goal: Patient's chronic conditions and co-morbidity symptoms are monitored and maintained or improved  Outcome: Progressing     Problem: Diabetes  Goal: Achieve decreasing blood glucose levels by end of shift  Outcome: Progressing  Goal: Increase stability of blood glucose readings by end of shift  Outcome: Progressing  Goal: Decrease in ketones present in urine by end of shift  Outcome: Progressing  Goal: Maintain electrolyte levels within acceptable range throughout shift  Outcome: Progressing  Goal: Maintain glucose levels >70mg/dl to <250mg/dl throughout shift  Outcome: Progressing  Goal: No changes in neurological exam by end of shift  Outcome: Progressing  Goal: Learn about and adhere to nutrition recommendations by end of shift  Outcome: Progressing  Goal: Vital signs within normal range for age by end of shift  Outcome: Progressing  Goal: Increase self care and/or family involovement by end of shift  Outcome: Progressing  Goal: Receive DSME education by end of shift  Outcome: Progressing     Problem: Pain  Goal: Takes deep breaths with improved pain control throughout the shift  Outcome: Progressing  Goal: Turns in bed with improved pain control throughout the shift  Outcome: Progressing  Goal: Walks with improved pain control throughout the shift  Outcome: Progressing  Goal: Performs ADL's with improved pain control throughout shift  Outcome: Progressing  Goal: Participates in PT with improved  pain control throughout the shift  Outcome: Progressing  Goal: Free from opioid side effects throughout the shift  Outcome: Progressing  Goal: Free from acute confusion related to pain meds throughout the shift  Outcome: Progressing

## 2024-09-28 NOTE — DISCHARGE SUMMARY
Discharge Diagnosis    Acute appendicitis with appendiceal abscess  MARICARMEN on top of chronic kidney disease    Issues Requiring Follow-Up  See after visit summary for complete plan.    Discharge Meds     Medication List      START taking these medications     acetaminophen 325 mg tablet; Commonly known as: Tylenol; Take 2 tablets   (650 mg) by mouth every 4 hours if needed for mild pain (1 - 3), moderate   pain (4 - 6), headaches or fever (temp greater than 38.0 C).   ciprofloxacin 500 mg tablet; Commonly known as: Cipro; Take 1 tablet   (500 mg) by mouth once every 24 hours for 2 days.; Start taking on:   September 29, 2024   metroNIDAZOLE 500 mg tablet; Commonly known as: Flagyl; Take 1 tablet   (500 mg) by mouth every 8 hours.     CHANGE how you take these medications     amLODIPine 5 mg tablet; Commonly known as: Norvasc; TAKE 1 TABLET BY   MOUTH EVERY DAY; What changed: how much to take     CONTINUE taking these medications     cholecalciferol 125 MCG (5000 UT) capsule; Commonly known as: Vitamin   D-3   Eylea 2 mg/0.05 mL intra-ocular injection; Generic drug: aflibercept   metoprolol succinate XL 25 mg 24 hr tablet; Commonly known as: Toprol-XL   ONE DAILY FOR MEN ORAL   rosuvastatin 10 mg tablet; Commonly known as: Crestor; Take 1 tablet (10   mg) by mouth once daily.     STOP taking these medications     chlorthalidone 25 mg tablet; Commonly known as: Hygroton   Jardiance 10 mg; Generic drug: empagliflozin   losartan 25 mg tablet; Commonly known as: Cozaar       Test Results Pending At Discharge  Pending Labs       Order Current Status    Creatine Disorder Panel, Urine In process    Surgical Pathology Exam In process            Hospital Course   colin White is a 64 y.o. male on day 6 of admission presenting with Acute appendicitis with appendiceal abscess.           Subjective  Gilberto White is a 63 y.o. male with past medical history s/f HTN, HLD, bilateral renal artery stenosis, T2DM, CKD stage IIIa  who presented 9/22/24 with RUQ/R flank pain x1 week, found to have acute appendicitis (CT abdomen pelvis shows markedly dilated and thick-walled appendix containing radiopaque material approximately suspect appendicolith with associated periappendiceal fat stranding with no clear abscess or perforation) status post appendectomy laparoscopy on 9/22/2024. Medicine was consulted for medical management. Patient noted to have MARICARMEN/ATN on CKD. Renal US without hydronephrosis. Treated with conservative management/IVF     9/27/2024: No acute events overnight. Vitals stable, afebrile. Cr slightly rising 2.69 today (baseline ~1.5). No metabolic acidosis. Leukocytosis resolved today. Hgb stable 9.2  Discussed renal dysfunction with nephrology, recommend monitoring in house until ATN shows peak, then likely can dc and monitor outpatient   Op cx- abundant e. Coli, pansensitive- changed to cipro/flagyl by primary service, will adjust cipro dose for renal function (total of 7 days)  Bl cx x2- ng x3 days  Had 2 Bms yesterday  Having some BLE edema today, he attributes this to taking amlodipine without chlorthalidone which he repiorts had happened to him before and why the chlorthalidone was started. Will stop amlodipine, monitor BPs      Hospitalist group will take over care from here as he is surgically ready to discharge from the hospital. DC planning once renal function improves       9/28: nephrology feels patient okay for discharge home as well as surgery will send home on 2 more days of antibiotics.      see after visit summary for complete plan      35 minutes spent in the care of this patient.       Pertinent Physical Exam At Time of Discharge  Physical Exam  See today's progress note for more physical exam findings  Outpatient Follow-Up  Future Appointments   Date Time Provider Department Center   1/3/2025 10:45 AM Federico Katz MD [id number] Chet Galdamez MD

## 2024-09-28 NOTE — NURSING NOTE
Patient verbalizes understanding of discharge instructions and home meds. Patient is D/C'd home via ambulatory. No s/sx of distress noted.

## 2024-09-28 NOTE — CARE PLAN
The patient's goals for the shift include      The clinical goals for the shift include Patient will be free from pain this shift.    Over the shift, the patient denies pain. States he has been comfortable this shift.

## 2024-09-28 NOTE — PROGRESS NOTES
Gilberto White is a 64 y.o. male on day 6 of admission presenting with Acute appendicitis with appendiceal abscess.      Subjective   .Interval History    Patient is doing  well  Sitting in chair    ROS  Denies chest pain, shortness of breath,  Nausea, vomiting , diarrhea, fever or chills.     No change in Past Medical History        Objective          Vitals 24HR  Heart Rate:  [72-77]   Temp:  [36.6 °C (97.9 °F)-37.3 °C (99.1 °F)]   Resp:  [15-17]   BP: (127-150)/(62-70)   SpO2:  [96 %-99 %]     .  Vitals:    09/27/24 2102 09/28/24 0110 09/28/24 0446 09/28/24 0900   BP: 127/62 128/62 150/68 138/70   BP Location: Left arm Left arm Right arm Left arm   Patient Position: Sitting Lying Sitting Sitting   Pulse: 77 72 73 75   Resp: 16 16 15 16   Temp: 37 °C (98.6 °F) 37.3 °C (99.1 °F) 36.6 °C (97.9 °F) 36.9 °C (98.5 °F)   TempSrc: Temporal Temporal Temporal Temporal   SpO2: 99% 96% 97% 98%   Weight:       Height:            Intake/Output last 3 Shifts:    Intake/Output Summary (Last 24 hours) at 9/28/2024 1045  Last data filed at 9/28/2024 1010  Gross per 24 hour   Intake 850 ml   Output --   Net 850 ml     .  Results from last 7 days   Lab Units 09/28/24 0536 09/27/24 0514 09/26/24  0459   SODIUM mmol/L 139 138 137   POTASSIUM mmol/L 3.3* 3.6 4.0   CHLORIDE mmol/L 106 107 104   CO2 mmol/L 23 23 21   BUN mg/dL 62* 67* 68*   CREATININE mg/dL 2.37* 2.69* 2.51*   EGFR mL/min/1.73m*2 30* 26* 28*   GLUCOSE mg/dL 124* 109* 119*   CALCIUM mg/dL 7.5* 7.4* 7.5*    .  Results from last 7 days   Lab Units 09/28/24  0536 09/27/24  0514 09/26/24  0459   WBC AUTO x10*3/uL 11.0 9.7  9.7 11.9*   HEMOGLOBIN g/dL 8.9* 9.2*  9.2* 9.0*   HEMATOCRIT % 26.0* 27.1*  27.1* 27.3*   PLATELETS AUTO x10*3/uL 282 260  260 271      Physical Exam  Constitutional:       Appearance: Normal appearance.   HENT:      Mouth/Throat:      Mouth: Mucous membranes are moist.      Pharynx: Oropharynx is clear.   Eyes:      Extraocular Movements:  Extraocular movements intact.      Pupils: Pupils are equal, round, and reactive to light.   Cardiovascular:      Rate and Rhythm: Normal rate and regular rhythm.      Pulses: Normal pulses.      Heart sounds: Normal heart sounds.   Pulmonary:      Effort: Pulmonary effort is normal.      Breath sounds: Normal breath sounds.   Abdominal:      General: Abdomen is flat. Bowel sounds are normal.      Palpations: Abdomen is soft.   Musculoskeletal:      Right lower leg: Edema present.      Left lower leg: Edema present.   Skin:     General: Skin is warm and dry.      Capillary Refill: Capillary refill takes 2 to 3 seconds.   Neurological:      Mental Status: He is alert and oriented to person, place, and time. Mental status is at baseline.   Psychiatric:         Mood and Affect: Mood normal.         Behavior: Behavior normal.           Assessment/Plan      MARICARMEN/ATN 2/2 Acute perforated appendicitis with abscess s/p appendectomy  (N17.0)  Azotemia (R79.89)  Metabolic acidosis E87.20  CKD3a  N18.31  Diabetes type 2   HTN  Hypocalcemia   Anemia in the setting CKD3a  HLD  Leukocytosis with acute appendicitis and appendiceal abscess K35.33    Recommendations   Scr slightly  down , Acute kidney injury consistent with acute tubular necrosis baseline creatinine 1.5-1.8    Turbid urine with hematuria. No eosinophilia on cbc with diff. Off zosyn now gfr improved.   Hypertension with hypertensive chronic kidney disease   + edema off norvas now  Agree with holding SGLT2 inhibitor therapy as well at this time.  On discharge continue to hold chlorthalidone Jardiance and losartan all of them will potentially need to be restarted on a close outpatient follow-up.  Patient is established in office with me and knows how to reach out.  Your rx for appendicitis   Acidosis improved

## 2024-09-30 ENCOUNTER — PATIENT OUTREACH (OUTPATIENT)
Dept: PRIMARY CARE | Facility: CLINIC | Age: 64
End: 2024-09-30
Payer: COMMERCIAL

## 2024-09-30 LAB
LABORATORY COMMENT REPORT: NORMAL
PATH REPORT.FINAL DX SPEC: NORMAL
PATH REPORT.GROSS SPEC: NORMAL
PATH REPORT.RELEVANT HX SPEC: NORMAL
PATH REPORT.TOTAL CANCER: NORMAL

## 2024-09-30 NOTE — PROGRESS NOTES
Discharge Facility: [facility]   Discharge Diagnosis: Acute appendicitis with appendiceal abscess  MARICARMEN on top of chronic kidney disease  Admission Date: 9/22/24  Discharge Date: 9/28/24    PCP Appointment Date: no appointment, message to office  Specialist Appointment Date: Harsha Davis MD (General Surgery) in 2 weeks (10/12/2024), Follow up with Alexey Keita MD (Nephrology)  Hospital Encounter and Summary Linked: Yes  See discharge assessment below for further details    Medications  Medications reviewed with patient/caregiver?: Yes (9/30/2024 10:08 AM)  Is the patient having any side effects they believe may be caused by any medication additions or changes?: No (9/30/2024 10:08 AM)  Does the patient have all medications ordered at discharge?: Yes (9/30/2024 10:08 AM)  Medication Comments: START taking:  acetaminophen (Tylenol)   ciprofloxacin (Cipro) metroNIDAZOLE (Flagyl)    STOP taking:  chlorthalidone 25 mg tablet (Hygroton)   Jardiance 10 mg (empagliflozin)   losartan 25 mg tablet (Cozaar) (9/30/2024 10:08 AM)    Appointments  Does the patient have a primary care provider?: Yes (9/30/2024 10:08 AM)  Care Management Interventions: Advised patient to make appointment (9/30/2024 10:08 AM)  Care Management Interventions: Advised to schedule with specialist (9/30/2024 10:08 AM)    Self Management  What is the home health agency?: n/a (9/30/2024 10:08 AM)  What Durable Medical Equipment (DME) was ordered?: n/a (9/30/2024 10:08 AM)    Patient Teaching  Does the patient have access to their discharge instructions?: Yes (9/30/2024 10:08 AM)  Care Management Interventions: Reviewed instructions with patient (9/30/2024 10:08 AM)  What is the patient's perception of their health status since discharge?: Improving (9/30/2024 10:08 AM)  Patient/Caregiver Education Comments: Patient reports feeling sore though overall managing well. He has not required tylenol for pain thus far. Reviewed follow up  appointments and medication changes. Reviewed discharge instructions, patient verbalizes understanding. Denies any questions or concerns. (9/30/2024 10:08 AM)

## 2024-10-02 LAB
CREAT UR-SCNC: 3937.5 UMOL/L
CREATINE URINE DISORDER URINE INTERPRETATION FOR CDP: NORMAL
CREATINE/CREAT UR-SRTO: 25 MMOL/MOL CRT (ref 10–370)
GUANIDINOACETATE/CREAT UR-SRTO: 1 MMOL/MOL CRT (ref 7–130)

## 2024-10-03 ENCOUNTER — LAB (OUTPATIENT)
Dept: LAB | Facility: LAB | Age: 64
End: 2024-10-03
Payer: COMMERCIAL

## 2024-10-03 ENCOUNTER — OFFICE VISIT (OUTPATIENT)
Dept: PRIMARY CARE | Facility: CLINIC | Age: 64
End: 2024-10-03
Payer: COMMERCIAL

## 2024-10-03 VITALS
RESPIRATION RATE: 16 BRPM | BODY MASS INDEX: 27.14 KG/M2 | HEIGHT: 68 IN | WEIGHT: 179.1 LBS | OXYGEN SATURATION: 97 % | SYSTOLIC BLOOD PRESSURE: 144 MMHG | HEART RATE: 78 BPM | TEMPERATURE: 98.4 F | DIASTOLIC BLOOD PRESSURE: 76 MMHG

## 2024-10-03 DIAGNOSIS — K35.33 ACUTE APPENDICITIS WITH APPENDICEAL ABSCESS: Primary | ICD-10-CM

## 2024-10-03 DIAGNOSIS — N18.32 STAGE 3B CHRONIC KIDNEY DISEASE (MULTI): ICD-10-CM

## 2024-10-03 DIAGNOSIS — Z48.02 ENCOUNTER FOR STAPLE REMOVAL: ICD-10-CM

## 2024-10-03 DIAGNOSIS — R60.0 LOWER EXTREMITY EDEMA: ICD-10-CM

## 2024-10-03 DIAGNOSIS — E87.6 HYPOKALEMIA: ICD-10-CM

## 2024-10-03 DIAGNOSIS — I10 ESSENTIAL HYPERTENSION: ICD-10-CM

## 2024-10-03 LAB
ALBUMIN SERPL BCP-MCNC: 3.3 G/DL (ref 3.4–5)
ANION GAP SERPL CALC-SCNC: 11 MMOL/L (ref 10–20)
BUN SERPL-MCNC: 28 MG/DL (ref 6–23)
CALCIUM SERPL-MCNC: 8.4 MG/DL (ref 8.6–10.3)
CHLORIDE SERPL-SCNC: 109 MMOL/L (ref 98–107)
CO2 SERPL-SCNC: 27 MMOL/L (ref 21–32)
CREAT SERPL-MCNC: 1.48 MG/DL (ref 0.5–1.3)
EGFRCR SERPLBLD CKD-EPI 2021: 53 ML/MIN/1.73M*2
GLUCOSE SERPL-MCNC: 149 MG/DL (ref 74–99)
PHOSPHATE SERPL-MCNC: 2.6 MG/DL (ref 2.5–4.9)
POTASSIUM SERPL-SCNC: 4.6 MMOL/L (ref 3.5–5.3)
SODIUM SERPL-SCNC: 142 MMOL/L (ref 136–145)

## 2024-10-03 PROCEDURE — 1036F TOBACCO NON-USER: CPT

## 2024-10-03 PROCEDURE — 3077F SYST BP >= 140 MM HG: CPT

## 2024-10-03 PROCEDURE — 3060F POS MICROALBUMINURIA REV: CPT

## 2024-10-03 PROCEDURE — 3078F DIAST BP <80 MM HG: CPT

## 2024-10-03 PROCEDURE — 3050F LDL-C >= 130 MG/DL: CPT

## 2024-10-03 PROCEDURE — 3008F BODY MASS INDEX DOCD: CPT

## 2024-10-03 PROCEDURE — 36415 COLL VENOUS BLD VENIPUNCTURE: CPT

## 2024-10-03 PROCEDURE — 80069 RENAL FUNCTION PANEL: CPT

## 2024-10-03 PROCEDURE — 3044F HG A1C LEVEL LT 7.0%: CPT

## 2024-10-03 PROCEDURE — 99214 OFFICE O/P EST MOD 30 MIN: CPT

## 2024-10-03 PROCEDURE — 99496 TRANSJ CARE MGMT HIGH F2F 7D: CPT

## 2024-10-03 RX ORDER — FUROSEMIDE 20 MG/1
20 TABLET ORAL DAILY
Qty: 5 TABLET | Refills: 0 | Status: SHIPPED | OUTPATIENT
Start: 2024-10-03 | End: 2025-10-03

## 2024-10-03 RX ORDER — POTASSIUM CHLORIDE 750 MG/1
TABLET, FILM COATED, EXTENDED RELEASE ORAL
Qty: 3 TABLET | Refills: 0 | Status: SHIPPED | OUTPATIENT
Start: 2024-10-03

## 2024-10-03 SDOH — ECONOMIC STABILITY: FOOD INSECURITY: WITHIN THE PAST 12 MONTHS, YOU WORRIED THAT YOUR FOOD WOULD RUN OUT BEFORE YOU GOT MONEY TO BUY MORE.: NEVER TRUE

## 2024-10-03 SDOH — ECONOMIC STABILITY: FOOD INSECURITY: WITHIN THE PAST 12 MONTHS, THE FOOD YOU BOUGHT JUST DIDN'T LAST AND YOU DIDN'T HAVE MONEY TO GET MORE.: NEVER TRUE

## 2024-10-03 ASSESSMENT — LIFESTYLE VARIABLES
SKIP TO QUESTIONS 9-10: 1
HOW OFTEN DO YOU HAVE SIX OR MORE DRINKS ON ONE OCCASION: NEVER
HOW OFTEN DO YOU HAVE A DRINK CONTAINING ALCOHOL: NEVER
AUDIT-C TOTAL SCORE: 0
HOW MANY STANDARD DRINKS CONTAINING ALCOHOL DO YOU HAVE ON A TYPICAL DAY: PATIENT DOES NOT DRINK

## 2024-10-03 ASSESSMENT — PATIENT HEALTH QUESTIONNAIRE - PHQ9
2. FEELING DOWN, DEPRESSED OR HOPELESS: NOT AT ALL
SUM OF ALL RESPONSES TO PHQ9 QUESTIONS 1 AND 2: 0
SUM OF ALL RESPONSES TO PHQ9 QUESTIONS 1 & 2: 0
1. LITTLE INTEREST OR PLEASURE IN DOING THINGS: NOT AT ALL
2. FEELING DOWN, DEPRESSED OR HOPELESS: NOT AT ALL
2. FEELING DOWN, DEPRESSED OR HOPELESS: NOT AT ALL
SUM OF ALL RESPONSES TO PHQ9 QUESTIONS 1 AND 2: 0
1. LITTLE INTEREST OR PLEASURE IN DOING THINGS: NOT AT ALL
1. LITTLE INTEREST OR PLEASURE IN DOING THINGS: NOT AT ALL

## 2024-10-03 ASSESSMENT — ENCOUNTER SYMPTOMS
CARDIOVASCULAR NEGATIVE: 1
ENDOCRINE NEGATIVE: 1
MUSCULOSKELETAL NEGATIVE: 1
CONSTITUTIONAL NEGATIVE: 1
DEPRESSION: 0
LOSS OF SENSATION IN FEET: 0
NEUROLOGICAL NEGATIVE: 1
EYES NEGATIVE: 1
PSYCHIATRIC NEGATIVE: 1
RESPIRATORY NEGATIVE: 1
OCCASIONAL FEELINGS OF UNSTEADINESS: 0
HEMATOLOGIC/LYMPHATIC NEGATIVE: 1
GASTROINTESTINAL NEGATIVE: 1

## 2024-10-03 ASSESSMENT — COLUMBIA-SUICIDE SEVERITY RATING SCALE - C-SSRS
2. HAVE YOU ACTUALLY HAD ANY THOUGHTS OF KILLING YOURSELF?: NO
1. IN THE PAST MONTH, HAVE YOU WISHED YOU WERE DEAD OR WISHED YOU COULD GO TO SLEEP AND NOT WAKE UP?: NO
6. HAVE YOU EVER DONE ANYTHING, STARTED TO DO ANYTHING, OR PREPARED TO DO ANYTHING TO END YOUR LIFE?: NO
1. IN THE PAST MONTH, HAVE YOU WISHED YOU WERE DEAD OR WISHED YOU COULD GO TO SLEEP AND NOT WAKE UP?: NO
6. HAVE YOU EVER DONE ANYTHING, STARTED TO DO ANYTHING, OR PREPARED TO DO ANYTHING TO END YOUR LIFE?: NO
2. HAVE YOU ACTUALLY HAD ANY THOUGHTS OF KILLING YOURSELF?: NO

## 2024-10-03 ASSESSMENT — ACTIVITIES OF DAILY LIVING (ADL)
MANAGING_FINANCES: INDEPENDENT
DRESSING: INDEPENDENT
TAKING_MEDICATION: INDEPENDENT
BATHING: INDEPENDENT
DOING_HOUSEWORK: INDEPENDENT
GROCERY_SHOPPING: INDEPENDENT

## 2024-10-03 ASSESSMENT — PAIN SCALES - GENERAL: PAINLEVEL: 0-NO PAIN

## 2024-10-03 NOTE — ASSESSMENT & PLAN NOTE
Mildly hypertensive in office at 144/76  Nephrology managing blood pressure medications; currently amlodipine 2.5mg daily and chlorthalidone, losartan 25mg daily and Jardiance 10mg on hold.     Advised to monitor blood pressures at home and that if SBP >150 take dose of amlodipine 2.5mg daily  Advised to record readings and when taking doses of amlodipine, bring log to next office visit with nephrology

## 2024-10-03 NOTE — PROGRESS NOTES
"Patient: Gilberto White  : 1960  PCP: Federico Katz MD  MRN: [id number]  Program: Transitional Care Management  Status: Enrolled  Effective Dates: 2024 - present  Responsible Staff: Mara Infante RN  Social Determinants to be Addressed: Social Connections, Stress         Gilberto White is a 64 y.o. male presenting today for follow-up after being discharged from the hospital 5 days ago. The main problem requiring admission was appendicitis with MARICARMEN on CKD. The discharge summary and/or Transitional Care Management documentation was reviewed. Medication reconciliation was performed as indicated via the \"Cecil as Reviewed\" timestamp.     Gilberto White was contacted by Transitional Care Management services two days after his discharge. This encounter and supporting documentation was reviewed.]    Gilberto White is a 63 y.o. male with past medical history s/f HTN, HLD, bilateral renal artery stenosis, T2DM, CKD stage IIIa who presented 24 with RUQ/R flank pain x1 week, found to have acute appendicitis (CT abdomen pelvis shows markedly dilated and thick-walled appendix containing radiopaque material approximately suspect appendicolith with associated periappendiceal fat stranding with no clear abscess or perforation) status post appendectomy laparoscopy on 2024. Medicine was consulted for medical management. Patient noted to have MARICARMEN/ATN on CKD. Renal US without hydronephrosis. Treated with conservative management/IVF     Review of Systems   Constitutional: Negative.    HENT: Negative.     Eyes: Negative.    Respiratory: Negative.     Cardiovascular: Negative.    Gastrointestinal: Negative.    Endocrine: Negative.    Genitourinary: Negative.    Musculoskeletal: Negative.    Skin: Negative.    Neurological: Negative.    Hematological: Negative.    Psychiatric/Behavioral: Negative.         /76 (BP Location: Left arm, Patient Position: Sitting, BP Cuff Size: Adult)   Pulse 78   " "Temp 36.9 °C (98.4 °F) (Temporal)   Resp 16   Ht 1.727 m (5' 8\")   Wt 81.2 kg (179 lb 1.6 oz)   SpO2 97%   BMI 27.23 kg/m²     Physical Exam  Constitutional:       Appearance: Normal appearance.   HENT:      Head: Normocephalic and atraumatic.   Eyes:      Extraocular Movements: Extraocular movements intact.      Pupils: Pupils are equal, round, and reactive to light.   Cardiovascular:      Rate and Rhythm: Normal rate and regular rhythm.   Pulmonary:      Effort: Pulmonary effort is normal.      Breath sounds: Normal breath sounds.   Abdominal:      General: Abdomen is flat. Bowel sounds are normal.      Palpations: Abdomen is soft.   Musculoskeletal:         General: Normal range of motion.   Skin:     General: Skin is warm and dry.      Capillary Refill: Capillary refill takes less than 2 seconds.   Neurological:      General: No focal deficit present.      Mental Status: He is alert and oriented to person, place, and time.   Psychiatric:         Mood and Affect: Mood normal.         Behavior: Behavior normal.           The complexity of medical decision making for this patient's transitional care is high.    Assessment/Plan   Problem List Items Addressed This Visit             ICD-10-CM    Essential hypertension I10     Mildly hypertensive in office at 144/76  Nephrology managing blood pressure medications; currently amlodipine 2.5mg daily and chlorthalidone, losartan 25mg daily and Jardiance 10mg on hold.     Advised to monitor blood pressures at home and that if SBP >150 take dose of amlodipine 2.5mg daily  Advised to record readings and when taking doses of amlodipine, bring log to next office visit with nephrology         CKD (chronic kidney disease) N18.9    Relevant Medications    furosemide (Lasix) 20 mg tablet    Other Relevant Orders    Renal function panel    Acute appendicitis with appendiceal abscess - Primary K35.33     Admitted to [facility] 9/22-28/2024 for appendicitis and MARICARMEN on CKD. S/P " laprascopic appendectomy on 9/22/2024 with Dr. Davis.  Denies abdominal pain, swelling  Midline incision well approximated with 10 staples in place, removed today    Scheduled for follow up with Dr. Davis on 10/16          Other Visit Diagnoses         Codes    Lower extremity edema     R60.0    3+ bilateral pitting edema, chlorthalidone discontinued in hospital for surgery  RFP today  Start furosemide 20mg daily x 5 days     Hypokalemia     E87.6    Relevant Medications    potassium chloride CR (Klor-Con) 10 mEq ER tablet    Encounter for staple removal     Z48.02          Patient is s/p midline abdominal surgical incision 11  days ago, which required closure with 10 staples.  He denies pain, redness, or drainage from the wound.     Post surgical exam:  An 18 cm incision noted on the midline abdomen is healing well, without evidence of infection.    Assessment/Plan   Laceration is healing well, without evidence of infection.    1. 10 staples were removed.  2. Wound care discussed.  3. Follow up as needed.      Follow up with Dr. Katz in January as previously scheduled    [id number]

## 2024-10-03 NOTE — PATIENT INSTRUCTIONS
Thank you for coming to see me today.  If you have any questions or concerns following our visit, please contact the office.  Phone: [phone]    Follow up with Dr. Moon in January as previously scheduled    1)  START furosemide 20mg daily in the morning for up to 5 days. Wear compression stockings and keep legs elevated    2) Hold amlodipine unless systolic (top number) of blood pressure 150 or higher. Record values and when taking amlodipine and report back to kidney team    3) Renal function panel today to check kidney function    4) START potassium chloride 10mEq, take 1 tablet every other day with furosemide for a total of 3 doses

## 2024-10-03 NOTE — ASSESSMENT & PLAN NOTE
Admitted to [facility] 9/22-28/2024 for appendicitis and MARICARMEN on CKD. S/P laprascopic appendectomy on 9/22/2024 with Dr. Davis.  Denies abdominal pain, swelling  Midline incision well approximated with 10 staples in place, removed today    Scheduled for follow up with Dr. Davis on 10/16

## 2024-10-10 ENCOUNTER — LAB (OUTPATIENT)
Dept: LAB | Facility: LAB | Age: 64
End: 2024-10-10
Payer: COMMERCIAL

## 2024-10-10 DIAGNOSIS — N18.31 CHRONIC KIDNEY DISEASE, STAGE 3A (MULTI): Primary | ICD-10-CM

## 2024-10-10 LAB
25(OH)D3 SERPL-MCNC: 62 NG/ML (ref 30–100)
ALBUMIN SERPL BCP-MCNC: 3.5 G/DL (ref 3.4–5)
ANION GAP SERPL CALC-SCNC: 12 MMOL/L (ref 10–20)
APPEARANCE UR: CLEAR
BILIRUB UR STRIP.AUTO-MCNC: NEGATIVE MG/DL
BUN SERPL-MCNC: 25 MG/DL (ref 6–23)
CALCIUM SERPL-MCNC: 8.8 MG/DL (ref 8.6–10.3)
CHLORIDE SERPL-SCNC: 105 MMOL/L (ref 98–107)
CO2 SERPL-SCNC: 26 MMOL/L (ref 21–32)
COLOR UR: YELLOW
CREAT SERPL-MCNC: 1.36 MG/DL (ref 0.5–1.3)
CREAT UR-MCNC: 134.3 MG/DL (ref 20–370)
EGFRCR SERPLBLD CKD-EPI 2021: 58 ML/MIN/1.73M*2
GLUCOSE SERPL-MCNC: 105 MG/DL (ref 74–99)
GLUCOSE UR STRIP.AUTO-MCNC: NORMAL MG/DL
HYALINE CASTS #/AREA URNS AUTO: ABNORMAL /LPF
KETONES UR STRIP.AUTO-MCNC: NEGATIVE MG/DL
LEUKOCYTE ESTERASE UR QL STRIP.AUTO: NEGATIVE
MICROALBUMIN UR-MCNC: 318.1 MG/L
MICROALBUMIN/CREAT UR: 236.9 UG/MG CREAT
MUCOUS THREADS #/AREA URNS AUTO: ABNORMAL /LPF
NITRITE UR QL STRIP.AUTO: NEGATIVE
PH UR STRIP.AUTO: 5.5 [PH]
PHOSPHATE SERPL-MCNC: 3.5 MG/DL (ref 2.5–4.9)
POTASSIUM SERPL-SCNC: 4.9 MMOL/L (ref 3.5–5.3)
PROT UR STRIP.AUTO-MCNC: ABNORMAL MG/DL
RBC # UR STRIP.AUTO: ABNORMAL /UL
RBC #/AREA URNS AUTO: >20 /HPF
SODIUM SERPL-SCNC: 138 MMOL/L (ref 136–145)
SP GR UR STRIP.AUTO: 1.01
UROBILINOGEN UR STRIP.AUTO-MCNC: NORMAL MG/DL
WBC #/AREA URNS AUTO: ABNORMAL /HPF

## 2024-10-10 PROCEDURE — 82043 UR ALBUMIN QUANTITATIVE: CPT

## 2024-10-10 PROCEDURE — 81001 URINALYSIS AUTO W/SCOPE: CPT

## 2024-10-10 PROCEDURE — 80069 RENAL FUNCTION PANEL: CPT

## 2024-10-10 PROCEDURE — 36415 COLL VENOUS BLD VENIPUNCTURE: CPT

## 2024-10-10 PROCEDURE — 82570 ASSAY OF URINE CREATININE: CPT

## 2024-10-10 PROCEDURE — 83970 ASSAY OF PARATHORMONE: CPT

## 2024-10-10 PROCEDURE — 82306 VITAMIN D 25 HYDROXY: CPT

## 2024-10-11 LAB — PTH-INTACT SERPL-MCNC: 46.5 PG/ML (ref 18.5–88)

## 2024-10-22 ENCOUNTER — PATIENT OUTREACH (OUTPATIENT)
Dept: PRIMARY CARE | Facility: CLINIC | Age: 64
End: 2024-10-22
Payer: COMMERCIAL

## 2024-10-22 NOTE — PROGRESS NOTES
Call regarding appt. with PCP on (10/3/24) after hospitalization.  At time of outreach call the patient feels as if their condition has (improved) since last visit.  Reviewed the PCP appointment with the pt and addressed any questions or concerns.    Patient states he has also seen nephrologist and surgeon and is everything seems to be healing well.

## 2024-11-14 ENCOUNTER — PATIENT OUTREACH (OUTPATIENT)
Dept: PRIMARY CARE | Facility: CLINIC | Age: 64
End: 2024-11-14
Payer: COMMERCIAL

## 2024-12-27 ENCOUNTER — PATIENT OUTREACH (OUTPATIENT)
Dept: PRIMARY CARE | Facility: CLINIC | Age: 64
End: 2024-12-27
Payer: COMMERCIAL

## 2024-12-27 NOTE — PROGRESS NOTES
Final call. Called and spoke with patient to address any questions or concerns regarding hospitalization.   Patient reports their condition has (improved).    Patient has met target of no readmission for (90) days post discharge and is graduated from Transitional Care Management program at this time.

## 2025-01-03 ENCOUNTER — APPOINTMENT (OUTPATIENT)
Dept: PRIMARY CARE | Facility: CLINIC | Age: 65
End: 2025-01-03
Payer: COMMERCIAL

## 2025-01-09 ENCOUNTER — LAB (OUTPATIENT)
Dept: LAB | Facility: LAB | Age: 65
End: 2025-01-09
Payer: COMMERCIAL

## 2025-01-09 DIAGNOSIS — N18.31 CHRONIC KIDNEY DISEASE, STAGE 3A (MULTI): Primary | ICD-10-CM

## 2025-01-09 LAB
ALBUMIN SERPL BCP-MCNC: 4 G/DL (ref 3.4–5)
ANION GAP SERPL CALC-SCNC: 14 MMOL/L (ref 10–20)
APPEARANCE UR: CLEAR
BACTERIA #/AREA URNS AUTO: ABNORMAL /HPF
BASOPHILS # BLD AUTO: 0.04 X10*3/UL (ref 0–0.1)
BASOPHILS NFR BLD AUTO: 0.6 %
BILIRUB UR STRIP.AUTO-MCNC: NEGATIVE MG/DL
BUN SERPL-MCNC: 44 MG/DL (ref 6–23)
CALCIUM SERPL-MCNC: 9 MG/DL (ref 8.6–10.3)
CHLORIDE SERPL-SCNC: 103 MMOL/L (ref 98–107)
CO2 SERPL-SCNC: 23 MMOL/L (ref 21–32)
COLOR UR: ABNORMAL
CREAT SERPL-MCNC: 1.48 MG/DL (ref 0.5–1.3)
EGFRCR SERPLBLD CKD-EPI 2021: 53 ML/MIN/1.73M*2
EOSINOPHIL # BLD AUTO: 0.12 X10*3/UL (ref 0–0.7)
EOSINOPHIL NFR BLD AUTO: 1.7 %
ERYTHROCYTE [DISTWIDTH] IN BLOOD BY AUTOMATED COUNT: 12.5 % (ref 11.5–14.5)
GLUCOSE SERPL-MCNC: 83 MG/DL (ref 74–99)
GLUCOSE UR STRIP.AUTO-MCNC: ABNORMAL MG/DL
HCT VFR BLD AUTO: 35.5 % (ref 41–52)
HGB BLD-MCNC: 11.6 G/DL (ref 13.5–17.5)
HOLD SPECIMEN: NORMAL
IMM GRANULOCYTES # BLD AUTO: 0.03 X10*3/UL (ref 0–0.7)
IMM GRANULOCYTES NFR BLD AUTO: 0.4 % (ref 0–0.9)
KETONES UR STRIP.AUTO-MCNC: NEGATIVE MG/DL
LEUKOCYTE ESTERASE UR QL STRIP.AUTO: NEGATIVE
LYMPHOCYTES # BLD AUTO: 1.66 X10*3/UL (ref 1.2–4.8)
LYMPHOCYTES NFR BLD AUTO: 23.1 %
MAGNESIUM SERPL-MCNC: 2.42 MG/DL (ref 1.6–2.4)
MCH RBC QN AUTO: 30.4 PG (ref 26–34)
MCHC RBC AUTO-ENTMCNC: 32.7 G/DL (ref 32–36)
MCV RBC AUTO: 93 FL (ref 80–100)
MONOCYTES # BLD AUTO: 0.62 X10*3/UL (ref 0.1–1)
MONOCYTES NFR BLD AUTO: 8.6 %
MUCOUS THREADS #/AREA URNS AUTO: ABNORMAL /LPF
NEUTROPHILS # BLD AUTO: 4.73 X10*3/UL (ref 1.2–7.7)
NEUTROPHILS NFR BLD AUTO: 65.6 %
NITRITE UR QL STRIP.AUTO: NEGATIVE
NRBC BLD-RTO: 0 /100 WBCS (ref 0–0)
PH UR STRIP.AUTO: 5 [PH]
PHOSPHATE SERPL-MCNC: 3.7 MG/DL (ref 2.5–4.9)
PLATELET # BLD AUTO: 195 X10*3/UL (ref 150–450)
POTASSIUM SERPL-SCNC: 4.6 MMOL/L (ref 3.5–5.3)
PROT UR STRIP.AUTO-MCNC: ABNORMAL MG/DL
PTH-INTACT SERPL-MCNC: 44.9 PG/ML (ref 18.5–88)
RBC # BLD AUTO: 3.82 X10*6/UL (ref 4.5–5.9)
RBC # UR STRIP.AUTO: ABNORMAL /UL
RBC #/AREA URNS AUTO: ABNORMAL /HPF
SODIUM SERPL-SCNC: 135 MMOL/L (ref 136–145)
SP GR UR STRIP.AUTO: 1.01
UROBILINOGEN UR STRIP.AUTO-MCNC: NORMAL MG/DL
WBC # BLD AUTO: 7.2 X10*3/UL (ref 4.4–11.3)
WBC #/AREA URNS AUTO: ABNORMAL /HPF

## 2025-01-09 PROCEDURE — 85025 COMPLETE CBC W/AUTO DIFF WBC: CPT

## 2025-01-09 PROCEDURE — 83970 ASSAY OF PARATHORMONE: CPT

## 2025-01-09 PROCEDURE — 81001 URINALYSIS AUTO W/SCOPE: CPT

## 2025-01-09 PROCEDURE — 83735 ASSAY OF MAGNESIUM: CPT

## 2025-01-09 PROCEDURE — 80069 RENAL FUNCTION PANEL: CPT

## 2025-01-16 ENCOUNTER — HOSPITAL ENCOUNTER (OUTPATIENT)
Dept: GASTROENTEROLOGY | Facility: HOSPITAL | Age: 65
Discharge: HOME | End: 2025-01-16
Payer: COMMERCIAL

## 2025-01-16 ENCOUNTER — ANESTHESIA EVENT (OUTPATIENT)
Dept: GASTROENTEROLOGY | Facility: HOSPITAL | Age: 65
End: 2025-01-16
Payer: COMMERCIAL

## 2025-01-16 ENCOUNTER — ANESTHESIA (OUTPATIENT)
Dept: GASTROENTEROLOGY | Facility: HOSPITAL | Age: 65
End: 2025-01-16
Payer: COMMERCIAL

## 2025-01-16 VITALS
HEIGHT: 68 IN | RESPIRATION RATE: 14 BRPM | DIASTOLIC BLOOD PRESSURE: 78 MMHG | SYSTOLIC BLOOD PRESSURE: 145 MMHG | WEIGHT: 179 LBS | TEMPERATURE: 97.7 F | BODY MASS INDEX: 27.13 KG/M2 | OXYGEN SATURATION: 97 % | HEART RATE: 79 BPM

## 2025-01-16 DIAGNOSIS — Z90.49 STATUS POST APPENDECTOMY: ICD-10-CM

## 2025-01-16 PROCEDURE — 2500000004 HC RX 250 GENERAL PHARMACY W/ HCPCS (ALT 636 FOR OP/ED): Performed by: NURSE ANESTHETIST, CERTIFIED REGISTERED

## 2025-01-16 PROCEDURE — 7100000010 HC PHASE TWO TIME - EACH INCREMENTAL 1 MINUTE

## 2025-01-16 PROCEDURE — 45378 DIAGNOSTIC COLONOSCOPY: CPT | Performed by: SURGERY

## 2025-01-16 PROCEDURE — 7100000009 HC PHASE TWO TIME - INITIAL BASE CHARGE

## 2025-01-16 PROCEDURE — 3700000001 HC GENERAL ANESTHESIA TIME - INITIAL BASE CHARGE

## 2025-01-16 PROCEDURE — 3700000002 HC GENERAL ANESTHESIA TIME - EACH INCREMENTAL 1 MINUTE

## 2025-01-16 RX ORDER — LOSARTAN POTASSIUM 50 MG/1
TABLET ORAL
COMMUNITY
Start: 2025-01-14

## 2025-01-16 RX ORDER — CHLORTHALIDONE 25 MG/1
TABLET ORAL DAILY
COMMUNITY

## 2025-01-16 RX ORDER — LIDOCAINE HYDROCHLORIDE 20 MG/ML
INJECTION, SOLUTION EPIDURAL; INFILTRATION; INTRACAUDAL; PERINEURAL AS NEEDED
Status: DISCONTINUED | OUTPATIENT
Start: 2025-01-16 | End: 2025-01-16

## 2025-01-16 RX ORDER — PROPOFOL 10 MG/ML
INJECTION, EMULSION INTRAVENOUS AS NEEDED
Status: DISCONTINUED | OUTPATIENT
Start: 2025-01-16 | End: 2025-01-16

## 2025-01-16 RX ORDER — EMPAGLIFLOZIN 10 MG/1
1 TABLET, FILM COATED ORAL
COMMUNITY
Start: 2024-11-27

## 2025-01-16 RX ORDER — SODIUM CHLORIDE 0.9 % (FLUSH) 0.9 %
SYRINGE (ML) INJECTION AS NEEDED
Status: DISCONTINUED | OUTPATIENT
Start: 2025-01-16 | End: 2025-01-16

## 2025-01-16 RX ADMIN — LIDOCAINE HYDROCHLORIDE 50 MG: 20 INJECTION, SOLUTION EPIDURAL; INFILTRATION; INTRACAUDAL; PERINEURAL at 12:47

## 2025-01-16 RX ADMIN — PROPOFOL 100 MG: 10 INJECTION, EMULSION INTRAVENOUS at 12:47

## 2025-01-16 RX ADMIN — PROPOFOL 20 MG: 10 INJECTION, EMULSION INTRAVENOUS at 13:01

## 2025-01-16 RX ADMIN — PROPOFOL 20 MG: 10 INJECTION, EMULSION INTRAVENOUS at 12:58

## 2025-01-16 RX ADMIN — PROPOFOL 20 MG: 10 INJECTION, EMULSION INTRAVENOUS at 12:55

## 2025-01-16 RX ADMIN — PROPOFOL 20 MG: 10 INJECTION, EMULSION INTRAVENOUS at 12:50

## 2025-01-16 RX ADMIN — PROPOFOL 20 MG: 10 INJECTION, EMULSION INTRAVENOUS at 12:52

## 2025-01-16 RX ADMIN — Medication 10 ML: at 12:52

## 2025-01-16 RX ADMIN — Medication 10 ML: at 13:03

## 2025-01-16 SDOH — HEALTH STABILITY: MENTAL HEALTH: CURRENT SMOKER: 0

## 2025-01-16 ASSESSMENT — PAIN - FUNCTIONAL ASSESSMENT
PAIN_FUNCTIONAL_ASSESSMENT: 0-10

## 2025-01-16 ASSESSMENT — PAIN SCALES - GENERAL
PAINLEVEL_OUTOF10: 0 - NO PAIN
PAIN_LEVEL: 0
PAINLEVEL_OUTOF10: 0 - NO PAIN
PAINLEVEL_OUTOF10: 6

## 2025-01-16 ASSESSMENT — COLUMBIA-SUICIDE SEVERITY RATING SCALE - C-SSRS
1. IN THE PAST MONTH, HAVE YOU WISHED YOU WERE DEAD OR WISHED YOU COULD GO TO SLEEP AND NOT WAKE UP?: NO
2. HAVE YOU ACTUALLY HAD ANY THOUGHTS OF KILLING YOURSELF?: NO
6. HAVE YOU EVER DONE ANYTHING, STARTED TO DO ANYTHING, OR PREPARED TO DO ANYTHING TO END YOUR LIFE?: NO

## 2025-01-16 NOTE — ANESTHESIA POSTPROCEDURE EVALUATION
Patient: Gilberto White    Procedure Summary       Date: 01/16/25 Room / Location: [facility]    Anesthesia Start: 1244 Anesthesia Stop: 1311    Procedure: COLONOSCOPY Diagnosis: Status post appendectomy    Scheduled Providers: Harsha Davis MD Responsible Provider: TIMOTHY Ayon    Anesthesia Type: MAC ASA Status: 3            Anesthesia Type: MAC    Vitals Value Taken Time   BP 93/57 01/16/25 1306   Temp 36.7 °C (98.1 °F) 01/16/25 1306   Pulse 71 01/16/25 1306   Resp 15 01/16/25 1306   SpO2 97 % 01/16/25 1306       Anesthesia Post Evaluation    Patient location during evaluation: PACU  Patient participation: complete - patient participated  Level of consciousness: awake and alert  Pain score: 0  Pain management: adequate  Airway patency: patent  Cardiovascular status: acceptable, hemodynamically stable and hypotensive  Respiratory status: acceptable, spontaneous ventilation and room air  Hydration status: acceptable  Postoperative Nausea and Vomiting: none        There were no known notable events for this encounter.

## 2025-01-16 NOTE — ANESTHESIA PREPROCEDURE EVALUATION
Patient: Gilberto White    Procedure Information       Date/Time: 01/16/25 1215    Scheduled providers: Harsha Davis MD    Procedure: COLONOSCOPY    Location:  [facility]            Relevant Problems   Anesthesia (within normal limits)      Cardiac   (+) Bilateral renal artery stenosis (CMS-HCC)   (+) Essential hypertension   (+) Hyperlipidemia LDL goal <100   (+) Renal artery stenosis (CMS-HCC)      Endocrine   (+) Type 2 diabetes mellitus with chronic kidney disease, without long-term current use of insulin (Multi)      Hematology   (+) Anemia       Clinical information reviewed:   Tobacco  Allergies  Meds   Med Hx  Surg Hx   Fam Hx  Soc Hx        NPO Detail:  NPO/Void Status  Date of Last Liquid: 01/15/25  Time of Last Liquid: 2000  Date of Last Solid: 01/14/25  Last Intake Type: Clear fluids         Physical Exam    Airway  Mallampati: II  TM distance: >3 FB  Neck ROM: full     Cardiovascular - normal exam     Dental - normal exam     Pulmonary - normal exam     Abdominal - normal exam             Anesthesia Plan    History of general anesthesia?: yes  History of complications of general anesthesia?: no    ASA 3     MAC     The patient is not a current smoker.    intravenous induction   Anesthetic plan and risks discussed with patient.    Plan discussed with CRNA.

## 2025-01-16 NOTE — H&P
"History Of Present Illness  Gilberto White is a 64 y.o. male presenting with hx polyp, perf appy.     Past Medical History  He has a past medical history of Anemia (02/03/2023), Chronic arthralgias of knees and hips (02/03/2023), Essential hypertension (02/03/2023), Hyperbilirubinemia (02/03/2023), Hypertension, and Low serum vitamin D (02/03/2023).    Surgical History  He has a past surgical history that includes Other surgical history (11/24/2021) and Other surgical history (11/24/2021).     Social History  He reports that he has never smoked. He has never used smokeless tobacco. He reports that he does not drink alcohol and does not use drugs.    Family History  Family History   Problem Relation Name Age of Onset    Stroke Mother      Diabetes Mother      Breast cancer Mother      Diabetes type II Mother          with other skin complication    Diabetes Father      Myasthenia gravis Father      Diabetes type II Father      Stroke Sibling          Allergies  Patient has no known allergies.    Review of Systems   All other systems reviewed and are negative.       Physical Exam  Vitals reviewed.   Cardiovascular:      Rate and Rhythm: Normal rate and regular rhythm.   Pulmonary:      Breath sounds: Normal breath sounds.   Skin:     General: Skin is warm and dry.   Neurological:      Mental Status: He is alert.   Psychiatric:         Mood and Affect: Mood normal.          Last Recorded Vitals  Blood pressure 149/74, pulse 90, temperature 36.8 °C (98.3 °F), temperature source Temporal, resp. rate 16, height 1.727 m (5' 8\"), weight 81.2 kg (179 lb), SpO2 100%.    Relevant Results               Assessment/Plan   Assessment & Plan  Status post appendectomy      For colo       I spent 15 minutes in the professional and overall care of this patient.      Harsha Davis MD  "

## 2025-02-26 LAB
25(OH)D3+25(OH)D2 SERPL-MCNC: 54 NG/ML (ref 30–100)
ALBUMIN SERPL-MCNC: 4.3 G/DL (ref 3.6–5.1)
ALBUMIN/CREAT UR: 167 MG/G CREAT
ALP SERPL-CCNC: 79 U/L (ref 35–144)
ALT SERPL-CCNC: 23 U/L (ref 9–46)
ANION GAP SERPL CALCULATED.4IONS-SCNC: 12 MMOL/L (CALC) (ref 7–17)
AST SERPL-CCNC: 30 U/L (ref 10–35)
BASOPHILS # BLD AUTO: 42 CELLS/UL (ref 0–200)
BASOPHILS NFR BLD AUTO: 0.8 %
BILIRUB SERPL-MCNC: 1.5 MG/DL (ref 0.2–1.2)
BUN SERPL-MCNC: 58 MG/DL (ref 7–25)
CALCIUM SERPL-MCNC: 9.3 MG/DL (ref 8.6–10.3)
CHLORIDE SERPL-SCNC: 104 MMOL/L (ref 98–110)
CHOLEST SERPL-MCNC: 122 MG/DL
CHOLEST/HDLC SERPL: 2.3 (CALC)
CO2 SERPL-SCNC: 21 MMOL/L (ref 20–32)
CREAT SERPL-MCNC: 1.86 MG/DL (ref 0.7–1.35)
CREAT UR-MCNC: 118 MG/DL (ref 20–320)
EGFRCR SERPLBLD CKD-EPI 2021: 40 ML/MIN/1.73M2
EOSINOPHIL # BLD AUTO: 120 CELLS/UL (ref 15–500)
EOSINOPHIL NFR BLD AUTO: 2.3 %
ERYTHROCYTE [DISTWIDTH] IN BLOOD BY AUTOMATED COUNT: 13.5 % (ref 11–15)
EST. AVERAGE GLUCOSE BLD GHB EST-MCNC: 148 MG/DL
EST. AVERAGE GLUCOSE BLD GHB EST-SCNC: 8.2 MMOL/L
GLUCOSE SERPL-MCNC: 75 MG/DL (ref 65–99)
HBA1C MFR BLD: 6.8 % OF TOTAL HGB
HCT VFR BLD AUTO: 33.2 % (ref 38.5–50)
HDLC SERPL-MCNC: 53 MG/DL
HGB BLD-MCNC: 11 G/DL (ref 13.2–17.1)
LDLC SERPL CALC-MCNC: 52 MG/DL (CALC)
LYMPHOCYTES # BLD AUTO: 1414 CELLS/UL (ref 850–3900)
LYMPHOCYTES NFR BLD AUTO: 27.2 %
MCH RBC QN AUTO: 31.6 PG (ref 27–33)
MCHC RBC AUTO-ENTMCNC: 33.1 G/DL (ref 32–36)
MCV RBC AUTO: 95.4 FL (ref 80–100)
MICROALBUMIN UR-MCNC: 19.7 MG/DL
MONOCYTES # BLD AUTO: 437 CELLS/UL (ref 200–950)
MONOCYTES NFR BLD AUTO: 8.4 %
NEUTROPHILS # BLD AUTO: 3188 CELLS/UL (ref 1500–7800)
NEUTROPHILS NFR BLD AUTO: 61.3 %
NONHDLC SERPL-MCNC: 69 MG/DL (CALC)
PLATELET # BLD AUTO: 177 THOUSAND/UL (ref 140–400)
PMV BLD REES-ECKER: 10.8 FL (ref 7.5–12.5)
POTASSIUM SERPL-SCNC: 4.9 MMOL/L (ref 3.5–5.3)
PROT SERPL-MCNC: 7.1 G/DL (ref 6.1–8.1)
RBC # BLD AUTO: 3.48 MILLION/UL (ref 4.2–5.8)
SODIUM SERPL-SCNC: 137 MMOL/L (ref 135–146)
TRIGL SERPL-MCNC: 83 MG/DL
WBC # BLD AUTO: 5.2 THOUSAND/UL (ref 3.8–10.8)

## 2025-03-04 ENCOUNTER — APPOINTMENT (OUTPATIENT)
Dept: PRIMARY CARE | Facility: CLINIC | Age: 65
End: 2025-03-04
Payer: COMMERCIAL

## 2025-03-04 VITALS
HEART RATE: 83 BPM | WEIGHT: 180.6 LBS | SYSTOLIC BLOOD PRESSURE: 170 MMHG | RESPIRATION RATE: 16 BRPM | HEIGHT: 68 IN | OXYGEN SATURATION: 98 % | DIASTOLIC BLOOD PRESSURE: 69 MMHG | BODY MASS INDEX: 27.37 KG/M2 | TEMPERATURE: 98.2 F

## 2025-03-04 DIAGNOSIS — I10 ESSENTIAL HYPERTENSION: ICD-10-CM

## 2025-03-04 DIAGNOSIS — K35.33 ACUTE APPENDICITIS WITH APPENDICEAL ABSCESS: ICD-10-CM

## 2025-03-04 DIAGNOSIS — I70.1 BILATERAL RENAL ARTERY STENOSIS (CMS-HCC): Primary | ICD-10-CM

## 2025-03-04 DIAGNOSIS — E11.22 TYPE 2 DIABETES MELLITUS WITH STAGE 3A CHRONIC KIDNEY DISEASE, WITHOUT LONG-TERM CURRENT USE OF INSULIN (MULTI): ICD-10-CM

## 2025-03-04 DIAGNOSIS — E80.6 HYPERBILIRUBINEMIA: ICD-10-CM

## 2025-03-04 DIAGNOSIS — N18.31 TYPE 2 DIABETES MELLITUS WITH STAGE 3A CHRONIC KIDNEY DISEASE, WITHOUT LONG-TERM CURRENT USE OF INSULIN (MULTI): ICD-10-CM

## 2025-03-04 DIAGNOSIS — N18.31 STAGE 3A CHRONIC KIDNEY DISEASE (MULTI): ICD-10-CM

## 2025-03-04 DIAGNOSIS — E78.5 HYPERLIPIDEMIA LDL GOAL <100: ICD-10-CM

## 2025-03-04 PROCEDURE — 99214 OFFICE O/P EST MOD 30 MIN: CPT | Performed by: INTERNAL MEDICINE

## 2025-03-04 PROCEDURE — 3078F DIAST BP <80 MM HG: CPT | Performed by: INTERNAL MEDICINE

## 2025-03-04 PROCEDURE — 3008F BODY MASS INDEX DOCD: CPT | Performed by: INTERNAL MEDICINE

## 2025-03-04 PROCEDURE — 4010F ACE/ARB THERAPY RXD/TAKEN: CPT | Performed by: INTERNAL MEDICINE

## 2025-03-04 PROCEDURE — 3077F SYST BP >= 140 MM HG: CPT | Performed by: INTERNAL MEDICINE

## 2025-03-04 PROCEDURE — 1036F TOBACCO NON-USER: CPT | Performed by: INTERNAL MEDICINE

## 2025-03-04 SDOH — ECONOMIC STABILITY: FOOD INSECURITY: WITHIN THE PAST 12 MONTHS, THE FOOD YOU BOUGHT JUST DIDN'T LAST AND YOU DIDN'T HAVE MONEY TO GET MORE.: NEVER TRUE

## 2025-03-04 SDOH — ECONOMIC STABILITY: FOOD INSECURITY: WITHIN THE PAST 12 MONTHS, YOU WORRIED THAT YOUR FOOD WOULD RUN OUT BEFORE YOU GOT MONEY TO BUY MORE.: NEVER TRUE

## 2025-03-04 ASSESSMENT — LIFESTYLE VARIABLES
AUDIT-C TOTAL SCORE: 0
SKIP TO QUESTIONS 9-10: 1
HOW OFTEN DO YOU HAVE A DRINK CONTAINING ALCOHOL: NEVER
HOW MANY STANDARD DRINKS CONTAINING ALCOHOL DO YOU HAVE ON A TYPICAL DAY: PATIENT DOES NOT DRINK
HOW OFTEN DO YOU HAVE SIX OR MORE DRINKS ON ONE OCCASION: NEVER

## 2025-03-04 NOTE — ASSESSMENT & PLAN NOTE
Patient takes spironolactone, amlodipine, losartan, Jardiance, metoprolol. Will follow up with nephrology

## 2025-03-04 NOTE — ASSESSMENT & PLAN NOTE
Patient has a history of hyperbilirubinemia, recheck labs and direct bilirubin in the future. Appendix was noted by the patient to be proximal to the liver

## 2025-03-04 NOTE — PROGRESS NOTES
Chief Complaint  Patient ID: Gilberto White is a 64 y.o. male who presents for Follow-up (No new health concerns).    Past Medical, Surgical, and Family History reviewed and updated in chart.    Reviewed all medications by prescribing practitioner or clinical pharmacist (such as prescriptions, OTCs, herbal therapies and supplements) and documented in the medical record.    History of Present Illness    Follow up:    Colonoscopy: 1/16/2025, repeat in one year. Sessile serrated adenoma per path report.     HLD: Currently on Rosuvastatin 10 mg daily. Total cholesterol 122, HDL 53, TRI 83, LDL 52      HTN:  Smart blood pressure monitoring Amlodipine, Chlorthalidone, Metoprolol and Losartan. Following with nephrology appointment coming up.    Patient now sees Dr Fink for nephrology. Renal artery US (3/2023) revealed bilateral renal artery stenosis at 60%, the patient apparently did not have significant renal artery stenosis on cath procedure. He did not require stenting. Patient no longer gets lightheaded at times, especially when standing.     CKD: Creatinine 1.86 EGF 40 and BUN 58, albuminuria present, seeing nephrology. Patient had elevated BP when he was seen by nephrology    Elevated T bili: 1.5, continue to monitor     Chronic anemia: Hb 11.0 MCV 95.4      DM type 2: He takes Jardiance now. Patient walks 4-5 miles per. Patient has a history of retinal swelling. Recent A1C 6.8.     Cataracts: these are stable, no changes.      Vitamin D deficiency: Takes vitamin D daily Level  WNL     Ruptured appendix: patient developed an acute appendicitis with appendiceal abscess.  He was seen by Dr Davis. Patient also had a colonoscopy completed.  He will follow up with Dr Davis in a year    Review of Systems  All pertinent positive symptoms are included in the history of present illness.    All other systems have been reviewed and are negative and noncontributory to this patient's current ailments.    Past Medical  History  He has a past medical history of Anemia (02/03/2023), Chronic arthralgias of knees and hips (02/03/2023), Essential hypertension (02/03/2023), Hyperbilirubinemia (02/03/2023), Hypertension, and Low serum vitamin D (02/03/2023).    Surgical History  He has a past surgical history that includes Other surgical history (11/24/2021); Other surgical history (11/24/2021); and Appendectomy (09/22/2024).     Social History  He reports that he has never smoked. He has never used smokeless tobacco. He reports that he does not drink alcohol and does not use drugs.    Family History   Problem Relation Name Age of Onset    Stroke Mother      Diabetes Mother      Breast cancer Mother      Diabetes type II Mother          with other skin complication    Diabetes Father      Myasthenia gravis Father      Diabetes type II Father      Stroke Sibling       Outpatient Medications Prior to Visit   Medication Sig Dispense Refill    aflibercept (Eylea) 2 mg/0.05 mL intra-ocular injection 0.05 mL (2 mg) by intravitreal route every 30 (thirty) days.      amLODIPine (Norvasc) 5 mg tablet TAKE 1 TABLET BY MOUTH EVERY DAY 90 tablet 1    chlorthalidone (Hygroton) 25 mg tablet Take by mouth once daily.      cholecalciferol (Vitamin D-3) 125 MCG (5000 UT) capsule Take 1 capsule (125 mcg) by mouth once daily.      Jardiance 10 mg Take 1 tablet (10 mg) by mouth early in the morning..      losartan (Cozaar) 50 mg tablet       metoprolol succinate XL (Toprol-XL) 25 mg 24 hr tablet Take 1 tablet (25 mg) by mouth once daily.      multivit-minerals/FA/lycopene (ONE DAILY FOR MEN ORAL) Take 1 tablet by mouth once daily.      rosuvastatin (Crestor) 10 mg tablet Take 1 tablet (10 mg) by mouth once daily. 90 tablet 3    acetaminophen (Tylenol) 325 mg tablet Take 2 tablets (650 mg) by mouth every 4 hours if needed for mild pain (1 - 3), moderate pain (4 - 6), headaches or fever (temp greater than 38.0 C). (Patient not taking: Reported on 3/4/2025) 30  "tablet 0    ciprofloxacin (Cipro) 500 mg tablet Take 1 tablet (500 mg) by mouth once every 24 hours. Do not fill before September 29, 2024. (Patient not taking: Reported on 3/4/2025) 4 tablet 0    furosemide (Lasix) 20 mg tablet Take 1 tablet (20 mg) by mouth once daily. (Patient not taking: Reported on 3/4/2025) 5 tablet 0    metroNIDAZOLE (Flagyl) 500 mg tablet Take 1 tablet (500 mg) by mouth every 8 hours. (Patient not taking: Reported on 3/4/2025) 6 tablet 0    potassium chloride CR (Klor-Con) 10 mEq ER tablet Take one tablet every other day for 3 doses total. Do not crush, chew, or split. (Patient not taking: Reported on 3/4/2025) 3 tablet 0     No facility-administered medications prior to visit.     Allergies  Patient has no known allergies.    Immunization History   Administered Date(s) Administered    Flu vaccine, quadrivalent, no egg protein, age 6 month or greater (FLUCELVAX) 10/14/2017, 09/28/2019, 10/16/2021, 10/14/2022, 11/09/2023    Hep B, Unspecified 12/08/1994, 01/12/1995, 06/01/1995    Hepatitis B vaccine, adult *Check Product/Dose* 12/08/1994, 01/12/1995, 06/01/1995    Influenza, injectable, quadrivalent 11/05/2015    Influenza, seasonal, injectable 10/14/2022    Pfizer COVID-19 vaccine, 12 years and older, (30mcg/0.3mL) (Comirnaty) 10/28/2024    Pfizer COVID-19 vaccine, bivalent, age 12 years and older (30 mcg/0.3 mL) 10/14/2022    Pfizer Purple Cap SARS-CoV-2 02/26/2021, 03/19/2021, 12/08/2021, 10/14/2022, 12/21/2023    Pneumococcal polysaccharide vaccine, 23-valent, age 2 years and older (PNEUMOVAX 23) 11/24/2021    RSV, 60 Years And Older (AREXVY) 12/06/2023    Zoster vaccine, recombinant, adult (SHINGRIX) 10/18/2020, 12/31/2020     Objective   Visit Vitals  /69 (BP Location: Left arm, Patient Position: Sitting, BP Cuff Size: Adult)   Pulse 83   Temp 36.8 °C (98.2 °F) (Temporal)   Resp 16   Ht 1.727 m (5' 8\")   Wt 81.9 kg (180 lb 9.6 oz)   SpO2 98%   BMI 27.46 kg/m²   Smoking Status " Never   BSA 1.98 m²        BP Readings from Last 3 Encounters:   03/04/25 170/69   01/16/25 145/78   10/03/24 144/76      Wt Readings from Last 3 Encounters:   03/04/25 81.9 kg (180 lb 9.6 oz)   01/16/25 81.2 kg (179 lb)   10/03/24 81.2 kg (179 lb 1.6 oz)      Vision  No results found.    Relevant Results  Orders Only on 02/25/2025   Component Date Value    CHOLESTEROL, TOTAL 02/25/2025 122     HDL CHOLESTEROL 02/25/2025 53     TRIGLYCERIDES 02/25/2025 83     LDL-CHOLESTEROL 02/25/2025 52     CHOL/HDLC RATIO 02/25/2025 2.3     NON HDL CHOLESTEROL 02/25/2025 69     GLUCOSE 02/25/2025 75     UREA NITROGEN (BUN) 02/25/2025 58 (H)     CREATININE 02/25/2025 1.86 (H)     EGFR 02/25/2025 40 (L)     SODIUM 02/25/2025 137     POTASSIUM 02/25/2025 4.9     CHLORIDE 02/25/2025 104     CARBON DIOXIDE 02/25/2025 21     ELECTROLYTE BALANCE 02/25/2025 12     CALCIUM 02/25/2025 9.3     PROTEIN, TOTAL 02/25/2025 7.1     ALBUMIN 02/25/2025 4.3     BILIRUBIN, TOTAL 02/25/2025 1.5 (H)     ALKALINE PHOSPHATASE 02/25/2025 79     AST 02/25/2025 30     ALT 02/25/2025 23     CREATININE, RANDOM URINE 02/25/2025 118     ALBUMIN, URINE 02/25/2025 19.7     ALBUMIN/CREATININE RATIO* 02/25/2025 167 (H)     WHITE BLOOD CELL COUNT 02/25/2025 5.2     RED BLOOD CELL COUNT 02/25/2025 3.48 (L)     HEMOGLOBIN 02/25/2025 11.0 (L)     HEMATOCRIT 02/25/2025 33.2 (L)     MCV 02/25/2025 95.4     MCH 02/25/2025 31.6     MCHC 02/25/2025 33.1     RDW 02/25/2025 13.5     PLATELET COUNT 02/25/2025 177     MPV 02/25/2025 10.8     ABSOLUTE NEUTROPHILS 02/25/2025 3,188     ABSOLUTE LYMPHOCYTES 02/25/2025 1,414     ABSOLUTE MONOCYTES 02/25/2025 437     ABSOLUTE EOSINOPHILS 02/25/2025 120     ABSOLUTE BASOPHILS 02/25/2025 42     NEUTROPHILS 02/25/2025 61.3     LYMPHOCYTES 02/25/2025 27.2     MONOCYTES 02/25/2025 8.4     EOSINOPHILS 02/25/2025 2.3     BASOPHILS 02/25/2025 0.8     VITAMIN D,25-OH,TOTAL,IA 02/25/2025 54     HEMOGLOBIN A1c 02/25/2025 6.8 (H)     eAG  (mg/dL) 02/25/2025 148     eAG (mmol/L) 02/25/2025 8.2      The ASCVD Risk score (Savanna DK, et al., 2019) failed to calculate for the following reasons:    The valid total cholesterol range is 130 to 320 mg/dL    Physical Exam  Constitutional:       Appearance: Normal appearance. He is not ill-appearing.   HENT:      Head: Normocephalic and atraumatic.      Right Ear: External ear normal.      Left Ear: External ear normal.   Eyes:      Extraocular Movements: Extraocular movements intact. Wearing glasses     Conjunctiva/sclera: Conjunctivae normal.   Neck:      Vascular: Carotid bruit present.      Comments: Soft bilateral bruits are noted, unchanged  Cardiovascular:      Rate and Rhythm: Normal rate and regular rhythm.      Pulses: Normal pulses.      Heart sounds: Murmur heard.      Comments: A soft SM is noted over the LUSB, unchanged  Pulmonary:      Effort: Pulmonary effort is normal.      Breath sounds: Normal breath sounds.   Musculoskeletal:         General: Swelling (b/l below knee to ankle) present. No tenderness. Normal range of motion.      Cervical back: Normal range of motion and neck supple.      Right lower leg: Edema present, he has 1 + pitting ankle edema bilaterally. This is not new     Left lower leg: Edema present.   Skin:     General: Skin is warm and dry.     Neurological:      General: No focal deficit present.      Mental Status: He is alert and oriented to person, place, and time. Mental status is at baseline.      Motor: No weakness.   Psychiatric:         Mood and Affect: Mood normal.         Behavior: Behavior normal.         Thought Content: Thought content normal.         Judgment: Judgment normal.        Assessment and Plan  Problem List Items Addressed This Visit       Acute appendicitis with appendiceal abscess     Patient is stable, he will follow up with Dr Davis         Relevant Orders    CBC and Auto Differential    Comprehensive Metabolic Panel    Bilateral renal artery  stenosis (CMS-HCC) - Primary     Patient was noted to have 60% stenosis, not requiring stenting, patient sees nephrology, he will follow up with the nephrologist         Relevant Orders    Referral to Nutrition Services    CBC and Auto Differential    Comprehensive Metabolic Panel    CKD (chronic kidney disease)     Patient sees nephrology regularly         Relevant Orders    Referral to Nutrition Services    CBC and Auto Differential    Comprehensive Metabolic Panel    Vitamin D 25-Hydroxy,Total (for eval of Vitamin D levels)    Essential hypertension     Patient takes spironolactone, amlodipine, losartan, Jardiance, metoprolol. Will follow up with nephrology         Relevant Orders    Referral to Nutrition Services    Albumin-Creatinine Ratio, Urine Random    CBC and Auto Differential    Comprehensive Metabolic Panel    Hyperbilirubinemia     Patient has a history of hyperbilirubinemia, recheck labs and direct bilirubin in the future. Appendix was noted by the patient to be proximal to the liver         Relevant Orders    CBC and Auto Differential    Comprehensive Metabolic Panel    Bilirubin, direct    Hyperlipidemia LDL goal <100     LDL is controlled at present, continue to follow up, check labs, continue statin therapy         Relevant Orders    Referral to Nutrition Services    CBC and Auto Differential    Comprehensive Metabolic Panel    Lipid Panel    Type 2 diabetes mellitus with chronic kidney disease, without long-term current use of insulin (Multi)     Patient takes Jardiance , glucose is stable at present         Relevant Orders    Referral to Nutrition Services    Albumin-Creatinine Ratio, Urine Random    CBC and Auto Differential    Comprehensive Metabolic Panel    Hemoglobin A1C   Check labs in 3 months  Follow up after testing

## 2025-03-04 NOTE — ASSESSMENT & PLAN NOTE
Patient was noted to have 60% stenosis, not requiring stenting, patient sees nephrology, he will follow up with the nephrologist

## 2025-03-18 ENCOUNTER — HOSPITAL ENCOUNTER (OUTPATIENT)
Dept: RADIOLOGY | Facility: HOSPITAL | Age: 65
Discharge: HOME | End: 2025-03-18
Payer: COMMERCIAL

## 2025-03-18 DIAGNOSIS — N18.31 CHRONIC KIDNEY DISEASE, STAGE 3A (MULTI): ICD-10-CM

## 2025-03-18 PROCEDURE — 76770 US EXAM ABDO BACK WALL COMP: CPT

## 2025-05-04 DIAGNOSIS — K35.33 ACUTE APPENDICITIS WITH APPENDICEAL ABSCESS: ICD-10-CM

## 2025-05-04 DIAGNOSIS — I10 ESSENTIAL HYPERTENSION: ICD-10-CM

## 2025-05-04 DIAGNOSIS — E80.6 HYPERBILIRUBINEMIA: ICD-10-CM

## 2025-05-04 DIAGNOSIS — N18.31 STAGE 3A CHRONIC KIDNEY DISEASE (MULTI): ICD-10-CM

## 2025-05-04 DIAGNOSIS — N18.31 TYPE 2 DIABETES MELLITUS WITH STAGE 3A CHRONIC KIDNEY DISEASE, WITHOUT LONG-TERM CURRENT USE OF INSULIN (MULTI): ICD-10-CM

## 2025-05-04 DIAGNOSIS — E11.22 TYPE 2 DIABETES MELLITUS WITH STAGE 3A CHRONIC KIDNEY DISEASE, WITHOUT LONG-TERM CURRENT USE OF INSULIN (MULTI): ICD-10-CM

## 2025-05-04 DIAGNOSIS — I70.1 BILATERAL RENAL ARTERY STENOSIS: ICD-10-CM

## 2025-05-04 DIAGNOSIS — E78.5 HYPERLIPIDEMIA LDL GOAL <100: ICD-10-CM

## 2025-06-05 ENCOUNTER — APPOINTMENT (OUTPATIENT)
Dept: PRIMARY CARE | Facility: CLINIC | Age: 65
End: 2025-06-05
Payer: COMMERCIAL

## 2025-07-16 LAB
25(OH)D3+25(OH)D2 SERPL-MCNC: 59 NG/ML (ref 30–100)
ALBUMIN SERPL-MCNC: 4.1 G/DL (ref 3.6–5.1)
ALBUMIN/CREAT UR: 216 MG/G CREAT
ALP SERPL-CCNC: 91 U/L (ref 35–144)
ALT SERPL-CCNC: 16 U/L (ref 9–46)
ANION GAP SERPL CALCULATED.4IONS-SCNC: 12 MMOL/L (CALC) (ref 7–17)
AST SERPL-CCNC: 30 U/L (ref 10–35)
BASOPHILS # BLD AUTO: 62 CELLS/UL (ref 0–200)
BASOPHILS NFR BLD AUTO: 1.1 %
BILIRUB DIRECT SERPL-MCNC: 0.2 MG/DL
BILIRUB SERPL-MCNC: 0.9 MG/DL (ref 0.2–1.2)
BUN SERPL-MCNC: 41 MG/DL (ref 7–25)
CALCIUM SERPL-MCNC: 9 MG/DL (ref 8.6–10.3)
CHLORIDE SERPL-SCNC: 104 MMOL/L (ref 98–110)
CHOLEST SERPL-MCNC: 112 MG/DL
CHOLEST/HDLC SERPL: 2.5 (CALC)
CO2 SERPL-SCNC: 20 MMOL/L (ref 20–32)
CREAT SERPL-MCNC: 1.68 MG/DL (ref 0.7–1.35)
CREAT UR-MCNC: 50 MG/DL (ref 20–320)
EGFRCR SERPLBLD CKD-EPI 2021: 45 ML/MIN/1.73M2
EOSINOPHIL # BLD AUTO: 90 CELLS/UL (ref 15–500)
EOSINOPHIL NFR BLD AUTO: 1.6 %
ERYTHROCYTE [DISTWIDTH] IN BLOOD BY AUTOMATED COUNT: 14.3 % (ref 11–15)
EST. AVERAGE GLUCOSE BLD GHB EST-MCNC: 140 MG/DL
EST. AVERAGE GLUCOSE BLD GHB EST-SCNC: 7.7 MMOL/L
GLUCOSE SERPL-MCNC: 65 MG/DL (ref 65–99)
HBA1C MFR BLD: 6.5 %
HCT VFR BLD AUTO: 33.4 % (ref 38.5–50)
HDLC SERPL-MCNC: 45 MG/DL
HGB BLD-MCNC: 10.4 G/DL (ref 13.2–17.1)
LDLC SERPL CALC-MCNC: 49 MG/DL (CALC)
LYMPHOCYTES # BLD AUTO: 1075 CELLS/UL (ref 850–3900)
LYMPHOCYTES NFR BLD AUTO: 19.2 %
MCH RBC QN AUTO: 31.6 PG (ref 27–33)
MCHC RBC AUTO-ENTMCNC: 31.1 G/DL (ref 32–36)
MCV RBC AUTO: 101.5 FL (ref 80–100)
MICROALBUMIN UR-MCNC: 10.8 MG/DL
MONOCYTES # BLD AUTO: 510 CELLS/UL (ref 200–950)
MONOCYTES NFR BLD AUTO: 9.1 %
NEUTROPHILS # BLD AUTO: 3864 CELLS/UL (ref 1500–7800)
NEUTROPHILS NFR BLD AUTO: 69 %
NONHDLC SERPL-MCNC: 67 MG/DL (CALC)
PLATELET # BLD AUTO: 218 THOUSAND/UL (ref 140–400)
PMV BLD REES-ECKER: 10.7 FL (ref 7.5–12.5)
POTASSIUM SERPL-SCNC: 5.4 MMOL/L (ref 3.5–5.3)
PROT SERPL-MCNC: 7.2 G/DL (ref 6.1–8.1)
RBC # BLD AUTO: 3.29 MILLION/UL (ref 4.2–5.8)
SODIUM SERPL-SCNC: 136 MMOL/L (ref 135–146)
TRIGL SERPL-MCNC: 97 MG/DL
WBC # BLD AUTO: 5.6 THOUSAND/UL (ref 3.8–10.8)

## 2025-07-17 DIAGNOSIS — N20.0 BILATERAL KIDNEY STONES: Primary | ICD-10-CM

## 2025-07-22 ENCOUNTER — APPOINTMENT (OUTPATIENT)
Dept: PRIMARY CARE | Facility: CLINIC | Age: 65
End: 2025-07-22
Payer: COMMERCIAL

## 2025-07-22 VITALS
OXYGEN SATURATION: 99 % | HEART RATE: 78 BPM | RESPIRATION RATE: 18 BRPM | DIASTOLIC BLOOD PRESSURE: 73 MMHG | WEIGHT: 192.9 LBS | HEIGHT: 68 IN | SYSTOLIC BLOOD PRESSURE: 164 MMHG | TEMPERATURE: 97.9 F | BODY MASS INDEX: 29.24 KG/M2

## 2025-07-22 DIAGNOSIS — N18.31 STAGE 3A CHRONIC KIDNEY DISEASE (MULTI): ICD-10-CM

## 2025-07-22 DIAGNOSIS — N18.31 TYPE 2 DIABETES MELLITUS WITH STAGE 3A CHRONIC KIDNEY DISEASE, WITHOUT LONG-TERM CURRENT USE OF INSULIN (MULTI): ICD-10-CM

## 2025-07-22 DIAGNOSIS — E78.5 HYPERLIPIDEMIA LDL GOAL <100: ICD-10-CM

## 2025-07-22 DIAGNOSIS — I10 ESSENTIAL HYPERTENSION: Primary | ICD-10-CM

## 2025-07-22 DIAGNOSIS — D64.9 ANEMIA, UNSPECIFIED TYPE: ICD-10-CM

## 2025-07-22 DIAGNOSIS — E11.22 TYPE 2 DIABETES MELLITUS WITH STAGE 3A CHRONIC KIDNEY DISEASE, WITHOUT LONG-TERM CURRENT USE OF INSULIN (MULTI): ICD-10-CM

## 2025-07-22 PROCEDURE — 99214 OFFICE O/P EST MOD 30 MIN: CPT | Performed by: INTERNAL MEDICINE

## 2025-07-22 PROCEDURE — 3077F SYST BP >= 140 MM HG: CPT | Performed by: INTERNAL MEDICINE

## 2025-07-22 PROCEDURE — 1036F TOBACCO NON-USER: CPT | Performed by: INTERNAL MEDICINE

## 2025-07-22 PROCEDURE — 3078F DIAST BP <80 MM HG: CPT | Performed by: INTERNAL MEDICINE

## 2025-07-22 PROCEDURE — 3008F BODY MASS INDEX DOCD: CPT | Performed by: INTERNAL MEDICINE

## 2025-07-22 PROCEDURE — 4010F ACE/ARB THERAPY RXD/TAKEN: CPT | Performed by: INTERNAL MEDICINE

## 2025-07-22 RX ORDER — ROSUVASTATIN CALCIUM 10 MG/1
10 TABLET, COATED ORAL DAILY
Qty: 90 TABLET | Refills: 3 | Status: SHIPPED | OUTPATIENT
Start: 2025-07-22 | End: 2026-08-26

## 2025-07-22 RX ORDER — TELMISARTAN 20 MG/1
1 TABLET ORAL
COMMUNITY
Start: 2025-06-28

## 2025-07-22 SDOH — ECONOMIC STABILITY: FOOD INSECURITY: WITHIN THE PAST 12 MONTHS, THE FOOD YOU BOUGHT JUST DIDN'T LAST AND YOU DIDN'T HAVE MONEY TO GET MORE.: NEVER TRUE

## 2025-07-22 SDOH — ECONOMIC STABILITY: FOOD INSECURITY: WITHIN THE PAST 12 MONTHS, YOU WORRIED THAT YOUR FOOD WOULD RUN OUT BEFORE YOU GOT MONEY TO BUY MORE.: NEVER TRUE

## 2025-07-22 ASSESSMENT — LIFESTYLE VARIABLES
AUDIT-C TOTAL SCORE: 0
HOW OFTEN DO YOU HAVE A DRINK CONTAINING ALCOHOL: NEVER
HOW OFTEN DO YOU HAVE SIX OR MORE DRINKS ON ONE OCCASION: NEVER
HOW MANY STANDARD DRINKS CONTAINING ALCOHOL DO YOU HAVE ON A TYPICAL DAY: PATIENT DOES NOT DRINK
SKIP TO QUESTIONS 9-10: 1

## 2025-07-22 ASSESSMENT — PATIENT HEALTH QUESTIONNAIRE - PHQ9
SUM OF ALL RESPONSES TO PHQ9 QUESTIONS 1 & 2: 0
1. LITTLE INTEREST OR PLEASURE IN DOING THINGS: NOT AT ALL
2. FEELING DOWN, DEPRESSED OR HOPELESS: NOT AT ALL

## 2025-07-22 NOTE — PROGRESS NOTES
"Chief Complaint/HPI:    Colonoscopy: 1/16/2025, repeat in one year (1/2026). Sessile serrated adenoma per path report.      HLD: Currently on Rosuvastatin 10 mg daily.      HTN:  patient now takes Micardis only, patient had kidney stones removed, multiple stones were removed by Dr Sanchez, he subsequently developed hypotension.    Patient now sees Dr Fink for nephrology. Renal artery US (3/2023) revealed bilateral renal artery stenosis at 60%, the patient apparently did not have significant renal artery stenosis on cath procedure. He did not require stenting. Patient no longer gets lightheaded at times, especially when standing.      CKD:  seeing nephrology. Has been started on Micardis per nephrology     Elevated T bili:  continue to monitor      Chronic anemia: he is stable now     DM type 2: He takes Jardiance now. This was started for prevention of renal issues.       Cataracts: these are stable, no changes.      Vitamin D deficiency: Takes vitamin D daily Level  WNL      Ruptured appendix: patient developed an acute appendicitis with appendiceal abscess.  He was seen by Dr Davis. Patient also had a colonoscopy completed.  He will follow up with Dr Davis as scheduled    ROS otherwise negative aside from what was mentioned above in HPI.      Problem List[1]      Medical History[2]  Surgical History[3]  Social History     Social History Narrative    Not on file         ALLERGIES  Patient has no known allergies.      MEDICATIONS  Medications Ordered Prior to Encounter[4]      PHYSICAL EXAM  /73 (BP Location: Left arm, Patient Position: Sitting, BP Cuff Size: Adult)   Pulse 78   Temp 36.6 °C (97.9 °F) (Temporal)   Resp 18   Ht 1.727 m (5' 8\")   Wt 87.5 kg (192 lb 14.4 oz)   SpO2 99%   BMI 29.33 kg/m²   Body mass index is 29.33 kg/m².  Constitutional:       Appearance: Normal appearance. He is not ill-appearing.   HENT:      Head: Normocephalic and atraumatic.      Right Ear: External ear normal.     "  Left Ear: External ear normal.   Eyes:      Extraocular Movements: Extraocular movements intact. Wearing glasses     Conjunctiva/sclera: Conjunctivae normal.   Neck:      Vascular: Carotid bruit present.      Comments: Soft bilateral bruits are noted, unchanged  Cardiovascular:      Rate and Rhythm: Normal rate and regular rhythm.      Pulses: Normal pulses.      Heart sounds: Murmur heard. A soft systolic murmur is noted over the RUSB  Pulmonary:      Effort: Pulmonary effort is normal.      Breath sounds: Normal breath sounds.   Musculoskeletal:         General: Swelling (b/l below knee to ankle) present. No tenderness. Normal range of motion.      Cervical back: Normal range of motion and neck supple.      Right lower leg: Edema present, he has trace pitting ankle edema bilaterally. This is not new     Left lower leg: Edema present.   Skin:     General: Skin is warm and dry.     Neurological:      General: No focal deficit present.      Mental Status: He is alert and oriented to person, place, and time. Mental status is at baseline.      Motor: No weakness.   Psychiatric:         Mood and Affect: Mood normal.         Behavior: Behavior normal.         Thought Content: Thought content normal.         Judgment: Judgment normal.        ASSESSMENT/PLAN  Problem List Items Addressed This Visit       Anemia    Relevant Orders    CBC and Auto Differential    CKD (chronic kidney disease)    Current Assessment & Plan   Patient is stable, post removal of kidney stones, continue to monitor, continue follow up with nephrology and with urology         Relevant Medications    telmisartan (MIcarDIS) 20 mg tablet    rosuvastatin (Crestor) 10 mg tablet    Other Relevant Orders    Comprehensive Metabolic Panel    CBC and Auto Differential    Albumin-Creatinine Ratio, Urine Random    Vitamin D 25-Hydroxy,Total (for eval of Vitamin D levels)    Essential hypertension - Primary    Current Assessment & Plan   Takes Micardis only,  this is monitored by nephrology         Relevant Medications    telmisartan (MIcarDIS) 20 mg tablet    Other Relevant Orders    Comprehensive Metabolic Panel    CBC and Auto Differential    Albumin-Creatinine Ratio, Urine Random    Hyperlipidemia LDL goal <100    Current Assessment & Plan   LDL is well controlled, continue rosuvastatin, recheck labs in 6 months          Relevant Medications    rosuvastatin (Crestor) 10 mg tablet    Other Relevant Orders    Comprehensive Metabolic Panel    CBC and Auto Differential    Lipid Panel    Type 2 diabetes mellitus with chronic kidney disease, without long-term current use of insulin (Multi)    Current Assessment & Plan   Patient now takes Jardiance, will continue, monitor glucoses         Relevant Medications    telmisartan (MIcarDIS) 20 mg tablet    rosuvastatin (Crestor) 10 mg tablet    Other Relevant Orders    Hemoglobin A1C    Comprehensive Metabolic Panel    CBC and Auto Differential    Albumin-Creatinine Ratio, Urine Random     Recheck labs prior to next visit in 6 months    Federico Katz MD          [1]   Patient Active Problem List  Diagnosis    Anemia    Chronic arthralgias of knees and hips    Essential hypertension    Hyperbilirubinemia    Low serum vitamin D    Hyperlipidemia LDL goal <100    Albuminuria    Type 2 diabetes mellitus with chronic kidney disease, without long-term current use of insulin (Multi)    Bilateral renal artery stenosis    CKD (chronic kidney disease)    Pseudoaneurysm of iliac artery    Renal artery stenosis    Carotid bruit    Chronic pain    Disorder of digestive tract    Skin ulcer of toe of right foot, limited to breakdown of skin    Lesion of skin of scalp    Acute appendicitis with appendiceal abscess   [2]   Past Medical History:  Diagnosis Date    Anemia 02/03/2023    Chronic arthralgias of knees and hips 02/03/2023    Essential hypertension 02/03/2023    Hyperbilirubinemia 02/03/2023    Hypertension     Low serum vitamin  D 02/03/2023   [3]   Past Surgical History:  Procedure Laterality Date    APPENDECTOMY  09/22/2024    KIDNEY STONE SURGERY  05/27/2025    OTHER SURGICAL HISTORY  11/24/2021    Tonsillectomy with adenoidectomy    OTHER SURGICAL HISTORY  11/24/2021    [place] tooth extraction   [4]   Current Outpatient Medications on File Prior to Visit   Medication Sig Dispense Refill    aflibercept (Eylea) 2 mg/0.05 mL intra-ocular injection 0.05 mL (2 mg) by intravitreal route every 30 (thirty) days.      cholecalciferol (Vitamin D-3) 125 MCG (5000 UT) capsule Take 1 capsule (125 mcg) by mouth once daily.      Jardiance 10 mg Take 1 tablet (10 mg) by mouth early in the morning..      multivit-minerals/FA/lycopene (ONE DAILY FOR MEN ORAL) Take 1 tablet by mouth once daily.      telmisartan (MIcarDIS) 20 mg tablet Take 1 tablet (20 mg) by mouth early in the morning..      [DISCONTINUED] rosuvastatin (Crestor) 10 mg tablet Take 1 tablet (10 mg) by mouth once daily. 90 tablet 3    [DISCONTINUED] amLODIPine (Norvasc) 5 mg tablet TAKE 1 TABLET BY MOUTH EVERY DAY (Patient not taking: Reported on 7/22/2025) 90 tablet 1    [DISCONTINUED] chlorthalidone (Hygroton) 25 mg tablet Take by mouth once daily. (Patient not taking: Reported on 7/22/2025)      [DISCONTINUED] losartan (Cozaar) 50 mg tablet  (Patient not taking: Reported on 7/22/2025)      [DISCONTINUED] metoprolol succinate XL (Toprol-XL) 25 mg 24 hr tablet Take 1 tablet (25 mg) by mouth once daily. (Patient not taking: Reported on 7/22/2025)       No current facility-administered medications on file prior to visit.

## 2025-07-22 NOTE — ASSESSMENT & PLAN NOTE
Patient is stable, post removal of kidney stones, continue to monitor, continue follow up with nephrology and with urology

## 2026-01-22 ENCOUNTER — APPOINTMENT (OUTPATIENT)
Dept: PRIMARY CARE | Facility: CLINIC | Age: 66
End: 2026-01-22
Payer: COMMERCIAL

## (undated) DEVICE — COVER HANDLE LIGHT, STERIS, BLUE, STERILE

## (undated) DEVICE — Device

## (undated) DEVICE — CABLE, ELECTROSURGICAL, MONOPOLAR, LAPAROSCOPIC, 10 FT, LF

## (undated) DEVICE — DRAIN, WOUND, FLAT, HUBLESS, 0.75 PERFORATION, 10 MM X 20 CM, SILICONE

## (undated) DEVICE — CAUTERY, PENCIL, PUSH BUTTON, SMOKE EVAC, 70MM

## (undated) DEVICE — TROCAR, KII OPTICAL BLADELESS 5MM Z THREAD 100MM LNGTH

## (undated) DEVICE — GLOVE, PROTEXIS PI CLASSIC, SZ-7.5, PF, LF

## (undated) DEVICE — RELOAD, LINEAR, 45MM, WHITE

## (undated) DEVICE — APPLIER,  LIGACLIP, ENDO ROTATE, ROTATING, 10MM 20M/L, DISP

## (undated) DEVICE — STAPLER, SKIN, MULTIFIRE, PREMIUM, WIDE, 35 W

## (undated) DEVICE — PREP TRAY, SKIN, DRY, W/GLOVES

## (undated) DEVICE — SCISSOR TIP, ENDOCUT, LAPAROSCOPIC

## (undated) DEVICE — PREP, SCRUB, SKIN, FOAM, HIBICLENS, 4 OZ

## (undated) DEVICE — PAD, GROUNDING, ELECTROSURGICAL, W/9 FT CABLE, POLYHESIVE II, ADULT, LF

## (undated) DEVICE — SOLUTION, IRRIGATION, SODIUM CHLORIDE 0.9%, 1000 ML, POUR BOTTLE

## (undated) DEVICE — DRESSING, GAUZE, SPONGE, 8 PLY, CURITY, 2 X 2 IN, STERILE

## (undated) DEVICE — SUTURE, VICRYL, 0, 27 IN, UR-6, VIOLET

## (undated) DEVICE — SUTURE, VICRYL, 4-0, 18 IN, UNDYED BR PS-2

## (undated) DEVICE — TROCAR, BLUNT TIP, KII, W/SEAL, 12MM X 100MM

## (undated) DEVICE — RELOAD, LINEAR, 45MM, BLUE, REG TISSUE

## (undated) DEVICE — TRAY, SURESTEP, URINE METER, 16FR, COMPLETE, W/STATLOCK

## (undated) DEVICE — ENDO BABCOCKS, 5MM

## (undated) DEVICE — ASSEMBLY, STRYKER FLOW 2, SUCTION IRRIGATOR, WITH TIP

## (undated) DEVICE — EVACUATOR, WOUND, SUCTION, CLOSED, JACKSON-PRATT, 100 CC, SILICONE

## (undated) DEVICE — RETRIEVAL SYSTEM, MONARCH, 10MM DISP ENDOSCOPIC

## (undated) DEVICE — SLEEVE, KII, Z-THREAD, 5X100CM